# Patient Record
Sex: FEMALE | Race: WHITE | Employment: UNEMPLOYED | URBAN - METROPOLITAN AREA
[De-identification: names, ages, dates, MRNs, and addresses within clinical notes are randomized per-mention and may not be internally consistent; named-entity substitution may affect disease eponyms.]

---

## 2017-06-25 ENCOUNTER — HOSPITAL ENCOUNTER (EMERGENCY)
Age: 27
Discharge: HOME | End: 2017-06-25

## 2017-06-25 VITALS
RESPIRATION RATE: 18 BRPM | TEMPERATURE: 98.2 F | DIASTOLIC BLOOD PRESSURE: 88 MMHG | HEIGHT: 66 IN | BODY MASS INDEX: 24.11 KG/M2 | HEART RATE: 88 BPM | OXYGEN SATURATION: 98 % | SYSTOLIC BLOOD PRESSURE: 116 MMHG | WEIGHT: 150 LBS

## 2017-06-25 DIAGNOSIS — N93.8 DUB (DYSFUNCTIONAL UTERINE BLEEDING): Primary | ICD-10-CM

## 2017-06-25 LAB
AMORPHOUS: ABNORMAL
BACTERIA: ABNORMAL /HPF
BILIRUBIN URINE: NEGATIVE
BLOOD, URINE: ABNORMAL
CHP ED QC CHECK: YES
CLARITY: ABNORMAL
COLOR: YELLOW
GLUCOSE URINE: NEGATIVE MG/DL
KETONES, URINE: NEGATIVE MG/DL
LEUKOCYTE ESTERASE, URINE: ABNORMAL
NITRITE, URINE: POSITIVE
PH UA: 5.5 (ref 5–9)
PREGNANCY TEST URINE, POC: NORMAL
PROTEIN UA: 30 MG/DL
RBC UA: ABNORMAL /HPF (ref 0–2)
SPECIFIC GRAVITY UA: 1.01 (ref 1–1.03)
URINE REFLEX TO CULTURE: YES
UROBILINOGEN, URINE: 0.2 E.U./DL
WBC UA: ABNORMAL /HPF (ref 0–5)

## 2017-06-25 PROCEDURE — 81001 URINALYSIS AUTO W/SCOPE: CPT

## 2017-06-25 PROCEDURE — 87077 CULTURE AEROBIC IDENTIFY: CPT

## 2017-06-25 PROCEDURE — 99282 EMERGENCY DEPT VISIT SF MDM: CPT

## 2017-06-25 PROCEDURE — 87086 URINE CULTURE/COLONY COUNT: CPT

## 2017-06-25 PROCEDURE — 87186 SC STD MICRODIL/AGAR DIL: CPT

## 2017-06-25 ASSESSMENT — ENCOUNTER SYMPTOMS
COLOR CHANGE: 0
SHORTNESS OF BREATH: 0
ALLERGIC/IMMUNOLOGIC NEGATIVE: 1
APNEA: 0
ABDOMINAL PAIN: 0
TROUBLE SWALLOWING: 0
EYE PAIN: 0

## 2017-06-27 LAB
ORGANISM: ABNORMAL
URINE CULTURE, ROUTINE: ABNORMAL

## 2018-08-15 ENCOUNTER — OFFICE VISIT (OUTPATIENT)
Dept: OBGYN CLINIC | Age: 28
End: 2018-08-15
Payer: MEDICAID

## 2018-08-15 VITALS
HEIGHT: 66 IN | SYSTOLIC BLOOD PRESSURE: 98 MMHG | DIASTOLIC BLOOD PRESSURE: 54 MMHG | BODY MASS INDEX: 26.68 KG/M2 | WEIGHT: 166 LBS

## 2018-08-15 DIAGNOSIS — O23.43 UTI (URINARY TRACT INFECTION) DURING PREGNANCY, THIRD TRIMESTER: ICD-10-CM

## 2018-08-15 DIAGNOSIS — O09.33: ICD-10-CM

## 2018-08-15 DIAGNOSIS — O09.33: Primary | ICD-10-CM

## 2018-08-15 LAB
BACTERIA: ABNORMAL /HPF
BILIRUBIN URINE: NEGATIVE
BILIRUBIN, POC: ABNORMAL
BLOOD URINE, POC: ABNORMAL
BLOOD, URINE: NEGATIVE
CLARITY, POC: ABNORMAL
CLARITY: ABNORMAL
COLOR, POC: YELLOW
COLOR: YELLOW
EPITHELIAL CELLS, UA: ABNORMAL /HPF
GLUCOSE URINE, POC: ABNORMAL
GLUCOSE URINE: NEGATIVE MG/DL
KETONES, POC: ABNORMAL
KETONES, URINE: NEGATIVE MG/DL
LEUKOCYTE EST, POC: ABNORMAL
LEUKOCYTE ESTERASE, URINE: ABNORMAL
NITRITE, POC: ABNORMAL
NITRITE, URINE: POSITIVE
PH UA: 6.5 (ref 5–9)
PH, POC: 6
PROTEIN UA: NEGATIVE MG/DL
PROTEIN, POC: ABNORMAL
RBC UA: ABNORMAL /HPF (ref 0–2)
RENAL EPITHELIAL, UA: ABNORMAL /HPF
SPECIFIC GRAVITY UA: 1.01 (ref 1–1.03)
SPECIFIC GRAVITY, POC: 1.01
UROBILINOGEN, POC: ABNORMAL
UROBILINOGEN, URINE: 0.2 E.U./DL
WBC UA: ABNORMAL /HPF (ref 0–5)

## 2018-08-15 PROCEDURE — G8419 CALC BMI OUT NRM PARAM NOF/U: HCPCS | Performed by: OBSTETRICS & GYNECOLOGY

## 2018-08-15 PROCEDURE — 4004F PT TOBACCO SCREEN RCVD TLK: CPT | Performed by: OBSTETRICS & GYNECOLOGY

## 2018-08-15 PROCEDURE — 99214 OFFICE O/P EST MOD 30 MIN: CPT | Performed by: OBSTETRICS & GYNECOLOGY

## 2018-08-15 PROCEDURE — G8427 DOCREV CUR MEDS BY ELIG CLIN: HCPCS | Performed by: OBSTETRICS & GYNECOLOGY

## 2018-08-15 NOTE — LETTER
August 15, 2018  Re:  Aimee Day         Information on Dental Hygiene/Problems for Patients and Dentists. Dental Cleaning: It is safe for pregnant patients to have their teeth clean at any time during pregnancy. For patients with certain heart conditions warranting prophylaxis, follow the American Heart Association Guidelines. Antibiotics: Penicillin derivatives are safe to use at any time during pregnancy. Tetracycline, certain Erythromycins and other antibiotics are contraindicated. If the patient is PCN allergic Cleocin may be substituted. Anesthetics: Any of the local lidocaine family anesthetics may be used according to published dosage guidelines at any time during pregnancy. Inhalation anesthetics are to be avoided during the first 4 months of pregnancy. After this, inhalation agents may be used as well as general anesthesia. Braces: It is best to avoid placement of braces until after delivery. Surgery: If possible, delay dental surgery until after delivery. Cavities may be treated and filled. Do not delay necessary dental procedures. Pain medications: All NSAIDS agents are to be avoided during pregnancy. Tylenol and Codeine may be used. X-Rays:  Plain X-Rays are safe up to #50. One single apron over the abdomen is enough to protect the fetus. Do not use more than one apron on the patient abdomen.       Sincerely,         Dr. Paola Figueroa, Gloria Liu

## 2018-08-16 RX ORDER — SULFAMETHOXAZOLE AND TRIMETHOPRIM 800; 160 MG/1; MG/1
1 TABLET ORAL 2 TIMES DAILY
Qty: 14 TABLET | Refills: 0 | Status: SHIPPED | OUTPATIENT
Start: 2018-08-16 | End: 2018-08-23

## 2018-08-16 NOTE — PROGRESS NOTES
Initial OB visit      Favian Murdock is a 32y.o. year old female  @ 28 weeks by dates presents for prenatal care. [facility] was last visit and no care since that time. Patient had some initial labs and [address]. Pt denies any complications with previous pregnancy and no complaints currently. Patient has good fetal movement. No bleeding no contractions     POB: 1 ft     PGYN: negative     PMH: none    PSH: none    MEDS: prenatal vitamins ordered    Drug Allergies: NKDA    SOCHX: negative    FH: Mother or Father , DM No , HTN No, Other No    BP (!) 98/54 (Site: Right Arm, Position: Sitting, Cuff Size: Large Adult)   Ht 5' 6\" (1.676 m)   Wt 166 lb (75.3 kg)   LMP 2018 (Approximate)   BMI 26.79 kg/m²   History reviewed. No pertinent past medical history. Past Surgical History:   Procedure Laterality Date    APPENDECTOMY           Review of Systems  Constitutional: negative for anorexia, chills, fatigue and weight loss  Genitourinary:negative for genital lesions and vaginal discharge, dysuria and frequency, see above  Integument/breast: negative for dryness, pruritus and rash  Behavioral/Psych: negative for abusive relationship, anxiety and depression  Endocrine: negative     All other systems reviewed and are negative. Physical Exam:  Skin: Warm, dry, no lesions or rashes  Extremities: Without clubbing, cyanosis and edema. Palms and nails are normal. Ambulates without difficulty  Neurological: No gross sensory or motor deficits.   Abdomen: Soft, non-tender without masses or organomegaly  bowel sounds normoactive  External Genitalia: Normal anatomy, no lesions or masses  Pubic Hair Distribution: Normal pattern and distribution  Pelvic Floor: Normal pelvic support, no significant cystocele or rectocele  Perineum: No fissures, lesions or leukoplakia  Urethra: Normal  Vagina: Moist, pink, supple, no lesions, no abnormal discharge  Cervix: Firm, nontender, no lesions  Uterus: firm, mobile, nontender, no masses or irregularities  Adnexa: Normal; No masses or tenderness bilaterally      Assessment:   1. Suprvsn of preg w insufficient antenat care, third trimester    2. UTI (urinary tract infection) during pregnancy, third trimester        Plan:   Orders Placed This Encounter   Procedures    C.trachomatis N.gonorrhoeae DNA, Urine     Standing Status:   Future     Standing Expiration Date:   8/15/2019    Glucose tolerance, 1 hour     Standing Status:   Future     Standing Expiration Date:   8/15/2019    Urinalysis with Microscopic     Standing Status:   Future     Standing Expiration Date:   8/15/2019     Order Specific Question:   SPECIFY(EX-CATH,MIDSTREAM,CYSTO,ETC)? Answer:   mmidstream    CBC Auto Differential     Standing Status:   Future     Standing Expiration Date:   8/15/2019    Herpes simplex virus (HSV) I/II antibodies IgG & IgM w/ reflex     Standing Status:   Future     Standing Expiration Date:   8/15/2019    Urine Drug Screen 9 Panel     Standing Status:   Future     Number of Occurrences:   1     Standing Expiration Date:   8/15/2019    POCT Urinalysis No Micro (Auto)        Orders Placed This Encounter   Medications    sulfamethoxazole-trimethoprim (BACTRIM DS) 800-160 MG per tablet     Sig: Take 1 tablet by mouth 2 times daily for 7 days     Dispense:  14 tablet     Refill:  0     Plan:   DO Donnie Mann  8/15/2018  Patient's last menstrual period was 2018 (approximate). INITIAL OBSTETRICAL VISIT EVALUATION:  The patient was seen full history and physical was completed/reviewed. Cytology was collected for patients over 24years of age. Cultures were collected. The patient was counseled on office policies and she was counseled on termination of pregnancy in the state of [place].      The patient was counseled on Toxoplasmosis, HIV, Tobacco Abuse, Group Beta Strep Infections, Cystic Fibrosis,  Labor

## 2018-08-17 ENCOUNTER — HOSPITAL ENCOUNTER (EMERGENCY)
Age: 28
Discharge: HOME | End: 2018-08-17
Payer: MEDICAID

## 2018-08-17 ENCOUNTER — TELEPHONE (OUTPATIENT)
Dept: ENDOCRINOLOGY | Age: 28
End: 2018-08-17

## 2018-08-17 VITALS
BODY MASS INDEX: 26.68 KG/M2 | RESPIRATION RATE: 12 BRPM | HEART RATE: 99 BPM | SYSTOLIC BLOOD PRESSURE: 117 MMHG | OXYGEN SATURATION: 99 % | WEIGHT: 166 LBS | TEMPERATURE: 97.9 F | HEIGHT: 66 IN | DIASTOLIC BLOOD PRESSURE: 76 MMHG

## 2018-08-17 DIAGNOSIS — J01.40 ACUTE NON-RECURRENT PANSINUSITIS: Primary | ICD-10-CM

## 2018-08-17 PROCEDURE — 99282 EMERGENCY DEPT VISIT SF MDM: CPT

## 2018-08-17 RX ORDER — CETIRIZINE HYDROCHLORIDE 10 MG/1
10 TABLET ORAL DAILY
Qty: 30 TABLET | Refills: 0 | Status: ON HOLD | OUTPATIENT
Start: 2018-08-17 | End: 2020-12-24

## 2018-08-17 RX ORDER — FLUTICASONE PROPIONATE 50 MCG
2 SPRAY, SUSPENSION (ML) NASAL DAILY
Qty: 1 BOTTLE | Refills: 0 | Status: ON HOLD | OUTPATIENT
Start: 2018-08-17 | End: 2020-12-24

## 2018-08-17 RX ORDER — ASCORBIC ACID, CHOLECALCIFEROL, .ALPHA.-TOCOPHEROL ACETATE, DL-, PYRIDOXINE HYDROCHLORIDE, FOLIC ACID, CYANOCOBALAMIN, BIOTIN, CALCIUM CARBONATE, FERROUS ASPARTO GLYCINATE, IRON, POTASSIUM IODIDE, MAGNESIUM OXIDE, DOCONEXENT AND LOWBUSH BLUEBERRY 60; 1000; 10; 26; 400; 13; 280; 80; 9; 9; 150; 25; 350; 25; 600 MG/1; [IU]/1; [IU]/1; MG/1; UG/1; UG/1; UG/1; MG/1; MG/1; MG/1; UG/1; MG/1; MG/1; MG/1; UG/1
1 CAPSULE, GELATIN COATED ORAL DAILY
Qty: 30 CAPSULE | Refills: 5 | Status: ON HOLD | OUTPATIENT
Start: 2018-08-17 | End: 2020-12-23

## 2018-08-17 ASSESSMENT — ENCOUNTER SYMPTOMS
COUGH: 0
ABDOMINAL PAIN: 0
WHEEZING: 0
TROUBLE SWALLOWING: 0
DIARRHEA: 0
EYE REDNESS: 0
VOMITING: 0
CHEST TIGHTNESS: 0
NAUSEA: 0
EYE ITCHING: 0
SINUS PRESSURE: 1
SHORTNESS OF BREATH: 0
CONSTIPATION: 0
SINUS PAIN: 1
EYE PAIN: 0
EYE DISCHARGE: 0
COLOR CHANGE: 0
SORE THROAT: 0
ABDOMINAL DISTENTION: 0
RHINORRHEA: 0

## 2018-08-17 ASSESSMENT — PAIN SCALES - GENERAL: PAINLEVEL_OUTOF10: 7

## 2018-08-17 ASSESSMENT — PAIN DESCRIPTION - PAIN TYPE: TYPE: ACUTE PAIN

## 2018-08-17 NOTE — ED PROVIDER NOTES
[facility] ED  eMERGENCY dEPARTMENT eNCOUnter      Pt Name: Addi Mortensen  MRN: [id number]  [place] 1990  Date of evaluation: 8/17/2018  Provider: Tiffany Christensen       Chief Complaint   Patient presents with    Facial Pain     c/o pain in head, nose, jaw with blisters in the top of her mouth. started Sunday         HISTORY OF PRESENT ILLNESS   (Location/Symptom, Timing/Onset, Context/Setting, Quality, Duration, Modifying Factors, Severity)  Note limiting factors. Addi Mortensen is a 32 y.o. female who presents to the emergency department For complaint of sinus pressure pain across the nose and under the eyes and of the jaw on Sunday. Patient states pain is 7 out of 10 aching throbbing sensation made worse with certain positions the head or bending forward. She states that over-the-counter Tylenol has not been providing relief. Patient's confirms that she is currently pregnant and was at her OB appointment for urinary tract infection and given antibiotics which she has not yet started. She denies any cough congestion shortness of breath. Patient denies any ear pain sore throat or nasal discharge. Nursing Notes were reviewed. REVIEW OF SYSTEMS    (2-9 systems for level 4, 10 or more for level 5)     Review of Systems   Constitutional: Negative for activity change, appetite change, chills, diaphoresis, fatigue and fever. HENT: Positive for sinus pain and sinus pressure. Negative for congestion, ear pain, postnasal drip, rhinorrhea, sneezing, sore throat and trouble swallowing. Eyes: Negative for pain, discharge, redness, itching and visual disturbance. Respiratory: Negative for cough, chest tightness, shortness of breath and wheezing. Cardiovascular: Negative for chest pain and palpitations. Gastrointestinal: Negative for abdominal distention, abdominal pain, constipation, diarrhea, nausea and vomiting.    Genitourinary: Negative for difficulty urinating and flank pain. Musculoskeletal: Negative for myalgias. Skin: Negative for color change and rash. Neurological: Positive for headaches. Negative for dizziness, weakness and light-headedness. Except as noted above the remainder of the review of systems was reviewed and negative. PAST MEDICAL HISTORY   History reviewed. No pertinent past medical history. Past Surgical History:   Procedure Laterality Date    APPENDECTOMY       Social History     Social History    Marital status: Single     Spouse name: N/A    Number of children: N/A    Years of education: N/A     Social History Main Topics    Smoking status: Current Every Day Smoker     Packs/day: 1.00     Years: 15.00     Types: Cigarettes    Smokeless tobacco: Never Used    Alcohol use No      Comment: daily    Drug use: No      Comment: weed daily    Sexual activity: Yes     Partners: Male     Other Topics Concern    None     Social History Narrative    None       SCREENINGS             PHYSICAL EXAM    (up to 7 for level 4, 8 or more for level 5)     ED Triage Vitals [08/17/18 1022]   BP Temp Temp Source Pulse Resp SpO2 Height Weight   117/76 97.9 °F (36.6 °C) Oral 99 12 99 % 5' 6\" (1.676 m) 166 lb (75.3 kg)       Physical Exam   Constitutional: She is oriented to person, place, and time. She appears well-developed and well-nourished. No distress. HENT:   Head: Normocephalic and atraumatic. Right Ear: Hearing, external ear and ear canal normal. Tympanic membrane is bulging. Left Ear: Hearing, external ear and ear canal normal. Tympanic membrane is bulging. Nose: Mucosal edema and sinus tenderness present. No rhinorrhea. Right sinus exhibits maxillary sinus tenderness and frontal sinus tenderness. Left sinus exhibits maxillary sinus tenderness and frontal sinus tenderness. Mouth/Throat: Uvula is midline and mucous membranes are normal. Posterior oropharyngeal erythema present.  No oropharyngeal exudate,

## 2018-08-18 LAB
AMPHETAMINE SCREEN, URINE: NEGATIVE NG/ML
BARBITURATE SCREEN URINE: NEGATIVE NG/ML
BENZODIAZEPINE SCREEN, URINE: NEGATIVE NG/ML
CANNABINOID SCREEN URINE: POSITIVE NG/ML
COCAINE METABOLITE SCREEN URINE: POSITIVE NG/ML
CREATININE URINE: 58.2 MG/DL (ref 20–400)
Lab: NORMAL
MDMA URINE: NEGATIVE NG/ML
METHADONE SCREEN, URINE: NEGATIVE NG/ML
OPIATE SCREEN URINE: NEGATIVE NG/ML
OXYCODONE SCREEN URINE: NEGATIVE NG/ML
PHENCYCLIDINE SCREEN URINE: NEGATIVE NG/ML
PROPOXYPHENE SCREEN: NEGATIVE NG/ML

## 2018-08-20 LAB
C. TRACHOMATIS DNA ,URINE: NEGATIVE
N. GONORRHOEAE DNA, URINE: NEGATIVE

## 2018-08-27 DIAGNOSIS — O09.33: ICD-10-CM

## 2018-08-27 LAB
BACTERIA: ABNORMAL /HPF
BASOPHILS ABSOLUTE: 0 K/UL (ref 0–0.2)
BASOPHILS RELATIVE PERCENT: 0.3 %
BILIRUBIN URINE: ABNORMAL
BLOOD, URINE: NEGATIVE
CLARITY: ABNORMAL
COLOR: ABNORMAL
EOSINOPHILS ABSOLUTE: 0.1 K/UL (ref 0–0.7)
EOSINOPHILS RELATIVE PERCENT: 0.9 %
EPITHELIAL CELLS, UA: ABNORMAL /HPF
GLUCOSE URINE: NEGATIVE MG/DL
GLUCOSE, 1HR PP: 112 MG/DL (ref 60–140)
HCT VFR BLD CALC: 35.6 % (ref 37–47)
HEMOGLOBIN: 12.1 G/DL (ref 12–16)
KETONES, URINE: NEGATIVE MG/DL
LEUKOCYTE ESTERASE, URINE: ABNORMAL
LYMPHOCYTES ABSOLUTE: 1.4 K/UL (ref 1–4.8)
LYMPHOCYTES RELATIVE PERCENT: 11.9 %
MCH RBC QN AUTO: 33.1 PG (ref 27–31.3)
MCHC RBC AUTO-ENTMCNC: 34.1 % (ref 33–37)
MCV RBC AUTO: 97.1 FL (ref 82–100)
MONOCYTES ABSOLUTE: 0.4 K/UL (ref 0.2–0.8)
MONOCYTES RELATIVE PERCENT: 3.9 %
NEUTROPHILS ABSOLUTE: 9.4 K/UL (ref 1.4–6.5)
NEUTROPHILS RELATIVE PERCENT: 83 %
NITRITE, URINE: NEGATIVE
PDW BLD-RTO: 12.9 % (ref 11.5–14.5)
PH UA: 6 (ref 5–9)
PLATELET # BLD: 252 K/UL (ref 130–400)
PROTEIN UA: ABNORMAL MG/DL
RBC # BLD: 3.67 M/UL (ref 4.2–5.4)
RBC UA: ABNORMAL /HPF (ref 0–2)
RENAL EPITHELIAL, UA: ABNORMAL /HPF
SPECIFIC GRAVITY UA: 1.03 (ref 1–1.03)
UROBILINOGEN, URINE: 0.2 E.U./DL
WBC # BLD: 11.4 K/UL (ref 4.8–10.8)
WBC UA: ABNORMAL /HPF (ref 0–5)

## 2018-08-30 LAB
HERPES TYPE 1/2 IGM COMBINED: 0.65 IV
HERPES TYPE I/II IGG COMBINED: 13.3 IV
HSV 1 GLYCOPROTEIN G AB IGG: 23.9 IV
HSV 2 GLYCOPROTEIN G AB IGG: 0.05 IV

## 2018-09-05 LAB
C. TRACHOMATIS DNA ,URINE: NEGATIVE
N. GONORRHOEAE DNA, URINE: NEGATIVE

## 2018-09-07 ENCOUNTER — ROUTINE PRENATAL (OUTPATIENT)
Dept: OBGYN CLINIC | Age: 28
End: 2018-09-07
Payer: MEDICAID

## 2018-09-07 VITALS — DIASTOLIC BLOOD PRESSURE: 62 MMHG | SYSTOLIC BLOOD PRESSURE: 110 MMHG | HEART RATE: 86 BPM

## 2018-09-07 DIAGNOSIS — Z3A.32 32 WEEKS GESTATION OF PREGNANCY: ICD-10-CM

## 2018-09-07 DIAGNOSIS — Z34.83 ENCOUNTER FOR SUPERVISION OF OTHER NORMAL PREGNANCY IN THIRD TRIMESTER: Primary | ICD-10-CM

## 2018-09-07 DIAGNOSIS — F17.210 CIGARETTE NICOTINE DEPENDENCE WITHOUT COMPLICATION: ICD-10-CM

## 2018-09-07 LAB
BILIRUBIN, POC: ABNORMAL
BLOOD URINE, POC: ABNORMAL
CLARITY, POC: CLEAR
COLOR, POC: YELLOW
GLUCOSE URINE, POC: ABNORMAL
KETONES, POC: ABNORMAL
LEUKOCYTE EST, POC: ABNORMAL
NITRITE, POC: ABNORMAL
PH, POC: 8
PROTEIN, POC: ABNORMAL
SPECIFIC GRAVITY, POC: 1.01
UROBILINOGEN, POC: ABNORMAL

## 2018-09-07 PROCEDURE — G8419 CALC BMI OUT NRM PARAM NOF/U: HCPCS | Performed by: OBSTETRICS & GYNECOLOGY

## 2018-09-07 PROCEDURE — 99213 OFFICE O/P EST LOW 20 MIN: CPT | Performed by: OBSTETRICS & GYNECOLOGY

## 2018-09-07 PROCEDURE — G8427 DOCREV CUR MEDS BY ELIG CLIN: HCPCS | Performed by: OBSTETRICS & GYNECOLOGY

## 2018-09-07 PROCEDURE — 4004F PT TOBACCO SCREEN RCVD TLK: CPT | Performed by: OBSTETRICS & GYNECOLOGY

## 2018-09-17 ENCOUNTER — HOSPITAL ENCOUNTER (OUTPATIENT)
Dept: ULTRASOUND IMAGING | Age: 28
Discharge: HOME | End: 2018-09-19
Payer: MEDICAID

## 2018-09-17 DIAGNOSIS — Z34.83 ENCOUNTER FOR SUPERVISION OF OTHER NORMAL PREGNANCY IN THIRD TRIMESTER: ICD-10-CM

## 2018-09-17 DIAGNOSIS — Z3A.32 32 WEEKS GESTATION OF PREGNANCY: ICD-10-CM

## 2018-09-17 DIAGNOSIS — F17.210 CIGARETTE NICOTINE DEPENDENCE WITHOUT COMPLICATION: ICD-10-CM

## 2018-09-17 PROCEDURE — 76805 OB US >/= 14 WKS SNGL FETUS: CPT

## 2018-09-17 PROCEDURE — 76817 TRANSVAGINAL US OBSTETRIC: CPT

## 2018-09-21 ENCOUNTER — ROUTINE PRENATAL (OUTPATIENT)
Dept: OBGYN CLINIC | Age: 28
End: 2018-09-21
Payer: MEDICAID

## 2018-09-21 VITALS
BODY MASS INDEX: 26.95 KG/M2 | SYSTOLIC BLOOD PRESSURE: 104 MMHG | HEART RATE: 92 BPM | DIASTOLIC BLOOD PRESSURE: 70 MMHG | WEIGHT: 167 LBS

## 2018-09-21 DIAGNOSIS — Z3A.38 38 WEEKS GESTATION OF PREGNANCY: ICD-10-CM

## 2018-09-21 DIAGNOSIS — Z34.83 ENCOUNTER FOR SUPERVISION OF OTHER NORMAL PREGNANCY IN THIRD TRIMESTER: Primary | ICD-10-CM

## 2018-09-21 PROCEDURE — G8419 CALC BMI OUT NRM PARAM NOF/U: HCPCS | Performed by: OBSTETRICS & GYNECOLOGY

## 2018-09-21 PROCEDURE — 99213 OFFICE O/P EST LOW 20 MIN: CPT | Performed by: OBSTETRICS & GYNECOLOGY

## 2018-09-21 PROCEDURE — 4004F PT TOBACCO SCREEN RCVD TLK: CPT | Performed by: OBSTETRICS & GYNECOLOGY

## 2018-09-21 PROCEDURE — G8427 DOCREV CUR MEDS BY ELIG CLIN: HCPCS | Performed by: OBSTETRICS & GYNECOLOGY

## 2018-09-21 RX ORDER — PNV,CALCIUM 72/IRON/FOLIC ACID 27 MG-1 MG
TABLET ORAL
Refills: 5 | Status: ON HOLD | COMMUNITY
Start: 2018-09-15 | End: 2020-12-23

## 2018-10-09 ENCOUNTER — ROUTINE PRENATAL (OUTPATIENT)
Dept: OBGYN CLINIC | Age: 28
End: 2018-10-09
Payer: MEDICAID

## 2018-10-09 VITALS
BODY MASS INDEX: 27.44 KG/M2 | SYSTOLIC BLOOD PRESSURE: 100 MMHG | HEART RATE: 96 BPM | DIASTOLIC BLOOD PRESSURE: 64 MMHG | WEIGHT: 170 LBS

## 2018-10-09 DIAGNOSIS — Z34.83 ENCOUNTER FOR SUPERVISION OF OTHER NORMAL PREGNANCY IN THIRD TRIMESTER: ICD-10-CM

## 2018-10-09 DIAGNOSIS — Z3A.36 36 WEEKS GESTATION OF PREGNANCY: Primary | ICD-10-CM

## 2018-10-09 DIAGNOSIS — Z3A.36 36 WEEKS GESTATION OF PREGNANCY: ICD-10-CM

## 2018-10-09 PROCEDURE — 4004F PT TOBACCO SCREEN RCVD TLK: CPT | Performed by: OBSTETRICS & GYNECOLOGY

## 2018-10-09 PROCEDURE — G8419 CALC BMI OUT NRM PARAM NOF/U: HCPCS | Performed by: OBSTETRICS & GYNECOLOGY

## 2018-10-09 PROCEDURE — G8484 FLU IMMUNIZE NO ADMIN: HCPCS | Performed by: OBSTETRICS & GYNECOLOGY

## 2018-10-09 PROCEDURE — 99213 OFFICE O/P EST LOW 20 MIN: CPT | Performed by: OBSTETRICS & GYNECOLOGY

## 2018-10-09 PROCEDURE — G8427 DOCREV CUR MEDS BY ELIG CLIN: HCPCS | Performed by: OBSTETRICS & GYNECOLOGY

## 2018-10-09 NOTE — PROGRESS NOTES
Patient's last menstrual period was 01/26/2018 (approximate). Please reference prenatal and OB flow chart for further information  PT here today for routine prenatal care  Pt endorses fetal movement and denies loss of fluid, contractions or vaginal bleeding  Pt without complaints  ROS:  Pt denies headache, dysuria, nausea/vomiting  PE:  /64   Pulse 96   Wt 170 lb (77.1 kg)   LMP 01/26/2018 (Approximate)   BMI 27.44 kg/m²   Gen - Alert and oriented x 3  HEENT- NC/AT, CVS - RRR, Lungs - CTAB  Abd - FH Appropriate fetal growth  LE no edema  Reassuring fetal status at this time    Upon completion of the visit all questions were answered and the patients follow-up and testing schedule were reviewed.

## 2018-10-11 LAB
GROUP B STREP CULTURE: ABNORMAL
GROUP B STREP CULTURE: ABNORMAL
ORGANISM: ABNORMAL

## 2018-10-17 ENCOUNTER — ROUTINE PRENATAL (OUTPATIENT)
Dept: OBGYN CLINIC | Age: 28
End: 2018-10-17
Payer: MEDICAID

## 2018-10-17 VITALS
BODY MASS INDEX: 27.92 KG/M2 | SYSTOLIC BLOOD PRESSURE: 100 MMHG | WEIGHT: 173 LBS | DIASTOLIC BLOOD PRESSURE: 62 MMHG | HEART RATE: 88 BPM

## 2018-10-17 DIAGNOSIS — Z3A.37 37 WEEKS GESTATION OF PREGNANCY: Primary | ICD-10-CM

## 2018-10-17 DIAGNOSIS — O99.333 HIGH RISK PREGNANCY DUE TO SMOKING IN THIRD TRIMESTER: ICD-10-CM

## 2018-10-17 PROCEDURE — 4004F PT TOBACCO SCREEN RCVD TLK: CPT | Performed by: OBSTETRICS & GYNECOLOGY

## 2018-10-17 PROCEDURE — 99213 OFFICE O/P EST LOW 20 MIN: CPT | Performed by: OBSTETRICS & GYNECOLOGY

## 2018-10-17 PROCEDURE — G8484 FLU IMMUNIZE NO ADMIN: HCPCS | Performed by: OBSTETRICS & GYNECOLOGY

## 2018-10-17 PROCEDURE — G8419 CALC BMI OUT NRM PARAM NOF/U: HCPCS | Performed by: OBSTETRICS & GYNECOLOGY

## 2018-10-17 PROCEDURE — G8427 DOCREV CUR MEDS BY ELIG CLIN: HCPCS | Performed by: OBSTETRICS & GYNECOLOGY

## 2018-10-17 NOTE — PROGRESS NOTES
Patient's last menstrual period was 01/26/2018 (approximate). Please reference prenatal and OB flow chart for further information  PT here today for routine prenatal care  Pt endorses fetal movement and denies loss of fluid, contractions or vaginal bleeding  Pt without complaints  ROS:  Pt denies headache, dysuria, nausea/vomiting  PE:  /62   Pulse 88   Wt 173 lb (78.5 kg)   LMP 01/26/2018 (Approximate)   BMI 27.92 kg/m²   Gen - Alert and oriented x 3  HEENT- NC/AT, CVS - RRR, Lungs - CTAB  Abd - FH Appropriate fetal growth  LE no edema  Reassuring fetal status at this time    Upon completion of the visit all questions were answered and the patients follow-up and testing schedule were reviewed.

## 2018-10-24 ENCOUNTER — ROUTINE PRENATAL (OUTPATIENT)
Dept: OBGYN CLINIC | Age: 28
End: 2018-10-24
Payer: MEDICAID

## 2018-10-24 VITALS
DIASTOLIC BLOOD PRESSURE: 64 MMHG | SYSTOLIC BLOOD PRESSURE: 104 MMHG | WEIGHT: 171 LBS | HEART RATE: 90 BPM | BODY MASS INDEX: 27.6 KG/M2

## 2018-10-24 DIAGNOSIS — Z3A.38 38 WEEKS GESTATION OF PREGNANCY: ICD-10-CM

## 2018-10-24 DIAGNOSIS — Z34.83 ENCOUNTER FOR SUPERVISION OF OTHER NORMAL PREGNANCY IN THIRD TRIMESTER: Primary | ICD-10-CM

## 2018-10-24 DIAGNOSIS — F17.210 CIGARETTE NICOTINE DEPENDENCE WITHOUT COMPLICATION: ICD-10-CM

## 2018-10-24 PROCEDURE — 4004F PT TOBACCO SCREEN RCVD TLK: CPT | Performed by: OBSTETRICS & GYNECOLOGY

## 2018-10-24 PROCEDURE — G8427 DOCREV CUR MEDS BY ELIG CLIN: HCPCS | Performed by: OBSTETRICS & GYNECOLOGY

## 2018-10-24 PROCEDURE — G8419 CALC BMI OUT NRM PARAM NOF/U: HCPCS | Performed by: OBSTETRICS & GYNECOLOGY

## 2018-10-24 PROCEDURE — G8484 FLU IMMUNIZE NO ADMIN: HCPCS | Performed by: OBSTETRICS & GYNECOLOGY

## 2018-10-24 PROCEDURE — 99213 OFFICE O/P EST LOW 20 MIN: CPT | Performed by: OBSTETRICS & GYNECOLOGY

## 2018-10-31 ENCOUNTER — ROUTINE PRENATAL (OUTPATIENT)
Dept: OBGYN CLINIC | Age: 28
End: 2018-10-31
Payer: MEDICAID

## 2018-10-31 VITALS
WEIGHT: 171 LBS | DIASTOLIC BLOOD PRESSURE: 62 MMHG | HEART RATE: 98 BPM | BODY MASS INDEX: 27.6 KG/M2 | SYSTOLIC BLOOD PRESSURE: 108 MMHG

## 2018-10-31 DIAGNOSIS — Z3A.39 39 WEEKS GESTATION OF PREGNANCY: ICD-10-CM

## 2018-10-31 DIAGNOSIS — Z34.83 ENCOUNTER FOR SUPERVISION OF OTHER NORMAL PREGNANCY IN THIRD TRIMESTER: Primary | ICD-10-CM

## 2018-10-31 PROCEDURE — 99213 OFFICE O/P EST LOW 20 MIN: CPT | Performed by: OBSTETRICS & GYNECOLOGY

## 2018-10-31 PROCEDURE — G8427 DOCREV CUR MEDS BY ELIG CLIN: HCPCS | Performed by: OBSTETRICS & GYNECOLOGY

## 2018-10-31 PROCEDURE — G8419 CALC BMI OUT NRM PARAM NOF/U: HCPCS | Performed by: OBSTETRICS & GYNECOLOGY

## 2018-10-31 PROCEDURE — G8484 FLU IMMUNIZE NO ADMIN: HCPCS | Performed by: OBSTETRICS & GYNECOLOGY

## 2018-10-31 PROCEDURE — 4004F PT TOBACCO SCREEN RCVD TLK: CPT | Performed by: OBSTETRICS & GYNECOLOGY

## 2018-11-01 ENCOUNTER — HOSPITAL ENCOUNTER (INPATIENT)
Age: 28
LOS: 1 days | Discharge: HOME | DRG: 560 | End: 2018-11-02
Attending: OBSTETRICS & GYNECOLOGY | Admitting: OBSTETRICS & GYNECOLOGY
Payer: MEDICAID

## 2018-11-01 ENCOUNTER — ANESTHESIA EVENT (OUTPATIENT)
Dept: LABOR AND DELIVERY | Age: 28
DRG: 560 | End: 2018-11-01
Payer: MEDICAID

## 2018-11-01 ENCOUNTER — ANESTHESIA (OUTPATIENT)
Dept: LABOR AND DELIVERY | Age: 28
DRG: 560 | End: 2018-11-01
Payer: MEDICAID

## 2018-11-01 PROBLEM — Z78.9 ADMITTED TO LABOR AND DELIVERY: Status: ACTIVE | Noted: 2018-11-01

## 2018-11-01 LAB
ABO/RH: NORMAL
ALBUMIN SERPL-MCNC: 3.7 G/DL (ref 3.9–4.9)
ALP BLD-CCNC: 215 U/L (ref 40–130)
ALT SERPL-CCNC: 23 U/L (ref 0–33)
AMORPHOUS: ABNORMAL
AMPHETAMINE SCREEN, URINE: ABNORMAL
ANION GAP SERPL CALCULATED.3IONS-SCNC: 18 MEQ/L (ref 7–13)
ANTIBODY SCREEN: NORMAL
AST SERPL-CCNC: 23 U/L (ref 0–35)
BACTERIA: ABNORMAL /HPF
BARBITURATE SCREEN URINE: ABNORMAL
BASOPHILS ABSOLUTE: 0.1 K/UL (ref 0–0.2)
BASOPHILS RELATIVE PERCENT: 0.3 %
BENZODIAZEPINE SCREEN, URINE: ABNORMAL
BILIRUB SERPL-MCNC: 0.4 MG/DL (ref 0–1.2)
BILIRUBIN URINE: NEGATIVE
BLOOD, URINE: ABNORMAL
BUN BLDV-MCNC: 7 MG/DL (ref 6–20)
CALCIUM SERPL-MCNC: 8.8 MG/DL (ref 8.6–10.2)
CANNABINOID SCREEN URINE: ABNORMAL
CHLORIDE BLD-SCNC: 91 MEQ/L (ref 98–107)
CLARITY: CLEAR
CO2: 18 MEQ/L (ref 22–29)
COCAINE METABOLITE SCREEN URINE: POSITIVE
COLOR: YELLOW
CREAT SERPL-MCNC: 0.51 MG/DL (ref 0.5–0.9)
EOSINOPHILS ABSOLUTE: 0 K/UL (ref 0–0.7)
EOSINOPHILS RELATIVE PERCENT: 0.1 %
GFR AFRICAN AMERICAN: >60
GFR NON-AFRICAN AMERICAN: >60
GLOBULIN: 3.1 G/DL (ref 2.3–3.5)
GLUCOSE BLD-MCNC: 89 MG/DL (ref 74–109)
GLUCOSE URINE: NEGATIVE MG/DL
HCT VFR BLD CALC: 39.5 % (ref 37–47)
HEMOGLOBIN: 13.4 G/DL (ref 12–16)
HEPATITIS B SURFACE ANTIGEN INTERPRETATION: NORMAL
KETONES, URINE: 15 MG/DL
LEUKOCYTE ESTERASE, URINE: ABNORMAL
LYMPHOCYTES ABSOLUTE: 1.1 K/UL (ref 1–4.8)
LYMPHOCYTES RELATIVE PERCENT: 5.2 %
Lab: ABNORMAL
MCH RBC QN AUTO: 32.4 PG (ref 27–31.3)
MCHC RBC AUTO-ENTMCNC: 34.1 % (ref 33–37)
MCV RBC AUTO: 95 FL (ref 82–100)
MONOCYTES ABSOLUTE: 0.8 K/UL (ref 0.2–0.8)
MONOCYTES RELATIVE PERCENT: 3.8 %
NEUTROPHILS ABSOLUTE: 18.9 K/UL (ref 1.4–6.5)
NEUTROPHILS RELATIVE PERCENT: 90.6 %
NITRITE, URINE: NEGATIVE
OPIATE SCREEN URINE: ABNORMAL
PDW BLD-RTO: 13.3 % (ref 11.5–14.5)
PH UA: 7.5 (ref 5–9)
PHENCYCLIDINE SCREEN URINE: ABNORMAL
PLATELET # BLD: 236 K/UL (ref 130–400)
POTASSIUM SERPL-SCNC: 3.5 MEQ/L (ref 3.5–5.1)
PROTEIN UA: NEGATIVE MG/DL
RBC # BLD: 4.15 M/UL (ref 4.2–5.4)
RBC UA: ABNORMAL /HPF (ref 0–2)
SLIDE REVIEW: ABNORMAL
SODIUM BLD-SCNC: 127 MEQ/L (ref 132–144)
SPECIFIC GRAVITY UA: 1.01 (ref 1–1.03)
TOTAL PROTEIN: 6.8 G/DL (ref 6.4–8.1)
UROBILINOGEN, URINE: 0.2 E.U./DL
WBC # BLD: 20.9 K/UL (ref 4.8–10.8)
WBC UA: ABNORMAL /HPF (ref 0–5)

## 2018-11-01 PROCEDURE — 2580000003 HC RX 258: Performed by: OBSTETRICS & GYNECOLOGY

## 2018-11-01 PROCEDURE — 85025 COMPLETE CBC W/AUTO DIFF WBC: CPT

## 2018-11-01 PROCEDURE — 2500000003 HC RX 250 WO HCPCS: Performed by: NURSE ANESTHETIST, CERTIFIED REGISTERED

## 2018-11-01 PROCEDURE — 81001 URINALYSIS AUTO W/SCOPE: CPT

## 2018-11-01 PROCEDURE — 6360000002 HC RX W HCPCS: Performed by: OBSTETRICS & GYNECOLOGY

## 2018-11-01 PROCEDURE — 86592 SYPHILIS TEST NON-TREP QUAL: CPT

## 2018-11-01 PROCEDURE — 36415 COLL VENOUS BLD VENIPUNCTURE: CPT

## 2018-11-01 PROCEDURE — 87340 HEPATITIS B SURFACE AG IA: CPT

## 2018-11-01 PROCEDURE — 59409 OBSTETRICAL CARE: CPT | Performed by: OBSTETRICS & GYNECOLOGY

## 2018-11-01 PROCEDURE — 80053 COMPREHEN METABOLIC PANEL: CPT

## 2018-11-01 PROCEDURE — 6370000000 HC RX 637 (ALT 250 FOR IP): Performed by: OBSTETRICS & GYNECOLOGY

## 2018-11-01 PROCEDURE — 88307 TISSUE EXAM BY PATHOLOGIST: CPT

## 2018-11-01 PROCEDURE — 1220000000 HC SEMI PRIVATE OB R&B

## 2018-11-01 PROCEDURE — 80307 DRUG TEST PRSMV CHEM ANLYZR: CPT

## 2018-11-01 PROCEDURE — 86850 RBC ANTIBODY SCREEN: CPT

## 2018-11-01 PROCEDURE — 86900 BLOOD TYPING SEROLOGIC ABO: CPT

## 2018-11-01 PROCEDURE — 3700000025 ANESTHESIA EPIDURAL BLOCK: Performed by: NURSE ANESTHETIST, CERTIFIED REGISTERED

## 2018-11-01 PROCEDURE — 86901 BLOOD TYPING SEROLOGIC RH(D): CPT

## 2018-11-01 RX ORDER — SODIUM CHLORIDE 0.9 % (FLUSH) 0.9 %
10 SYRINGE (ML) INJECTION EVERY 12 HOURS SCHEDULED
Status: DISCONTINUED | OUTPATIENT
Start: 2018-11-01 | End: 2018-11-02 | Stop reason: HOSPADM

## 2018-11-01 RX ORDER — ACETAMINOPHEN 325 MG/1
650 TABLET ORAL EVERY 4 HOURS PRN
Status: DISCONTINUED | OUTPATIENT
Start: 2018-11-01 | End: 2018-11-01

## 2018-11-01 RX ORDER — CETIRIZINE HYDROCHLORIDE 10 MG/1
10 TABLET ORAL DAILY
Status: DISCONTINUED | OUTPATIENT
Start: 2018-11-01 | End: 2018-11-02 | Stop reason: HOSPADM

## 2018-11-01 RX ORDER — NALBUPHINE HCL 10 MG/ML
10 AMPUL (ML) INJECTION
Status: DISCONTINUED | OUTPATIENT
Start: 2018-11-01 | End: 2018-11-01

## 2018-11-01 RX ORDER — FAMOTIDINE 20 MG/1
20 TABLET, FILM COATED ORAL 2 TIMES DAILY
Status: DISCONTINUED | OUTPATIENT
Start: 2018-11-01 | End: 2018-11-02 | Stop reason: HOSPADM

## 2018-11-01 RX ORDER — ONDANSETRON 2 MG/ML
4 INJECTION INTRAMUSCULAR; INTRAVENOUS EVERY 6 HOURS PRN
Status: DISCONTINUED | OUTPATIENT
Start: 2018-11-01 | End: 2018-11-01

## 2018-11-01 RX ORDER — SODIUM CHLORIDE 0.9 % (FLUSH) 0.9 %
10 SYRINGE (ML) INJECTION PRN
Status: DISCONTINUED | OUTPATIENT
Start: 2018-11-01 | End: 2018-11-02 | Stop reason: HOSPADM

## 2018-11-01 RX ORDER — ONDANSETRON 2 MG/ML
4 INJECTION INTRAMUSCULAR; INTRAVENOUS EVERY 6 HOURS PRN
Status: DISCONTINUED | OUTPATIENT
Start: 2018-11-01 | End: 2018-11-02 | Stop reason: HOSPADM

## 2018-11-01 RX ORDER — FLUTICASONE PROPIONATE 50 MCG
2 SPRAY, SUSPENSION (ML) NASAL DAILY
Status: DISCONTINUED | OUTPATIENT
Start: 2018-11-01 | End: 2018-11-02 | Stop reason: HOSPADM

## 2018-11-01 RX ORDER — IBUPROFEN 800 MG/1
800 TABLET ORAL EVERY 8 HOURS PRN
Status: DISCONTINUED | OUTPATIENT
Start: 2018-11-01 | End: 2018-11-02 | Stop reason: HOSPADM

## 2018-11-01 RX ORDER — DIPHENHYDRAMINE HYDROCHLORIDE 50 MG/ML
25 INJECTION INTRAMUSCULAR; INTRAVENOUS EVERY 6 HOURS PRN
Status: DISCONTINUED | OUTPATIENT
Start: 2018-11-01 | End: 2018-11-01

## 2018-11-01 RX ORDER — PENICILLIN G 3000000 [IU]/50ML
3 INJECTION, SOLUTION INTRAVENOUS EVERY 4 HOURS
Status: DISCONTINUED | OUTPATIENT
Start: 2018-11-01 | End: 2018-11-01

## 2018-11-01 RX ORDER — SODIUM CHLORIDE, SODIUM LACTATE, POTASSIUM CHLORIDE, CALCIUM CHLORIDE 600; 310; 30; 20 MG/100ML; MG/100ML; MG/100ML; MG/100ML
INJECTION, SOLUTION INTRAVENOUS CONTINUOUS
Status: DISCONTINUED | OUTPATIENT
Start: 2018-11-01 | End: 2018-11-01

## 2018-11-01 RX ORDER — ACETAMINOPHEN 325 MG/1
325 TABLET ORAL EVERY 4 HOURS PRN
Status: DISCONTINUED | OUTPATIENT
Start: 2018-11-01 | End: 2018-11-01

## 2018-11-01 RX ORDER — DOCUSATE SODIUM 100 MG/1
100 CAPSULE, LIQUID FILLED ORAL DAILY
Status: DISCONTINUED | OUTPATIENT
Start: 2018-11-01 | End: 2018-11-02 | Stop reason: HOSPADM

## 2018-11-01 RX ORDER — LANOLIN 100 %
OINTMENT (GRAM) TOPICAL PRN
Status: DISCONTINUED | OUTPATIENT
Start: 2018-11-01 | End: 2018-11-02 | Stop reason: HOSPADM

## 2018-11-01 RX ORDER — DIPHENHYDRAMINE HCL 25 MG
25 TABLET ORAL EVERY 6 HOURS PRN
Status: DISCONTINUED | OUTPATIENT
Start: 2018-11-01 | End: 2018-11-01

## 2018-11-01 RX ORDER — ACETAMINOPHEN 325 MG/1
650 TABLET ORAL EVERY 4 HOURS PRN
Status: DISCONTINUED | OUTPATIENT
Start: 2018-11-01 | End: 2018-11-02 | Stop reason: HOSPADM

## 2018-11-01 RX ORDER — DOCUSATE SODIUM 100 MG/1
100 CAPSULE, LIQUID FILLED ORAL 2 TIMES DAILY
Status: DISCONTINUED | OUTPATIENT
Start: 2018-11-01 | End: 2018-11-01

## 2018-11-01 RX ORDER — SODIUM CHLORIDE 0.9 % (FLUSH) 0.9 %
10 SYRINGE (ML) INJECTION PRN
Status: DISCONTINUED | OUTPATIENT
Start: 2018-11-01 | End: 2018-11-01

## 2018-11-01 RX ORDER — SODIUM CHLORIDE 0.9 % (FLUSH) 0.9 %
10 SYRINGE (ML) INJECTION EVERY 12 HOURS SCHEDULED
Status: DISCONTINUED | OUTPATIENT
Start: 2018-11-01 | End: 2018-11-01

## 2018-11-01 RX ADMIN — SODIUM CHLORIDE, POTASSIUM CHLORIDE, SODIUM LACTATE AND CALCIUM CHLORIDE: 600; 310; 30; 20 INJECTION, SOLUTION INTRAVENOUS at 11:53

## 2018-11-01 RX ADMIN — DEXTROSE MONOHYDRATE: 5 INJECTION INTRAVENOUS at 11:54

## 2018-11-01 RX ADMIN — SODIUM CHLORIDE, POTASSIUM CHLORIDE, SODIUM LACTATE AND CALCIUM CHLORIDE: 600; 310; 30; 20 INJECTION, SOLUTION INTRAVENOUS at 12:39

## 2018-11-01 RX ADMIN — Medication 5 ML: at 12:36

## 2018-11-01 RX ADMIN — FAMOTIDINE 20 MG: 20 TABLET ORAL at 18:52

## 2018-11-01 RX ADMIN — ONDANSETRON 4 MG: 2 INJECTION INTRAMUSCULAR; INTRAVENOUS at 14:00

## 2018-11-01 RX ADMIN — Medication 12 ML/HR: at 12:40

## 2018-11-01 RX ADMIN — Medication 5 ML: at 12:32

## 2018-11-01 RX ADMIN — SODIUM CHLORIDE, PRESERVATIVE FREE 10 ML: 5 INJECTION INTRAVENOUS at 18:43

## 2018-11-01 NOTE — H&P
Department of Obstetrics and Gynecology  Attending Obstetrics History and Physical        CHIEF COMPLAINT:  abdominal pain, contractions, Active Labor    HISTORY OF PRESENT ILLNESS:      The patient is   who presents with regular uterine contractions. Patient has hd a pregnancy complicated but intermittent narcotic usage and Hepatitis C status and not treated. No vaginal bleeding no abnormal discharge. patient compliant with prenatal care. Past Medical History:    No past medical history on file. Past Surgical History:        Procedure Laterality Date    APPENDECTOMY       Social History:    TOBACCO:   reports that she has been smoking Cigarettes. She has a 15.00 pack-year smoking history. She has never used smokeless tobacco.  ETOH:   reports that she does not drink alcohol. DRUGS:   reports that she does not use drugs. SEXUAL HISTORY:    DOMESTIC VIOLENCE:  no  ACTIVITIES OF DAILY LIVING:    INSTRUMENTAL ACTIVITIES OF DAILY LIVING:  Patient is able to perform all instrumental activities of daily living. MARITAL STATUS:    OCCUPATION:    LEVEL OF EDUCATION:    Patient currently lives with family   Environmental/chemical exposure:  None  Travel History:  None   Pets:  None    Family History:   No family history on file. Medications Prior to Admission:  Prescriptions Prior to Admission: Othebh-VgZkl-EoCci-Meth-FA-DHA (PRENATE MINI) 18-0.6-0.4-350 MG CAPS, Take 1 tablet by mouth daily  Prenatal Vit-Fe Fumarate-FA (PREPLUS) 27-1 MG TABS,   cetirizine (ZYRTEC) 10 MG tablet, Take 1 tablet by mouth daily  fluticasone (FLONASE) 50 MCG/ACT nasal spray, 2 sprays by Nasal route daily  Allergies:  No known allergies    REVIEW OF SYSTEMS:    Patient has a history of depression  However treated and stable.   Patient has no symptoms of depression  CONSTITUTIONAL:  negative for  fevers and chills  RESPIRATORY:  negative for  dry cough and dyspnea  CARDIOVASCULAR:  negative for  chest pain, palpitations, syncope  GASTROINTESTINAL:  negative for nausea, vomiting, diarrhea and constipation  GENITOURINARY:  negative for frequency, dysuria and genital lesions and vaginal discharge  BEHAVIOR/PSYCH:  negative for agitated and anxiety    PHYSICAL EXAM:    General appearance:  awake, alert, cooperative, no apparent distress, and appears stated age  Neurologic:  Awake, alert, oriented to name, place and time. Cranial nerves II-XII are grossly intact. Motor is 5 out of 5 bilaterally. Cerebellar finger to nose, heel to shin intact. Sensory is intact.   Babinski down going, Romberg negative, and gait is normal.  Lungs:  No increased work of breathing, good air exchange, clear to auscultation bilaterally, no crackles or wheezing  Heart:  Normal apical impulse, regular rate and rhythm, normal S1 and S2, no S3 or S4, and no murmur noted  Abdomen:  No scars, normal bowel sounds, soft, non-distended, non-tender, no masses palpated, no hepatosplenomegally  Fetal heart rate:  Baseline Heart Rate 130, accelerations:  present  Pelvis:  External Genitalia: General appearance; normal, Hair distribution; normal, Lesions absent  Cervix:    DILATION:  Complete  EFFACEMENT:   100%  STATION:  +1 cm  CONSISTENCY: n/a  POSITION:  n/a      Contraction frequency:  3 minutes  Membranes:  Ruptured meconium stained        General Labs:   Admission on 11/01/2018   Component Date Value Ref Range Status    ABO/Rh 11/01/2018 O POS   Final    Antibody Screen 11/01/2018 NEG   Final    Hep B S Ag Interp 11/01/2018 Non-reactive   Final    Sodium 11/01/2018 127* 132 - 144 mEq/L Final    Potassium 11/01/2018 3.5  3.5 - 5.1 mEq/L Final    Chloride 11/01/2018 91* 98 - 107 mEq/L Final    CO2 11/01/2018 18* 22 - 29 mEq/L Final    Anion Gap 11/01/2018 18* 7 - 13 mEq/L Final    Glucose 11/01/2018 89  74 - 109 mg/dL Final    BUN 11/01/2018 7  6 - 20 mg/dL Final    CREATININE 11/01/2018 0.51  0.50 - 0.90 mg/dL Final    GFR Non-African American

## 2018-11-01 NOTE — ANESTHESIA PRE PROCEDURE
Department of Anesthesiology  Preprocedure Note       Name:  Shelia Cesar   Age:  25 y.o.  :  1990                                          MRN:  [id number]         Date:  2018      Surgeon: * No surgeons listed *    Procedure: ANESTHESIA LABOR ANALGESIA    Medications prior to admission:   Prior to Admission medications    Medication Sig Start Date End Date Taking?  Authorizing Provider   Gbwhvy-VjJfy-MxBcd-Meth-FA-DHA [facility]) 18-0.6-0.4-350 MG CAPS Take 1 tablet by mouth daily 18  Yes Tavo Lux, DO   Prenatal Vit-Fe Fumarate-FA (PREPLUS) 27-1 MG TABS  9/15/18   Historical Provider, MD   cetirizine (ZYRTEC) 10 MG tablet Take 1 tablet by mouth daily 18   OTTO Nicolas CNP   fluticasone [facility]) 50 MCG/ACT nasal spray 2 sprays by Nasal route daily 18   OTTO Nicolas CNP       Current medications:    Current Facility-Administered Medications   Medication Dose Route Frequency Provider Last Rate Last Dose    lactated ringers infusion   Intravenous Continuous Tavo Lux  mL/hr at 18 1239      sodium chloride flush 0.9 % injection 10 mL  10 mL Intravenous 2 times per day Tavo Lux,         sodium chloride flush 0.9 % injection 10 mL  10 mL Intravenous PRN Tavo Lux,         acetaminophen (TYLENOL) tablet 650 mg  650 mg Oral Q4H PRN Tavo Lux,         docusate sodium (COLACE) capsule 100 mg  100 mg Oral BID Tavo Lux, DO        ondansetron (ZOFRAN) injection 4 mg  4 mg Intravenous Q6H PRN Mamie Aguirre, DO        benzocaine-menthol (DERMOPLAST) 20-0.5 % spray   Topical PRN Tavo Lux, DO        nalbuphine (NUBAIN) injection 10 mg  10 mg Intravenous Q2H PRN Tavo Lux, DO        oxytocin (PITOCIN) 30 units in 500 mL infusion  1 gamaliel-units/min Intravenous Continuous Mamie Aguirre, DO        oxytocin (PITOCIN) 30 units in 500 mL infusion  1 gamaliel-units/min Intravenous

## 2018-11-01 NOTE — FLOWSHEET NOTE
Dr Heber Castillo updated on latest vag exam of 8cm/0/thin., bulging membranes, has epidural, + hep c, + urine tox cocaine

## 2018-11-01 NOTE — ANESTHESIA PROCEDURE NOTES
Epidural Block    Patient location during procedure: OB  Start time: 11/1/2018 12:15 PM  End time: 11/1/2018 12:40 PM  Reason for block: labor epidural  Staffing  Resident/CRNA: Samara Forrest  Performed: resident/CRNA   Preanesthetic Checklist  Completed: patient identified, site marked, surgical consent, pre-op evaluation, timeout performed, IV checked, risks and benefits discussed, monitors and equipment checked, anesthesia consent given, oxygen available and patient being monitored  Epidural  Patient position: sitting  Prep: ChloraPrep  Patient monitoring: cardiac monitor, continuous pulse ox and frequent blood pressure checks  Approach: midline  Location: lumbar (1-5)  Injection technique: AMMON saline  Provider prep: mask and sterile gloves  Needle  Needle type: Tuohy   Needle gauge: 17 G  Needle length: 3.5 in  Needle insertion depth: 5 cm  Catheter type: side hole  Catheter size: 20g.   Catheter at skin depth: 12 cm  Test dose: negative  Kit: perifix  Lot number: [id number]  Expiration date: 4/1/2020  Assessment  Sensory level: T6  Hemodynamics: stable  Attempts: 1  Additional Notes  No heme, no paresthesia

## 2018-11-01 NOTE — OP NOTE
PROCEDURE NOTE: VAGINAL DELIVERY  25 y.o.  at [address] [place] [address] who presented to [facility] triage in active  labor. Pt underwent expectant management and SROM noting meconium fluid. Pt with epidural for pain management. Pt then with rectal pressure and the desire to push. PT was instructed on pushing efforts and the infant's head was delivered atraumatically followed quickly by the rest of the body. Nuchal cord x 1 was noted which was reduced upon delivery. The infant with spontaneous cry was then laid on the mother's abdomen and the cord remained intact for 1 minute for delayed cord clamping. The cord was then clamped and cut and the infant was placed for skin to skin care. Three vessel cord noted. The cord blood was then drawn for labs and the placenta delivered spontaneously. The vaginal and cervix were inspected and no lacerations were noted. The infant, a viable female infant was born at [phone] 90 with Apgars 8 and 9 and wt pending  Mother was noted to have excellent uterine tone. Sponge and instrument counts were correct x 2 and mother and baby were recovered in room without difficulty.      EBL: 100cc    Mamie Danielson DO

## 2018-11-02 VITALS
WEIGHT: 172 LBS | HEART RATE: 102 BPM | TEMPERATURE: 97.6 F | DIASTOLIC BLOOD PRESSURE: 55 MMHG | SYSTOLIC BLOOD PRESSURE: 100 MMHG | HEIGHT: 66 IN | RESPIRATION RATE: 16 BRPM | BODY MASS INDEX: 27.64 KG/M2 | OXYGEN SATURATION: 98 %

## 2018-11-02 LAB
HCT VFR BLD CALC: 36.1 % (ref 37–47)
HEMOGLOBIN: 12.2 G/DL (ref 12–16)
MCH RBC QN AUTO: 32.5 PG (ref 27–31.3)
MCHC RBC AUTO-ENTMCNC: 33.9 % (ref 33–37)
MCV RBC AUTO: 96 FL (ref 82–100)
PDW BLD-RTO: 13.6 % (ref 11.5–14.5)
PLATELET # BLD: 234 K/UL (ref 130–400)
RBC # BLD: 3.76 M/UL (ref 4.2–5.4)
RPR: NORMAL
WBC # BLD: 16.5 K/UL (ref 4.8–10.8)

## 2018-11-02 PROCEDURE — 7200000001 HC VAGINAL DELIVERY

## 2018-11-02 PROCEDURE — 85027 COMPLETE CBC AUTOMATED: CPT

## 2018-11-02 PROCEDURE — 36415 COLL VENOUS BLD VENIPUNCTURE: CPT

## 2018-11-02 PROCEDURE — 6370000000 HC RX 637 (ALT 250 FOR IP): Performed by: OBSTETRICS & GYNECOLOGY

## 2018-11-02 RX ORDER — IBUPROFEN 800 MG/1
800 TABLET ORAL EVERY 8 HOURS PRN
Qty: 60 TABLET | Refills: 1 | Status: ON HOLD | OUTPATIENT
Start: 2018-11-02 | End: 2020-12-24

## 2018-11-02 RX ADMIN — IBUPROFEN 800 MG: 800 TABLET, FILM COATED ORAL at 01:48

## 2018-11-02 ASSESSMENT — PAIN SCALES - GENERAL: PAINLEVEL_OUTOF10: 4

## 2018-11-02 NOTE — DISCHARGE SUMMARY
Obstetrical Discharge Form    Gestational Age:39w6d  Antepartum complications: maternal positive screen for narcotic                                                  Nicotine addiction   Date of Delivery: 2018  Type of Delivery: vaginal, spontaneous  Delivered By:   Estelita Bansal          Baby:   Information for the patient's :  Luis Boone [[id number]]        Anesthesia: Epidural    Intrapartum complications: None    Postpartum complications: none    LABS:   Labs:    CBC:   Lab Results   Component Value Date    WBC 16.5 2018    RBC 3.76 2018    HGB 12.2 2018    HCT 36.1 2018    MCV 96.0 2018    RDW 13.6 2018     2018     P/E  Alert and oriented.   lochia normal  Uterus nontender below umbilicus  Lungs cta  Heart rrr      Discharge Medication:    Deion Gonzalez   Home Medication Instructions TF    Printed on:18 5114   Medication Information                      cetirizine (ZYRTEC) 10 MG tablet  Take 1 tablet by mouth daily             fluticasone (FLONASE) 50 MCG/ACT nasal spray  2 sprays by Nasal route daily             ibuprofen (ADVIL;MOTRIN) 800 MG tablet  Take 1 tablet by mouth every 8 hours as needed for Pain             Ahynjo-XjGjs-QlXsj-Meth-FA-DHA (PRENATE MINI) 18-0.6-0.4-350 MG CAPS  Take 1 tablet by mouth daily             Prenatal Vit-Fe Fumarate-FA (PREPLUS) 27-1 MG TABS                    Discharge Date: 2018     Plan:     Follow up in 2 week(s)    Mike Lara DO

## 2018-11-02 NOTE — FLOWSHEET NOTE
Pt requesting to leave floor to smoke. Given options for nicotine patch or discharge, pt requesting discharge. Dr Lindy Corona on unit, into see pt.  POC discussed and orders received

## 2020-12-23 ENCOUNTER — HOSPITAL ENCOUNTER (INPATIENT)
Age: 30
LOS: 3 days | Discharge: PSYCHIATRIC OR SUBSTANCE USE FACILITY | DRG: 817 | End: 2020-12-26
Attending: EMERGENCY MEDICINE | Admitting: INTERNAL MEDICINE
Payer: MEDICAID

## 2020-12-23 PROBLEM — T39.1X2A OVERDOSE ON TYLENOL, INTENTIONAL SELF-HARM, INITIAL ENCOUNTER (HCC): Status: ACTIVE | Noted: 2020-12-23

## 2020-12-23 LAB
ACETAMINOPHEN LEVEL: 57 UG/ML (ref 10–30)
ALBUMIN SERPL-MCNC: 4 G/DL (ref 3.5–4.6)
ALP BLD-CCNC: 108 U/L (ref 40–130)
ALT SERPL-CCNC: 42 U/L (ref 0–33)
AMPHETAMINE SCREEN, URINE: ABNORMAL
ANION GAP SERPL CALCULATED.3IONS-SCNC: 8 MEQ/L (ref 9–15)
AST SERPL-CCNC: 44 U/L (ref 0–35)
BARBITURATE SCREEN URINE: ABNORMAL
BASOPHILS ABSOLUTE: 0 K/UL (ref 0–0.2)
BASOPHILS RELATIVE PERCENT: 0.1 %
BENZODIAZEPINE SCREEN, URINE: POSITIVE
BILIRUB SERPL-MCNC: 0.3 MG/DL (ref 0.2–0.7)
BUN BLDV-MCNC: 14 MG/DL (ref 6–20)
CALCIUM SERPL-MCNC: 9.4 MG/DL (ref 8.5–9.9)
CANNABINOID SCREEN URINE: ABNORMAL
CHLORIDE BLD-SCNC: 105 MEQ/L (ref 95–107)
CK MB: 1.7 NG/ML (ref 0–3.8)
CO2: 23 MEQ/L (ref 20–31)
COCAINE METABOLITE SCREEN URINE: POSITIVE
CREAT SERPL-MCNC: 0.59 MG/DL (ref 0.5–0.9)
CREATINE KINASE-MB INDEX: 0.5 % (ref 0–3.5)
EKG ATRIAL RATE: 62 BPM
EKG P AXIS: 70 DEGREES
EKG P-R INTERVAL: 210 MS
EKG Q-T INTERVAL: 466 MS
EKG QRS DURATION: 84 MS
EKG QTC CALCULATION (BAZETT): 472 MS
EKG R AXIS: 77 DEGREES
EKG T AXIS: 45 DEGREES
EKG VENTRICULAR RATE: 62 BPM
EOSINOPHILS ABSOLUTE: 0.2 K/UL (ref 0–0.7)
EOSINOPHILS RELATIVE PERCENT: 3.3 %
ETHANOL PERCENT: NORMAL G/DL
ETHANOL: <10 MG/DL (ref 0–0.08)
GFR AFRICAN AMERICAN: >60
GFR NON-AFRICAN AMERICAN: >60
GLOBULIN: 2.2 G/DL (ref 2.3–3.5)
GLUCOSE BLD-MCNC: 111 MG/DL (ref 70–99)
HCG, URINE, POC: NEGATIVE
HCT VFR BLD CALC: 38.4 % (ref 37–47)
HEMOGLOBIN: 13.5 G/DL (ref 12–16)
INR BLD: 1
LYMPHOCYTES ABSOLUTE: 2.1 K/UL (ref 1–4.8)
LYMPHOCYTES RELATIVE PERCENT: 30.9 %
Lab: ABNORMAL
Lab: NORMAL
MCH RBC QN AUTO: 35.2 PG (ref 27–31.3)
MCHC RBC AUTO-ENTMCNC: 35.1 % (ref 33–37)
MCV RBC AUTO: 100.3 FL (ref 82–100)
METHADONE SCREEN, URINE: ABNORMAL
MONOCYTES ABSOLUTE: 0.6 K/UL (ref 0.2–0.8)
MONOCYTES RELATIVE PERCENT: 9.5 %
NEGATIVE QC PASS/FAIL: NORMAL
NEUTROPHILS ABSOLUTE: 3.7 K/UL (ref 1.4–6.5)
NEUTROPHILS RELATIVE PERCENT: 56.2 %
OPIATE SCREEN URINE: ABNORMAL
OXYCODONE URINE: ABNORMAL
PDW BLD-RTO: 12.6 % (ref 11.5–14.5)
PHENCYCLIDINE SCREEN URINE: ABNORMAL
PLATELET # BLD: 179 K/UL (ref 130–400)
POSITIVE QC PASS/FAIL: NORMAL
POTASSIUM SERPL-SCNC: 4.4 MEQ/L (ref 3.4–4.9)
PROPOXYPHENE SCREEN: ABNORMAL
PROTHROMBIN TIME: 12.8 SEC (ref 12.3–14.9)
RBC # BLD: 3.83 M/UL (ref 4.2–5.4)
SALICYLATE, SERUM: <0.3 MG/DL (ref 15–30)
SARS-COV-2, NAAT: NOT DETECTED
SODIUM BLD-SCNC: 136 MEQ/L (ref 135–144)
TOTAL CK: 351 U/L (ref 0–170)
TOTAL PROTEIN: 6.2 G/DL (ref 6.3–8)
TSH SERPL DL<=0.05 MIU/L-ACNC: 2.55 UIU/ML (ref 0.44–3.86)
WBC # BLD: 6.6 K/UL (ref 4.8–10.8)

## 2020-12-23 PROCEDURE — 80307 DRUG TEST PRSMV CHEM ANLYZR: CPT

## 2020-12-23 PROCEDURE — 6360000002 HC RX W HCPCS: Performed by: EMERGENCY MEDICINE

## 2020-12-23 PROCEDURE — 2580000003 HC RX 258: Performed by: INTERNAL MEDICINE

## 2020-12-23 PROCEDURE — 2500000003 HC RX 250 WO HCPCS: Performed by: EMERGENCY MEDICINE

## 2020-12-23 PROCEDURE — 99223 1ST HOSP IP/OBS HIGH 75: CPT | Performed by: INTERNAL MEDICINE

## 2020-12-23 PROCEDURE — 82553 CREATINE MB FRACTION: CPT

## 2020-12-23 PROCEDURE — 80053 COMPREHEN METABOLIC PANEL: CPT

## 2020-12-23 PROCEDURE — G0480 DRUG TEST DEF 1-7 CLASSES: HCPCS

## 2020-12-23 PROCEDURE — 85610 PROTHROMBIN TIME: CPT

## 2020-12-23 PROCEDURE — 6360000002 HC RX W HCPCS

## 2020-12-23 PROCEDURE — 96365 THER/PROPH/DIAG IV INF INIT: CPT

## 2020-12-23 PROCEDURE — 6360000002 HC RX W HCPCS: Performed by: INTERNAL MEDICINE

## 2020-12-23 PROCEDURE — 36415 COLL VENOUS BLD VENIPUNCTURE: CPT

## 2020-12-23 PROCEDURE — 2580000003 HC RX 258: Performed by: EMERGENCY MEDICINE

## 2020-12-23 PROCEDURE — 2000000000 HC ICU R&B

## 2020-12-23 PROCEDURE — 96375 TX/PRO/DX INJ NEW DRUG ADDON: CPT

## 2020-12-23 PROCEDURE — 84443 ASSAY THYROID STIM HORMONE: CPT

## 2020-12-23 PROCEDURE — U0002 COVID-19 LAB TEST NON-CDC: HCPCS

## 2020-12-23 PROCEDURE — 87040 BLOOD CULTURE FOR BACTERIA: CPT

## 2020-12-23 PROCEDURE — 93005 ELECTROCARDIOGRAM TRACING: CPT | Performed by: EMERGENCY MEDICINE

## 2020-12-23 PROCEDURE — 82550 ASSAY OF CK (CPK): CPT

## 2020-12-23 PROCEDURE — 85025 COMPLETE CBC W/AUTO DIFF WBC: CPT

## 2020-12-23 PROCEDURE — 99285 EMERGENCY DEPT VISIT HI MDM: CPT

## 2020-12-23 RX ORDER — 0.9 % SODIUM CHLORIDE 0.9 %
1000 INTRAVENOUS SOLUTION INTRAVENOUS ONCE
Status: COMPLETED | OUTPATIENT
Start: 2020-12-23 | End: 2020-12-23

## 2020-12-23 RX ORDER — ONDANSETRON 2 MG/ML
4 INJECTION INTRAMUSCULAR; INTRAVENOUS ONCE
Status: COMPLETED | OUTPATIENT
Start: 2020-12-23 | End: 2020-12-23

## 2020-12-23 RX ORDER — DOPAMINE HYDROCHLORIDE 160 MG/100ML
5 INJECTION, SOLUTION INTRAVENOUS CONTINUOUS
Status: DISCONTINUED | OUTPATIENT
Start: 2020-12-23 | End: 2020-12-26

## 2020-12-23 RX ORDER — PANTOPRAZOLE SODIUM 40 MG/10ML
40 INJECTION, POWDER, LYOPHILIZED, FOR SOLUTION INTRAVENOUS DAILY
Status: DISCONTINUED | OUTPATIENT
Start: 2020-12-23 | End: 2020-12-26

## 2020-12-23 RX ORDER — FLUMAZENIL 0.1 MG/ML
0.2 INJECTION, SOLUTION INTRAVENOUS ONCE
Status: COMPLETED | OUTPATIENT
Start: 2020-12-23 | End: 2020-12-23

## 2020-12-23 RX ORDER — ONDANSETRON 2 MG/ML
INJECTION INTRAMUSCULAR; INTRAVENOUS
Status: COMPLETED
Start: 2020-12-23 | End: 2020-12-23

## 2020-12-23 RX ORDER — SODIUM CHLORIDE 9 MG/ML
INJECTION, SOLUTION INTRAVENOUS CONTINUOUS
Status: DISCONTINUED | OUTPATIENT
Start: 2020-12-23 | End: 2020-12-26

## 2020-12-23 RX ORDER — ONDANSETRON 2 MG/ML
4 INJECTION INTRAMUSCULAR; INTRAVENOUS EVERY 6 HOURS PRN
Status: DISCONTINUED | OUTPATIENT
Start: 2020-12-23 | End: 2020-12-26 | Stop reason: HOSPADM

## 2020-12-23 RX ADMIN — ONDANSETRON 4 MG: 2 INJECTION INTRAMUSCULAR; INTRAVENOUS at 16:27

## 2020-12-23 RX ADMIN — SODIUM CHLORIDE 1000 ML: 9 INJECTION, SOLUTION INTRAVENOUS at 12:05

## 2020-12-23 RX ADMIN — SODIUM CHLORIDE 1000 ML: 9 INJECTION, SOLUTION INTRAVENOUS at 13:00

## 2020-12-23 RX ADMIN — ACETYLCYSTEINE 10540 MG: 200 INJECTION, SOLUTION INTRAVENOUS at 12:28

## 2020-12-23 RX ADMIN — ACETYLCYSTEINE 14060 MG: 200 INJECTION, SOLUTION INTRAVENOUS at 13:30

## 2020-12-23 RX ADMIN — FLUMAZENIL 0.2 MG: 0.1 INJECTION INTRAVENOUS at 12:23

## 2020-12-23 RX ADMIN — SODIUM CHLORIDE 1000 ML: 9 INJECTION, SOLUTION INTRAVENOUS at 12:24

## 2020-12-23 RX ADMIN — ACETYLCYSTEINE 7040 MG: 200 INJECTION, SOLUTION INTRAVENOUS at 18:21

## 2020-12-23 RX ADMIN — ENOXAPARIN SODIUM 40 MG: 40 INJECTION SUBCUTANEOUS at 18:22

## 2020-12-23 RX ADMIN — DOPAMINE HYDROCHLORIDE 5 MCG/KG/MIN: 160 INJECTION, SOLUTION INTRAVENOUS at 12:52

## 2020-12-23 RX ADMIN — SODIUM CHLORIDE: 9 INJECTION, SOLUTION INTRAVENOUS at 18:21

## 2020-12-23 ASSESSMENT — PAIN SCALES - GENERAL
PAINLEVEL_OUTOF10: 0

## 2020-12-23 NOTE — ED TRIAGE NOTES
Pt arrived via EMS from home. Mom called 911. Pt states she took 8 klonipin 2mg, 40 tylenol and 40 tizanidine at 2am. Pt states she wasn't trying to kill herself. Denies suicidal ideation.

## 2020-12-23 NOTE — ED PROVIDER NOTES
No family history on file.        SOCIAL HISTORY       Social History     Socioeconomic History    Marital status: Single     Spouse name: Not on file    Number of children: Not on file    Years of education: Not on file    Highest education level: Not on file   Occupational History    Not on file   Social Needs    Financial resource strain: Not on file    Food insecurity     Worry: Not on file     Inability: Not on file    Transportation needs     Medical: Not on file     Non-medical: Not on file   Tobacco Use    Smoking status: Current Every Day Smoker     Packs/day: 1.00     Years: 15.00     Pack years: 15.00     Types: Cigarettes    Smokeless tobacco: Never Used   Substance and Sexual Activity    Alcohol use: No     Alcohol/week: 12.0 standard drinks     Types: 12 Cans of beer per week     Comment: daily    Drug use: No     Types: Marijuana, IV, Cocaine, Opiates      Comment: weed daily    Sexual activity: Yes     Partners: Male   Lifestyle    Physical activity     Days per week: Not on file     Minutes per session: Not on file    Stress: Not on file   Relationships    Social connections     Talks on phone: Not on file     Gets together: Not on file     Attends Hinduism service: Not on file     Active member of club or organization: Not on file     Attends meetings of clubs or organizations: Not on file     Relationship status: Not on file    Intimate partner violence     Fear of current or ex partner: Not on file     Emotionally abused: Not on file     Physically abused: Not on file     Forced sexual activity: Not on file   Other Topics Concern    Not on file   Social History Narrative    Not on file       SCREENINGS                        PHYSICAL EXAM    (up to 7 for level 4, 8 or more for level 5)     ED Triage Vitals [12/23/20 1202]   BP Temp Temp Source Pulse Resp SpO2 Height Weight   (!) 73/45 97.9 °F (36.6 °C) Oral 65 17 99 % 5' 6\" (1.676 m) 155 lb (70.3 kg)       Physical Exam  Constitutional:       General: She is not in acute distress. Appearance: She is well-developed. She is not diaphoretic. Comments: Lethargic   HENT:      Head: Normocephalic and atraumatic. Right Ear: External ear normal.      Left Ear: External ear normal.      Mouth/Throat:      Pharynx: No oropharyngeal exudate. Eyes:      Conjunctiva/sclera: Conjunctivae normal.      Pupils: Pupils are equal, round, and reactive to light. Neck:      Musculoskeletal: Normal range of motion and neck supple. Thyroid: No thyromegaly. Vascular: No JVD. Trachea: No tracheal deviation. Cardiovascular:      Rate and Rhythm: Normal rate. Heart sounds: Normal heart sounds. No murmur. Pulmonary:      Effort: Pulmonary effort is normal. No respiratory distress. Breath sounds: Normal breath sounds. No wheezing. Abdominal:      General: Bowel sounds are normal. There is no distension. Palpations: Abdomen is soft. Tenderness: There is no abdominal tenderness. There is no guarding or rebound. Musculoskeletal: Normal range of motion. Skin:     General: Skin is warm and dry. Findings: No rash. Neurological:      Mental Status: She is alert and oriented to person, place, and time. Cranial Nerves: No cranial nerve deficit. Psychiatric:         Behavior: Behavior normal.         DIAGNOSTIC RESULTS     EKG: All EKG's are interpreted by the Emergency Department Physician who either signs or Co-signs this chart in the absence of a cardiologist.    Sinus rhythm with first-degree AV block.   62 bpm.  No acute ischemia    RADIOLOGY:   Non-plain film images such as CT, Ultrasound and MRI are read by the radiologist. Plain radiographic images are visualized and preliminarily interpreted by the emergency physician with the below findings:        Interpretation per the Radiologist below, if available at the time of this note:    No orders to display         ED BEDSIDE ULTRASOUND: Performed by ED Physician - none    LABS:  Labs Reviewed   CBC WITH AUTO DIFFERENTIAL   COMPREHENSIVE METABOLIC PANEL   URINE DRUG SCREEN   ETHANOL   URINE RT REFLEX TO CULTURE   ACETAMINOPHEN LEVEL   SALICYLATE LEVEL   PROTIME-INR   TSH WITHOUT REFLEX   CK   POC PREGNANCY UR-QUAL       All other labs were within normal range or not returned as of this dictation. EMERGENCY DEPARTMENT COURSE and DIFFERENTIAL DIAGNOSIS/MDM:   Vitals:    Vitals:    12/23/20 1202   BP: (!) 73/45   Pulse: 65   Resp: 17   Temp: 97.9 °F (36.6 °C)   TempSrc: Oral   SpO2: 99%   Weight: 155 lb (70.3 kg)   Height: 5' 6\" (1.676 m)       Patient is polysubstance overdose. I believe that the Zanaflex is causing severe hypotension. Certainly the Klonopin and Zanaflex will make her lethargic and altered. Patient is definitely Tylenol toxic at about 12 hours her level is over the toxic limit. Acetylcysteine IV was started immediately after she arrived. After speaking to poison control they recommended that I put the loading dose and the second dose together and run it at 4 hours to prevent anaphylaxis. After that she will get the third bag.  2 hours before the last bag is done she should have liver functions, INR and Tylenol level obtained. Patient's blood pressure is better at this time but she has received IV fluids and is on low-dose dopamine. She is going to ICU    [address]   Total Critical Care time was 45 minutes, excluding separately reportable procedures. There was a high probability of clinically significant/life threatening deterioration in the patient's condition which required my urgent intervention. CONSULTS:  None    PROCEDURES:  Unless otherwise noted below, none     Procedures        FINAL IMPRESSION      1. Accidental acetaminophen overdose, initial encounter    2. Polysubstance overdose, intentional self-harm, initial encounter ([facility] [address]    3.  Toxic effect of acetaminophen, intentional self-harm, initial encounter ([facility] [address]    4. Hypotension due to drugs          DISPOSITION/PLAN   DISPOSITION  admit      PATIENT REFERRED TO:  No follow-up provider specified. DISCHARGE MEDICATIONS:  New Prescriptions    No medications on file     Controlled Substances Monitoring:     No flowsheet data found.     (Please note that portions of this note were completed with a voice recognition program.  Efforts were made to edit the dictations but occasionally words are mis-transcribed.)    Maikol Aranda DO (electronically signed)  Attending Emergency Physician            Maikol Aranda DO  12/23/20 1640

## 2020-12-23 NOTE — ED NOTES
Pt resting quietly in ED cot w/no s/s of distress noted. Resp even and unlabored. Will continue to monitor pt.      Quynh Ackerman RN  12/23/20 5085

## 2020-12-23 NOTE — ED NOTES
This RN spoke to poison control at this time. Per poison controls recommendation, pt is to receive full course of acetylcysteine and 2 hours before last bag is complete, pt is to have tylenol level redrawn, as well as ALT, AST, and INR. Dr. Kamini Antonio advised.      Damir Jimenez RN  12/23/20 3021

## 2020-12-23 NOTE — ED NOTES
Pt's BP noted to be 73/45 at this time. Pt placed in trendelenburg. 1L NS infusing w/pressure bag. Pt noted to be lethargic, however responsive to all forms of stimuli and able to follow commands. Dr. Benson Bonilla advised.      Ann Marie Willis, RN  12/23/20 7563

## 2020-12-23 NOTE — ED NOTES
Pt resting quietly in ED cot w/no s/s of distress noted. Resp even and unlabored. Will continue to monitor pt.       Gregory Robbins RN  12/23/20 8115

## 2020-12-23 NOTE — ED NOTES
Bed: 03  Expected date:   Expected time:   Means of arrival:   Comments:     Anise Babinski, RN  12/23/20 7189

## 2020-12-23 NOTE — ED NOTES
Dr. Jamaal Benjamin advised of pt's BP 92/49. Per Dr. Jamaal Benjamin, dopamine infusion to be left at 5 mcg/kg/min. Will continue to monitor pt.       Juan Nair RN  12/23/20 4836

## 2020-12-23 NOTE — ED NOTES
Pt stating at this time that she drank too much last night and felt overwhelmed and took 8 2mg clonipine, 40 500 mg tylenol, and 40 xanaflex. Pt denies SI/HI. Pt is calm and cooperative. Pt denies cp, sob, n/v/d, fever, or chills. Pt A/Ox4, skin p/w/d, resp even and unlabored, msp's intact.      Quynh Ackerman RN  12/23/20 2102

## 2020-12-23 NOTE — CONSULTS
Pulmonary and Critical Care Medicine  Consult Note  Encounter Date: 2020 5:16 PM    Ms. Mary Carmen Linda is a 26 y.o. female  : 1990  Requesting Provider: Lisa Barker MD    Reason for request: Tylenol overdose            HISTORY OF PRESENT ILLNESS:    Patient is 26 y.o. presents with Tylenol overdose, she is awake and alert, she denies any nausea or vomiting, no abdominal pain, no shortness of breath, she has right hip pain for which she took Tylenol per report 40 tablets, however patient denies she also took Zanaflex 40 tablets and 8 tablets of 2 mg Klonopin. She is in no distress, does not drink alcohol, and denies any suicidal ideation. Past Medical History:    No past medical history on file. Past Surgical History:        Procedure Laterality Date    APPENDECTOMY         Social History:     reports that she has been smoking cigarettes. She has a 15.00 pack-year smoking history. She has never used smokeless tobacco. She reports that she does not drink alcohol or use drugs. Family History:   No family history on file. No family history of liver disease  Allergies:  No known allergies        MEDICATIONS during current hospitalization:    Continuous Infusions:   DOPamine 5 mcg/kg/min (20 1252)    sodium chloride         Scheduled Meds:   acetylcysteine (ACETADOTE) infusion *loading dose*  200 mg/kg Intravenous Once    acetylcysteine (ACETADOTE) infusion *third dose*  100 mg/kg Intravenous Once    enoxaparin  40 mg Subcutaneous Daily       PRN Meds:ondansetron        REVIEW OF SYSTEMS:  ROS: 10 organs review of system is done including general, psychological, ENT, hematological, endocrine, respiratory, cardiovascular, gastrointestinal, musculoskeletal, neurological,  allergy and Immunology is done and is otherwise negative.     PHYSICAL EXAM:    Vitals:  BP 99/68   Pulse 73   Temp 97.9 °F (36.6 °C) (Oral)   Resp 18   Ht 5' 6\" (1.676 m)   Wt 155 lb (70.3 kg)   SpO2 100% BMI 25.02 kg/m²     General: alert, cooperative, no distress  Head: normocephalic, atraumatic  Eyes:No gross abnormalities. ENT:  MMM no lesions  Neck:  supple and no masses  Chest : clear to auscultation bilaterally- no wheezes, rales or rhonchi, normal air movement, no respiratory distress  Heart[de-identified] Heart sounds are normal.  Regular rate and rhythm without murmur, gallop or rub. ABD:  symmetric, soft, non-tender, no guarding or rebound  Musculoskeletal : no cyanosis, no clubbing and no edema  Neuro:  Grossly normal  Skin: No rashes or nodules noted. Lymph node:  no cervical nodes  Urology: No Gonzalez   Psychiatric: appropriate        Data Review  Recent Labs     12/23/20  1215   WBC 6.6   HGB 13.5   HCT 38.4         Recent Labs     12/23/20  1215      K 4.4      CO2 23   BUN 14   CREATININE 0.59   GLUCOSE 111*       MV Settings: ABGs: No results for input(s): PHART, ZYZ1SWF, PO2ART, LDV6ODN, BEART, W6CEZWZW, KMF3CDA in the last 72 hours.   O2 Device: None (Room air)  No results found for: [address]    Radiology  No new imaging studies      Assessment, plan:   Patient is at risk due to  · Tylenol overdose  · Klonopin and Zanaflex overdose  · Hypotension, likely medication induced and dehydration  · Patient denies suicidal ideation    Recommendation    · N-acetyelcsteine per protocol  · Currently on dopamine will wean as tolerated  · Continue NS will increase rate 250 cc/h  · 500 cc LR bolus  · Monitor LFTs  · Monitor INR  · Monitor respiratory status    Thank you for consultation    Electronically signed by Rose Ball MD, [facility],  on 12/23/2020 at 5:16 PM

## 2020-12-23 NOTE — H&P
Patient is poor historian due to current medical status  is at his bedside smells of alcohol. Patient comes overdose and Tylenol toxicity already received N-acetylcysteine. ER doctor spoke with poison control and recommended continue with current management. Monitor LFTs and electrolytes. Unknown if this was a suicide attempt or accidental overdose. The patient has been taking 40 tablets of Zanaflex 40 tablets of 500 mg Tylenol and 8 tablets of 2 mg Klonopin. Patient also received flumenizil  In ER for over dose on benzodiazepine, patient hypotensive in ER start on dopamine. Her  patient drinks also 2 tall boys every day. Patient is lethargic but is able to protect airway. 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 No past medical history on file. Past Surgical History:   Procedure Laterality Date    APPENDECTOMY       Relationships   Social connections    Talks on phone: Not on file    Gets together: Not on file    Attends Restorationist service: Not on file    Active member of club or organization: Not on file    Attends meetings of clubs or organizations: Not on file    Relationship status: Not on file     No family history on file. No current facility-administered medications on file prior to encounter.       Current Outpatient Medications on File Prior to Encounter   Medication Sig Dispense Refill    ibuprofen (ADVIL;MOTRIN) 800 MG tablet Take 1 tablet by mouth every 8 hours as needed for Pain 60 tablet 1    Prenatal Vit-Fe Fumarate-FA (PREPLUS) 27-1 MG TABS   5    cetirizine (ZYRTEC) 10 MG tablet Take 1 tablet by mouth daily 30 tablet 0    fluticasone (FLONASE) 50 MCG/ACT nasal spray 2 sprays by Nasal route daily 1 Bottle 0    Lijymz-SxSmz-YjKwk-Meth-FA-DHA (PRENATE MINI) 18-0.6-0.4-350 MG CAPS Take 1 tablet by mouth daily 30 capsule 5       Review of Systems   Unable to perform ROS: Acuity of condition   Physical Exam  Constitutional:       Appearance: She is ill-appearing. HENT:      Head: Normocephalic and atraumatic. Nose: Nose normal.      Mouth/Throat:      Mouth: Mucous membranes are moist.   Eyes:      General:         Right eye: No discharge. Left eye: No discharge. Pupils: Pupils are equal, round, and reactive to light. Cardiovascular:      Rate and Rhythm: Normal rate. Heart sounds: No murmur. Pulmonary:      Effort: No respiratory distress. Breath sounds: No wheezing. Abdominal:      General: There is no distension. Tenderness: There is no abdominal tenderness. Skin:     Coloration: Skin is not jaundiced. Neurological:      General: No focal deficit present. 1) overdose on tylenol  cw acetyl cytstenine  FU LFTs  GI eval  2) overdose on Zanaflex and klonipin  3) ?  Accidental or intentional overdose  cw one to one  Psych eval  4) hypotension   IVF  Dopamine  FU cultures  5) etoh abuse  Monitor for withdrwal  6) GI and DVT ppx

## 2020-12-24 ENCOUNTER — APPOINTMENT (OUTPATIENT)
Dept: INTERVENTIONAL RADIOLOGY/VASCULAR | Age: 30
DRG: 817 | End: 2020-12-24
Payer: MEDICAID

## 2020-12-24 LAB
ACETAMINOPHEN LEVEL: <5 UG/ML (ref 10–30)
ALBUMIN SERPL-MCNC: 3.3 G/DL (ref 3.5–4.6)
ALBUMIN SERPL-MCNC: 3.5 G/DL (ref 3.5–4.6)
ALBUMIN SERPL-MCNC: 3.8 G/DL (ref 3.5–4.6)
ALBUMIN SERPL-MCNC: 4.1 G/DL (ref 3.5–4.6)
ALP BLD-CCNC: 100 U/L (ref 40–130)
ALP BLD-CCNC: 116 U/L (ref 40–130)
ALP BLD-CCNC: 91 U/L (ref 40–130)
ALP BLD-CCNC: 96 U/L (ref 40–130)
ALT SERPL-CCNC: 66 U/L (ref 0–33)
ALT SERPL-CCNC: 73 U/L (ref 0–33)
ALT SERPL-CCNC: 73 U/L (ref 0–33)
ALT SERPL-CCNC: 75 U/L (ref 0–33)
ALT SERPL-CCNC: 78 U/L (ref 0–33)
ANION GAP SERPL CALCULATED.3IONS-SCNC: 10 MEQ/L (ref 9–15)
ANION GAP SERPL CALCULATED.3IONS-SCNC: 10 MEQ/L (ref 9–15)
ANION GAP SERPL CALCULATED.3IONS-SCNC: 9 MEQ/L (ref 9–15)
ANION GAP SERPL CALCULATED.3IONS-SCNC: 9 MEQ/L (ref 9–15)
AST SERPL-CCNC: 42 U/L (ref 0–35)
AST SERPL-CCNC: 52 U/L (ref 0–35)
AST SERPL-CCNC: 52 U/L (ref 0–35)
AST SERPL-CCNC: 60 U/L (ref 0–35)
AST SERPL-CCNC: 77 U/L (ref 0–35)
BASOPHILS ABSOLUTE: 0 K/UL (ref 0–0.2)
BASOPHILS RELATIVE PERCENT: 0.6 %
BILIRUB SERPL-MCNC: 0.3 MG/DL (ref 0.2–0.7)
BILIRUB SERPL-MCNC: 0.4 MG/DL (ref 0.2–0.7)
BILIRUB SERPL-MCNC: 0.4 MG/DL (ref 0.2–0.7)
BILIRUB SERPL-MCNC: <0.2 MG/DL (ref 0.2–0.7)
BUN BLDV-MCNC: 6 MG/DL (ref 6–20)
BUN BLDV-MCNC: 7 MG/DL (ref 6–20)
BUN BLDV-MCNC: 7 MG/DL (ref 6–20)
BUN BLDV-MCNC: 8 MG/DL (ref 6–20)
CALCIUM SERPL-MCNC: 8.2 MG/DL (ref 8.5–9.9)
CALCIUM SERPL-MCNC: 8.5 MG/DL (ref 8.5–9.9)
CALCIUM SERPL-MCNC: 8.6 MG/DL (ref 8.5–9.9)
CALCIUM SERPL-MCNC: 9 MG/DL (ref 8.5–9.9)
CHLORIDE BLD-SCNC: 106 MEQ/L (ref 95–107)
CHLORIDE BLD-SCNC: 108 MEQ/L (ref 95–107)
CHLORIDE BLD-SCNC: 108 MEQ/L (ref 95–107)
CHLORIDE BLD-SCNC: 109 MEQ/L (ref 95–107)
CO2: 21 MEQ/L (ref 20–31)
CO2: 23 MEQ/L (ref 20–31)
CO2: 24 MEQ/L (ref 20–31)
CO2: 24 MEQ/L (ref 20–31)
CREAT SERPL-MCNC: 0.52 MG/DL (ref 0.5–0.9)
CREAT SERPL-MCNC: 0.55 MG/DL (ref 0.5–0.9)
CREAT SERPL-MCNC: 0.56 MG/DL (ref 0.5–0.9)
CREAT SERPL-MCNC: 0.58 MG/DL (ref 0.5–0.9)
EOSINOPHILS ABSOLUTE: 0.3 K/UL (ref 0–0.7)
EOSINOPHILS RELATIVE PERCENT: 4.8 %
GFR AFRICAN AMERICAN: >60
GFR NON-AFRICAN AMERICAN: >60
GLOBULIN: 2 G/DL (ref 2.3–3.5)
GLOBULIN: 2.1 G/DL (ref 2.3–3.5)
GLOBULIN: 2.4 G/DL (ref 2.3–3.5)
GLOBULIN: 2.7 G/DL (ref 2.3–3.5)
GLUCOSE BLD-MCNC: 113 MG/DL (ref 70–99)
GLUCOSE BLD-MCNC: 129 MG/DL (ref 70–99)
GLUCOSE BLD-MCNC: 155 MG/DL (ref 70–99)
GLUCOSE BLD-MCNC: 83 MG/DL (ref 70–99)
HCT VFR BLD CALC: 41 % (ref 37–47)
HEMOGLOBIN: 13.7 G/DL (ref 12–16)
INR BLD: 1
INR BLD: 1.1
LYMPHOCYTES ABSOLUTE: 1.7 K/UL (ref 1–4.8)
LYMPHOCYTES RELATIVE PERCENT: 26.6 %
MAGNESIUM: 1.7 MG/DL (ref 1.7–2.4)
MCH RBC QN AUTO: 34.8 PG (ref 27–31.3)
MCHC RBC AUTO-ENTMCNC: 33.5 % (ref 33–37)
MCV RBC AUTO: 103.7 FL (ref 82–100)
MONOCYTES ABSOLUTE: 0.5 K/UL (ref 0.2–0.8)
MONOCYTES RELATIVE PERCENT: 7.3 %
NEUTROPHILS ABSOLUTE: 4 K/UL (ref 1.4–6.5)
NEUTROPHILS RELATIVE PERCENT: 60.7 %
PDW BLD-RTO: 12.6 % (ref 11.5–14.5)
PLATELET # BLD: 199 K/UL (ref 130–400)
POTASSIUM SERPL-SCNC: 3.4 MEQ/L (ref 3.4–4.9)
POTASSIUM SERPL-SCNC: 3.7 MEQ/L (ref 3.4–4.9)
POTASSIUM SERPL-SCNC: 3.8 MEQ/L (ref 3.4–4.9)
POTASSIUM SERPL-SCNC: 3.8 MEQ/L (ref 3.4–4.9)
PROTHROMBIN TIME: 13.7 SEC (ref 12.3–14.9)
PROTHROMBIN TIME: 14.4 SEC (ref 12.3–14.9)
RBC # BLD: 3.95 M/UL (ref 4.2–5.4)
SODIUM BLD-SCNC: 137 MEQ/L (ref 135–144)
SODIUM BLD-SCNC: 141 MEQ/L (ref 135–144)
SODIUM BLD-SCNC: 141 MEQ/L (ref 135–144)
SODIUM BLD-SCNC: 142 MEQ/L (ref 135–144)
TOTAL PROTEIN: 5.7 G/DL (ref 6.3–8)
TOTAL PROTEIN: 5.8 G/DL (ref 6.3–8)
TOTAL PROTEIN: 6.2 G/DL (ref 6.3–8)
TOTAL PROTEIN: 6.2 G/DL (ref 6.3–8)
WBC # BLD: 6.5 K/UL (ref 4.8–10.8)

## 2020-12-24 PROCEDURE — 2580000003 HC RX 258: Performed by: INTERNAL MEDICINE

## 2020-12-24 PROCEDURE — 84460 ALANINE AMINO (ALT) (SGPT): CPT

## 2020-12-24 PROCEDURE — 02HV33Z INSERTION OF INFUSION DEVICE INTO SUPERIOR VENA CAVA, PERCUTANEOUS APPROACH: ICD-10-PCS | Performed by: INTERNAL MEDICINE

## 2020-12-24 PROCEDURE — C9113 INJ PANTOPRAZOLE SODIUM, VIA: HCPCS | Performed by: INTERNAL MEDICINE

## 2020-12-24 PROCEDURE — 90792 PSYCH DIAG EVAL W/MED SRVCS: CPT | Performed by: PSYCHIATRY & NEUROLOGY

## 2020-12-24 PROCEDURE — 2000000000 HC ICU R&B

## 2020-12-24 PROCEDURE — 2500000003 HC RX 250 WO HCPCS: Performed by: INTERNAL MEDICINE

## 2020-12-24 PROCEDURE — 36415 COLL VENOUS BLD VENIPUNCTURE: CPT

## 2020-12-24 PROCEDURE — 99291 CRITICAL CARE FIRST HOUR: CPT | Performed by: INTERNAL MEDICINE

## 2020-12-24 PROCEDURE — 2709999900 IR PICC WO SQ PORT/PUMP > 5 YEARS

## 2020-12-24 PROCEDURE — 6360000002 HC RX W HCPCS: Performed by: INTERNAL MEDICINE

## 2020-12-24 PROCEDURE — 6370000000 HC RX 637 (ALT 250 FOR IP): Performed by: INTERNAL MEDICINE

## 2020-12-24 PROCEDURE — 85025 COMPLETE CBC W/AUTO DIFF WBC: CPT

## 2020-12-24 PROCEDURE — 99253 IP/OBS CNSLTJ NEW/EST LOW 45: CPT | Performed by: SPECIALIST

## 2020-12-24 PROCEDURE — 83735 ASSAY OF MAGNESIUM: CPT

## 2020-12-24 PROCEDURE — 85610 PROTHROMBIN TIME: CPT

## 2020-12-24 PROCEDURE — G0480 DRUG TEST DEF 1-7 CLASSES: HCPCS

## 2020-12-24 PROCEDURE — 84450 TRANSFERASE (AST) (SGOT): CPT

## 2020-12-24 PROCEDURE — 36573 INSJ PICC RS&I 5 YR+: CPT | Performed by: RADIOLOGY

## 2020-12-24 PROCEDURE — 80053 COMPREHEN METABOLIC PANEL: CPT

## 2020-12-24 RX ORDER — SODIUM CHLORIDE 0.9 % (FLUSH) 0.9 %
10 SYRINGE (ML) INJECTION EVERY 12 HOURS SCHEDULED
Status: DISCONTINUED | OUTPATIENT
Start: 2020-12-24 | End: 2020-12-26

## 2020-12-24 RX ORDER — POTASSIUM CHLORIDE 20 MEQ/1
40 TABLET, EXTENDED RELEASE ORAL ONCE
Status: COMPLETED | OUTPATIENT
Start: 2020-12-24 | End: 2020-12-24

## 2020-12-24 RX ORDER — LIDOCAINE HYDROCHLORIDE 20 MG/ML
5 INJECTION, SOLUTION INFILTRATION; PERINEURAL ONCE
Status: COMPLETED | OUTPATIENT
Start: 2020-12-24 | End: 2020-12-24

## 2020-12-24 RX ORDER — SODIUM CHLORIDE 0.9 % (FLUSH) 0.9 %
10 SYRINGE (ML) INJECTION PRN
Status: DISCONTINUED | OUTPATIENT
Start: 2020-12-24 | End: 2020-12-26

## 2020-12-24 RX ORDER — NICOTINE 21 MG/24HR
1 PATCH, TRANSDERMAL 24 HOURS TRANSDERMAL DAILY
Status: DISCONTINUED | OUTPATIENT
Start: 2020-12-24 | End: 2020-12-26 | Stop reason: HOSPADM

## 2020-12-24 RX ORDER — MAGNESIUM SULFATE IN WATER 40 MG/ML
2 INJECTION, SOLUTION INTRAVENOUS ONCE
Status: COMPLETED | OUTPATIENT
Start: 2020-12-24 | End: 2020-12-24

## 2020-12-24 RX ORDER — SODIUM CHLORIDE, SODIUM LACTATE, POTASSIUM CHLORIDE, AND CALCIUM CHLORIDE .6; .31; .03; .02 G/100ML; G/100ML; G/100ML; G/100ML
500 INJECTION, SOLUTION INTRAVENOUS ONCE
Status: COMPLETED | OUTPATIENT
Start: 2020-12-24 | End: 2020-12-24

## 2020-12-24 RX ORDER — SODIUM CHLORIDE 9 MG/ML
250 INJECTION, SOLUTION INTRAVENOUS ONCE
Status: COMPLETED | OUTPATIENT
Start: 2020-12-24 | End: 2020-12-24

## 2020-12-24 RX ADMIN — MAGNESIUM SULFATE HEPTAHYDRATE 2 G: 40 INJECTION, SOLUTION INTRAVENOUS at 21:50

## 2020-12-24 RX ADMIN — SODIUM CHLORIDE, POTASSIUM CHLORIDE, SODIUM LACTATE AND CALCIUM CHLORIDE 500 ML: 600; 310; 30; 20 INJECTION, SOLUTION INTRAVENOUS at 09:30

## 2020-12-24 RX ADMIN — LIDOCAINE HYDROCHLORIDE 1 ML: 20 INJECTION, SOLUTION INFILTRATION; PERINEURAL at 16:21

## 2020-12-24 RX ADMIN — Medication 10 ML: at 21:54

## 2020-12-24 RX ADMIN — ENOXAPARIN SODIUM 40 MG: 40 INJECTION SUBCUTANEOUS at 21:46

## 2020-12-24 RX ADMIN — POTASSIUM CHLORIDE 40 MEQ: 20 TABLET, EXTENDED RELEASE ORAL at 21:46

## 2020-12-24 RX ADMIN — SODIUM CHLORIDE 250 ML: 9 INJECTION, SOLUTION INTRAVENOUS at 16:21

## 2020-12-24 RX ADMIN — PANTOPRAZOLE SODIUM 40 MG: 40 INJECTION, POWDER, FOR SOLUTION INTRAVENOUS at 08:59

## 2020-12-24 RX ADMIN — ACETYLCYSTEINE 7040 MG: 200 INJECTION, SOLUTION INTRAVENOUS at 10:55

## 2020-12-24 RX ADMIN — DOPAMINE HYDROCHLORIDE 5 MCG/KG/MIN: 160 INJECTION, SOLUTION INTRAVENOUS at 05:58

## 2020-12-24 ASSESSMENT — PAIN SCALES - GENERAL
PAINLEVEL_OUTOF10: 0

## 2020-12-24 NOTE — PROGRESS NOTES
Visitor was told when first came as he brought in jogging pants with string and deodorant, and brush. Visitor was informed the items have to be logged in and pt cant have pants with string.

## 2020-12-24 NOTE — PROGRESS NOTES
Complete bed change done. Medical doctor in to see pt, pt requested nict patch, reported birth control strip in left post inner arm to medical doctor .

## 2020-12-24 NOTE — CARE COORDINATION
[facility] Case Management Initial Discharge Assessment    Met with Patient to discuss discharge plan. PCP: No primary care provider on file. Date of Last Visit: years    If no PCP, list provided? She needs a referral to [facility] when she is able to discuss this. Discharge Planning    Living Arrangements: independently at home    Who do you live with?     Who helps you with your care:  self    If lives at home:     Do you have any barriers navigating in your home? no    Patient can perform ADL? Yes    Current Services (outpatient and in home) :  None    Dialysis: No    Is transportation available to get to your appointments? Yes    DME Equipment:  no    Respiratory equipment: None    Respiratory provider:  no     Pharmacy:  no    Consult with Medication Assistance Program?   states that affording meds would be a problem. Does Patient Have a High-Risk for Readmission Diagnosis (CHF, PN, MI, COPD)? No          Initial Discharge Plan? (Note: please see concurrent daily documentation for any updates after initial note). If pt is d/c with scripts, please check with pt and  if they can afford them. CM to assess for further d/c needs and referrals.       Electronically signed by Mathew Shelby on 12/23/2020 at 9:09 PM

## 2020-12-24 NOTE — PROGRESS NOTES
Pt talked with doctor and expressed being upset with the turn out of having to go to the 3w, visitor expressed dont worry will get you out of here.    Escape precautions

## 2020-12-24 NOTE — PROGRESS NOTES
Interdisciplinary ICU Rounds:   Verbal Orders / Changes made by Pharmacy      acetylcysteine infusion Intravenous, Once *Repeat of 16 hour dose*    Thank you,    Aleida Paez, PharmD.   11:11 AM 12/24/2020

## 2020-12-24 NOTE — PROGRESS NOTES
2300 received report from Day shift RN   0000 assessment complete redressed iv dressing in left ac iv in right Ac was removed by patient Upon assessment   56 messaged dr Jigna Santana about getting a diet order in   0100 pt asked if her family was aware of her being admitted told her that her mother called 46 to get her to hospital so they are aware pt verbalized understanding. 0400 assessment preformed pt sleeping on side no distress noted   0500 IV in Right ac started leaking from site new iv placed in left forearm and iv in right ac removed   0600 NS infusion stopped was told by day shift Rn to stop infusion when bag was empty.   Electronically signed by Unique Keenan RN on 12/24/2020 at 7:37 AM

## 2020-12-24 NOTE — PROGRESS NOTES
Patient repeatedly states that she does not want to stay in hospital. Explained that she has to stay for her safety. She insists she never would want to kill herself and that she just takes tylenol by the handful all the time. When mentioning the other medications she took she changes her story each time she's asked about it. Pt states she is an alcoholic and hasn't had a drink in two days and that's why she wants to get out of the hospital. Spoke to Dr. Delores Rowley about this, explained pt is not currently showing any signs/symptoms of withdraw at this time.

## 2020-12-24 NOTE — PROGRESS NOTES
Patient in bed with eyes closed arouses to name being called. Wants to sleep will com back and do assessment. When asked about what happened right before ingestion patient became a little guarded and stated \" her day was great. .. nothing happened\". Nurse reassured her she just wants to try and fix the situation or the feeling she had that triggered the ingestion of pills. Patient stated she didnt want to talk about it. Sitting up eating breakfast independently. Staff 1:1. Dopamine had to be increased due to BP in 70s and 60s. 5682 lab here drawing labs per poison control recommendation. Reconciled home meds and patient denies any home meds. Denies any knowledge of the alleged muscle relaxers and benzos ingested. 0930  Poison control called and given updated Dr. Haider Durant notified of their requests. Orders received.  at bedside and updated. Patient denies intentional OD says she was drunk and had pain so she mustve just  kept taking pills not realizing how much she took. 1400 Specials called with ETA  1615 patient taken to specials accompanied by  and RN. Returned at [phone].  volunteered to bathe wife.

## 2020-12-24 NOTE — ACP (ADVANCE CARE PLANNING)
Advance Care Planning     Advance Care Planning Activator (Inpatient)  Conversation Note      Date of ACP Conversation: 12/23/2020    Conversation Conducted with: Patient with Decision Making Capacity    ACP Activator: [address] makes decisions on behalf of the incapacitated patient: Decision Maker is asked to consider and make decisions based on patient values, known preferences, or best interests. Health Care Decision Maker:     Current Designated Health Care Decision Maker:   Primary Decision Maker: [facility] - [phone]  (If there is a [address] named in the [address] Makers\" box in the ACP activity, but it is not visible above, be sure to open that field and then select the health care decision maker relationship (ie \"primary\") in the blank space to the right of the name.) Validate  this information as still accurate & up-to-date; edit [place] field as needed.)    Note: Assess and validate information in current ACP documents, as indicated. If no Decision Maker listed above or available through scanned documents, then:    If no Authorized Decision Maker has previously been identified, then patient chooses [place]:  \"Who would you like to name as your primary health care decision-maker? \"               Name: Gabriel Infante        Relationship: grandma          Phone number: [phone]  Lenka Ovalle this person be reached easily? \" Yes  Note: If the relationship of these Decision-Makers to the patient does NOT follow your state's Next of Kin hierarchy, recommend that patient complete ACP document that meets state-specific requirements to allow them to act on the patient's behalf when appropriate. Care Preferences    Ventilation:   \"If you were in your present state of health and suddenly became very ill and were unable to breathe on your own, what would your preference be about the use of a ventilator (breathing machine) if it were available to you? \"      Would the patient desire the use of ventilator (breathing machine)?: yes    \"If your health worsens and it becomes clear that your chance of recovery is unlikely, what would your preference be about the use of a ventilator (breathing machine) if it were available to you? \"     Would the patient desire the use of ventilator (breathing machine)?: \"maybe\"      Resuscitation  \"CPR works best to restart the heart when there is a sudden event, like a heart attack, in someone who is otherwise healthy. Unfortunately, CPR does not typically restart the heart for people who have serious health conditions or who are very sick. \"    \"In the event your heart stopped as a result of an underlying serious health condition, would you want attempts to be made to restart your heart (answer \"yes\" for attempt to resuscitate) or would you prefer a natural death (answer \"no\" for do not attempt to resuscitate)? \" yes      NOTE: If the patient has a valid advance directive AND now provides care preference(s) that are inconsistent with that prior directive, advise the patient to consider either: creating a new advance directive that complies with state-specific requirements; or, if that is not possible, orally revoking that prior directive in accordance with state-specific requirements, which must be documented in the EHR. [x] Yes   [] No   Educated Patient / Cierra Bee regarding differences between Advance Directives and portable DNR orders.     Length of ACP Conversation in minutes:  10  Conversation Outcomes:  [x] ACP discussion completed  [] Existing advance directive reviewed with patient; no changes to patient's previously recorded wishes  [] New Advance Directive completed  [] Portable Do Not Rescitate prepared for Provider review and signature  [] POLST/POST/MOLST/MOST prepared for Provider review and signature      Follow-up plan:    [] Schedule follow-up conversation to continue planning  [] Referred individual to Provider for additional questions/concerns   [] Advised patient/agent/surrogate to review completed ACP document and update if needed with changes in condition, patient preferences or care setting    [x] This note routed to one or more involved healthcare providers

## 2020-12-24 NOTE — CONSULTS
[address] [place] [address] Department of Psychiatry  Behavioral Health Consult    REASON FOR CONSULT: Overdose    CONSULTING PHYSICIAN:     History obtained from: patient    HISTORY OF PRESENT ILLNESS:      The patient is a 26 y.o. female  live with her , main carer of her  grandmother, 2 children not living with her, with no significant past psychiatric history of mental illness who presents with overdose  Patient overdosed on 40 tablets of Zanaflex 40 tablets of 500 mg Tylenol and 8 tablets of 2 mg Klonopin. Pt had 2 beers and was having severe headache. Was not sleeping  Pt took 2 klonopin and did not remember anything   Pt denies that this was an intentional overdose  Pt got the klonopin from mother in law  Blacked out and then took the medication  Pt is currently minimizing the circumstances  Denies any major stressor  Pt drink once or twice a week  Denies any drug use    The patient is not currently receiving care for the above psychiatric illness. Psychiatric Review of Systems       Depression: denies     Cammie or Hypomania:  no     Panic Attacks:  no     Phobias:  no     Obsessions and Compulsions:  no     PTSD : no     Hallucinations:  no     Delusions:  no      Substance Abuse History:  ETOH: as above  Marijuana: no  Opiates: no  Other Drugs: no      Past Psychiatric History:  Prior Diagnosis: denies any past illness  Psychiatrist: no  Therapist:no  Hospitalization:no  Hx of Suicidal Attempts: no  Hx of violence:  no  ECT: no    Past Medical History:    History reviewed. No pertinent past medical history.     Past Surgical History:        Procedure Laterality Date    APPENDECTOMY         Medications Prior to Admission:   Medications Prior to Admission: [DISCONTINUED] ibuprofen (ADVIL;MOTRIN) 800 MG tablet, Take 1 tablet by mouth every 8 hours as needed for Pain  [DISCONTINUED] Prenatal Vit-Fe Fumarate-FA (PREPLUS) 27-1 MG TABS,   [DISCONTINUED] cetirizine (ZYRTEC) 10 MG tablet, Take 1 tablet by mouth daily  [DISCONTINUED] fluticasone (FLONASE) 50 MCG/ACT nasal spray, 2 sprays by Nasal route daily  [DISCONTINUED] Bnbnyz-UkOuj-BeBnk-Meth-FA-DHA (PRENATE MINI) 18-0.6-0.4-350 MG CAPS, Take 1 tablet by mouth daily    Allergies:  No known allergies    FAMILY/SOCIAL HISTORY:  History reviewed. No pertinent family history.   Social History     Socioeconomic History    Marital status: Single     Spouse name: Not on file    Number of children: Not on file    Years of education: Not on file    Highest education level: Not on file   Occupational History    Not on file   Social Needs    Financial resource strain: Not on file    Food insecurity     Worry: Not on file     Inability: Not on file    Transportation needs     Medical: Not on file     Non-medical: Not on file   Tobacco Use    Smoking status: Current Every Day Smoker     Packs/day: 1.00     Years: 15.00     Pack years: 15.00     Types: Cigarettes    Smokeless tobacco: Never Used   Substance and Sexual Activity    Alcohol use: No     Alcohol/week: 12.0 standard drinks     Types: 12 Cans of beer per week     Comment: daily    Drug use: No     Types: Marijuana, IV, Cocaine, Opiates      Comment: weed daily    Sexual activity: Yes     Partners: Male   Lifestyle    Physical activity     Days per week: Not on file     Minutes per session: Not on file    Stress: Not on file   Relationships    Social connections     Talks on phone: Not on file     Gets together: Not on file     Attends Confucianism service: Not on file     Active member of club or organization: Not on file     Attends meetings of clubs or organizations: Not on file     Relationship status: Not on file    Intimate partner violence     Fear of current or ex partner: Not on file     Emotionally abused: Not on file     Physically abused: Not on file     Forced sexual activity: Not on file   Other Topics Concern    Not on file   Social History Narrative    Not on file       REVIEW OF SYSTEMS    Constitutional: [] fever  [] chills  [] weight loss  []weakness [] Other:  Eyes:  [] photophobia  [] discharge [] acuity change   [] Diplopia   [] Other:  HENT:  [] sore throat  [] ear pain [] Tinnitus   [] Other  Respiratory:  [] Cough  [] Shortness of breath   [] Sputum   [] Other:   Cardiac: []Chest pain   []Palpitations []Edema  []PND  [] Other:  GI:  []Abdominal pain   []Nausea  []Vomiting  []Diarrhea  [] Other:  :  [] Dysuria   []Frequency  []Hematuria  []Discharge  [] Other:  Possible Pregnancy: []Yes   []No   LMP:   Musculoskeletal:  []Back pain  []Neck pain  []Recent Injury   Skin:  []Rash  [] Itching  [] Other:  Neurologic:  [] Headache  [] Focal weakness  [] Sensory changes []Other:  Endocrine:  [] Polyuria  [] Polydipsia  [] Hair Loss  [] Other:  Lymphatic:   [] Swollen glands   Psychiatric:  As per HPI      All other systems negative except as marked or mentioned/indicated in the HPI. Wiliam Yates      PHYSICAL EXAM:  Vitals:  BP (!) 103/56   Pulse 66   Temp 98.4 °F (36.9 °C) (Oral)   Resp 19   Ht 5' 6\" (1.676 m)   Wt 155 lb (70.3 kg)   SpO2 97%   BMI 25.02 kg/m²      Neuro Exam:   Muscle Strength & Tone: full ROM  Gait: normal gait   Involuntary Movements: No    Mental Status Examination:    Level of consciousness:  within normal limits   Appearance:  ill-appearing  Behavior/Motor:  psychomotor retardation  Attitude toward examiner:  cooperative  Speech:  normal rate   Mood: anxious and minimizing depressed  Affect:  mood congruent  Thought processes:  slow   Thought content:  Suicidal Ideation:  Minimizing suicidal attempt, saying that she blacked out  Delusions:  no evidence of delusions  Perceptual Disturbance:  denies any perceptual disturbance  Cognition:  oriented to person, place, and time   Concentration distractible  Memory intact  Mini Mental Status not completed because   Insight poor   Judgement poor   Fund of Knowledge limited      DIAGNOSIS:     S/P Serious overdose   R/O MDD severe        RISK ASSESSMENT:        LABS: REVIEWED TODAY:  Recent Labs     12/23/20  1215 12/24/20  0500   WBC 6.6 6.5   HGB 13.5 13.7    199     Recent Labs     12/24/20  0046 12/24/20  0500 12/24/20  0816    142 141   K 3.4 3.8 3.8    109* 108*   CO2 21 23 24   BUN 8 7 6   CREATININE 0.52 0.58 0.55   GLUCOSE 129* 83 113*     Recent Labs     12/24/20  0046 12/24/20  0500 12/24/20  0816   BILITOT 0.3 0.4 0.4   ALKPHOS 100 91 96   AST 77* 60* 52*  52*   ALT 78* 73* 75*  73*     Lab Results   Component Value Date    LABAMPH Neg 12/23/2020    BARBSCNU Neg 12/23/2020    LABBENZ POSITIVE 12/23/2020    LABMETH Neg 12/23/2020    OPIATESCREENURINE Neg 12/23/2020    PHENCYCLIDINESCREENURINE Neg 12/23/2020    ETOH <10 12/23/2020     Lab Results   Component Value Date    TSH 2.550 12/23/2020     No results found for: LITHIUM  No results found for: VALPROATE, CBMZ  No results found for: LITHIUM, VALPROATE    FURTHER LABS ORDERED :      Radiology   No results found. EKG: TRACING REVIEWED    RECOMMENDATIONS    Risk Management:  suicide risk and 1:1 sitter    Based on my assessment, I am not able to confirm or rule out the severity of depression and if this relates to alcohol and klonopin use black out. Pt will need IP admit to 3 W when medically stable  Pt is currently pink slipped  Continue 1 to 1 sitter  Discussed with the treating physician/ team about the patient and treatment plan  Reviewed the chart    Discussed with the patient risk, benefit, alternative and common side effects for the  proposed medication treatment. Patient is consenting to the treatment. Thanks for the consult. Please call me if needed.     Electronically signed by Nato Reyes MD on 12/24/2020 at 10:26 AM

## 2020-12-24 NOTE — PROGRESS NOTES
Pt remains at bedside continuing to make negative comments about  Staff sitting at side, pt up to sink to brush teeth, visitor is expressing agitation along with pt wanting to be discharged, asking for 1:1 staff to be out of room, call light at side, reinforced to visitor and staff  the risk of pt falling staff at side .

## 2020-12-24 NOTE — PROGRESS NOTES
Physician Progress Note    2020   12:57 PM    Name:  Micheline Win  MRN:    [id number]     IP Day: 1     Admit Date: 2020 11:57 AM  PCP: No primary care provider on file. Code Status:  Prior      Assessment and Plan: Active Problems/ diagnosis:     Tylenol overdose-intent is not clear, she denies suicidality, Tylenol level was 57  Transaminitis  Hypotension  Taking medication that are not prescribed-Klonopin, she takes her  Klonopin as needed for anxiety. Tobacco use    Plan    -Patient is on dopamine infusion, keep MAP above 60  -Monitor LFTs daily  -She is on N-acetylcysteine infusion  -Psychiatry on board, she is pink slipped. -Monitor in ICU until off pressors. She is given a PICC line today. DVT PPx     Subjective:     Feels back to baseline.     Physical Examination:      Vitals:  BP (!) 103/56   Pulse 66   Temp 98.4 °F (36.9 °C) (Oral)   Resp 19   Ht 5' 6\" (1.676 m)   Wt 155 lb (70.3 kg)   SpO2 97%   BMI 25.02 kg/m²   Temp (24hrs), Av °F (36.7 °C), Min:97.5 °F (36.4 °C), Max:98.4 °F (36.9 °C)      General appearance: alert, cooperative and no distress  Mental Status: oriented to person, place and time and normal affect  Lungs: clear to auscultation bilaterally, normal effort  Heart: regular rate and rhythm, no murmur  Abdomen: soft, nontender, nondistended, bowel sounds present, no masses  Extremities: no edema, redness, tenderness in the calves  Skin: no gross lesions, rashes    Data:     Labs:  Recent Labs     20  1215 20  0500   WBC 6.6 6.5   HGB 13.5 13.7    199     Recent Labs     20  0500 20  0816    141   K 3.8 3.8   * 108*   CO2 23 24   BUN 7 6   CREATININE 0.58 0.55   GLUCOSE 83 113*     Recent Labs     20  0500 20  0816   AST 60* 52*  52*   ALT 73* 75*  73*   BILITOT 0.4 0.4   ALKPHOS 91 96       Current Facility-Administered Medications   Medication Dose Route Frequency Provider Last Rate Last Admin    lidocaine 2 % injection 5 mL  5 mL Intradermal Once Ilya Heart MD        sodium chloride flush 0.9 % injection 10 mL  10 mL Intravenous 2 times per day Ilya Heart MD        sodium chloride flush 0.9 % injection 10 mL  10 mL Intravenous PRN Ilya Heart MD        0.9 % sodium chloride infusion  250 mL Intravenous Once Ilya Heart MD        acetylcysteine (ACETADOTE) 7,040 mg in dextrose 5 % 1,000 mL infusion  100 mg/kg Intravenous Once Ilya Heart MD 64.7 mL/hr at 12/24/20 1055 7,040 mg at 12/24/20 1055    nicotine (NICODERM CQ) 21 MG/24HR 1 patch  1 patch Transdermal Daily Sherman Grey DO        DOPamine (INTROPIN) 400 mg in dextrose 5 % 250 mL infusion  5 mcg/kg/min Intravenous Continuous Leonora Hummel MD 19.8 mL/hr at 12/24/20 0900 7.5 mcg/kg/min at 12/24/20 0900    0.9 % sodium chloride infusion   Intravenous Continuous Leonora Hummel MD   Stopped at 12/24/20 0630    ondansetron (ZOFRAN) injection 4 mg  4 mg Intravenous Q6H PRN Leonora Hummel MD        enoxaparin (LOVENOX) injection 40 mg  40 mg Subcutaneous Daily Leonora Hummel MD   40 mg at 12/23/20 1822    pantoprazole (PROTONIX) injection 40 mg  40 mg Intravenous Daily Leonora Hummel MD   40 mg at 12/24/20 0894       Additional work up or/and treatment plan may be added today or then after based on clinical progression. I am managing a portion of pt care. Some medical issues are handled by other specialists. Additional work up and treatment should be done in out pt setting by pt PCP and other out pt providers. In addition to examining and evaluating pt, I spent additional time explaining care, normaland abnormal findings, and treatment plan. All of pt questions were answered. Counseling, diet and education were provided. Case will be discussed with nursing staff when appropriate. Family will be updated if and when appropriate.        Electronically signed by Sherman Grey DO on 12/24/2020 at 12:57 PM

## 2020-12-24 NOTE — CONSULTS
Consults    Patient Name: Urvashi Nunez  Admit Date: 2020 11:57 AM  MR #: [id number]  : 1990    Attending Physician: Micah Coello DO  Reason for consult: Abnormal liver enzymes    History of Presenting Illness:      Urvashi Nunez is a 26 y.o. female on hospital day 1 with a history of accidental ingestion of high-dose of acetaminophen. Patient was found to have abnormal liver enzymes and she received N-acetylcysteine as per poison control protocol. Patient has a history of alcohol abuse for many years, no symptoms suggestive of any decompensated liver disease, ports no abdominal pain nausea or vomiting. Serum acetaminophen level was 57 and repeat 1 is less than 5 history Obtained From:  patient  Patient    History:      History reviewed. No pertinent past medical history. Past Surgical History:   Procedure Laterality Date    APPENDECTOMY         Family History  History reviewed. No pertinent family history.   [] Unable to obtain due to ventilated and/ or neurologic status    Social History     Socioeconomic History    Marital status: Single     Spouse name: Not on file    Number of children: Not on file    Years of education: Not on file    Highest education level: Not on file   Occupational History    Not on file   Social Needs    Financial resource strain: Not on file    Food insecurity     Worry: Not on file     Inability: Not on file    Transportation needs     Medical: Not on file     Non-medical: Not on file   Tobacco Use    Smoking status: Current Every Day Smoker     Packs/day: 1.00     Years: 15.00     Pack years: 15.00     Types: Cigarettes    Smokeless tobacco: Never Used   Substance and Sexual Activity    Alcohol use: No     Alcohol/week: 12.0 standard drinks     Types: 12 Cans of beer per week     Comment: daily    Drug use: No     Types: Marijuana, IV, Cocaine, Opiates      Comment: weed daily    Sexual activity: Yes     Partners: Male   Lifestyle    Physical activity Reactions    No Known Allergies Other (See Comments)        Review of Systems:       [x] CV, Resp, Neuro, , and all other systems reviewed and negative other than listed in HPI.      [] Unable to obtain due to ventilated and/ or neurologic status      Objective Findings:     Vitals:   Vitals:    12/24/20 0530 12/24/20 0600 12/24/20 0630 12/24/20 0900   BP:  (!) 88/43 (!) 103/56    Pulse: 55 76 64 66   Resp: 17 20 19    Temp:       TempSrc:       SpO2: 97% 96% 97%    Weight:       Height:            Physical Examination:  General: In no acute distress  HEENT: Normocephalic,  scleral icterus. None, no stigmata of any chronic liver disease  Neck: No jugular venous distention. Heart: Regular, no murmur, no rub/gallop. No right ventricular heave. Lungs: Clear to ascultation, no rales/wheezing/rhonchi. Good chest wall excursion. Abdomen: Soft nontender no palpable mass  Extremities: No clubbing/cyanosis, no edema. Skin: Warm, dry, normal turgor, no rash, no bruise, no petichiae. Neuro: No myoclonus or tremor.    Psych: Normal affect    Results/ Medications reviewed 12/24/2020, 1:18 PM     Laboratory, Microbiology, Pathology, Radiology, Cardiology, Medications and Transcriptions reviewed  Scheduled Meds:   lidocaine  5 mL Intradermal Once    sodium chloride flush  10 mL Intravenous 2 times per day    acetylcysteine (ACETADOTE) infusion *third dose*  100 mg/kg Intravenous Once    nicotine  1 patch Transdermal Daily    enoxaparin  40 mg Subcutaneous Daily    pantoprazole  40 mg Intravenous Daily     Continuous Infusions:   sodium chloride      DOPamine 7.5 mcg/kg/min (12/24/20 0900)    sodium chloride Stopped (12/24/20 0630)       Recent Labs     12/23/20  1215 12/24/20  0500   WBC 6.6 6.5   HGB 13.5 13.7   HCT 38.4 41.0   .3* 103.7*    199     Recent Labs     12/24/20  0046 12/24/20  0500 12/24/20  0816    142 141   K 3.4 3.8 3.8    109* 108*   CO2 21 23 24   BUN 8 7 6 CREATININE 0.52 0.58 0.55     Recent Labs     12/24/20  0046 12/24/20  0500 12/24/20  0816   AST 77* 60* 52*  52*   ALT 78* 73* 75*  73*   BILITOT 0.3 0.4 0.4   ALKPHOS 100 91 96     No results for input(s): LIPASE, AMYLASE in the last 72 hours. Recent Labs     12/23/20  1215 12/24/20  0046 12/24/20  0500 12/24/20  0501 12/24/20  0816   PROT 6.2* 5.8* 5.7*  --  6.2*   INR 1.0  --   --  1.1 1.0     No results found. Impression:   Drug-induced liver injury, secondary to acetaminophen, patient received an acetylcysteine and liver enzymes are slowly improving, normal renal function. Patient also has history of alcohol use and this makes him more susceptible to drug-induced liver injury. Plan:   Follow LFT. Comments: Thank you for allowing us to participate in the care of this patient. Will continue to follow. Please call if questions or concerns arise.     Electronically signed by Krystin Ashraf MD on 12/24/2020 at 1:18 PM

## 2020-12-24 NOTE — PROGRESS NOTES
Lab here to draw blood, pt is eating breakfast now, assisted up to bathroom, pt voided large amount urin, pt denied dizziness, pt is requesting to go home, admits to drinking almost every day. Denies any seizure with withdraw, no tremors or sweats noted Pt is vague about plans when discharged. Pt states she wasn't trying to kill her self. Reports having a 3year old daughter, call placed to boyfriend telling him the visiting hours. Pt reports her mom is a nurse at [facility] [employer] and that she hasn't had a relationship with her in 5 years, not due to her wanting it.

## 2020-12-24 NOTE — PROGRESS NOTES
Pulmonary & Critical Care Medicine ICU Progress Note  Chief complaint : Shock/Tylenol overdose    Subjunctive/24 hour events :   Patient seen and examined during multidisciplinary rounds with RN, charge nurse, RT, pharmacy, dietitian, and social service. Sitting in bed, eating her lunch, no complaint, denies abdominal pain, no nausea no vomiting, appetite is good, continues to be on dopamine currently at a dose of 7 mcg/kg/min, no fever overnight, saturation 100% on room air, no lower extremity edema, no skin rash      Social History     Tobacco Use    Smoking status: Current Every Day Smoker     Packs/day: 1.00     Years: 15.00     Pack years: 15.00     Types: Cigarettes    Smokeless tobacco: Never Used   Substance Use Topics    Alcohol use: No     Alcohol/week: 12.0 standard drinks     Types: 12 Cans of beer per week     Comment: daily     History reviewed. No pertinent family history. No results for input(s): PHART, TQU8XSD, PO2ART in the last 72 hours. MV Settings:     / / /            IV:   sodium chloride      DOPamine 7.5 mcg/kg/min (12/24/20 0900)    sodium chloride Stopped (12/24/20 0630)       Vitals:  BP (!) 103/56   Pulse 66   Temp 98.4 °F (36.9 °C) (Oral)   Resp 19   Ht 5' 6\" (1.676 m)   Wt 155 lb (70.3 kg)   SpO2 97%   BMI 25.02 kg/m²    Tmax:        Intake/Output Summary (Last 24 hours) at 12/24/2020 1039  Last data filed at 12/24/2020 0847  Gross per 24 hour   Intake 5622 ml   Output --   Net 5622 ml       EXAM:  General: alert, cooperative, no distress  Head: normocephalic, atraumatic  Eyes:No gross abnormalities. ENT:  MMM no lesions  Neck:  supple and no masses  Chest : clear to auscultation bilaterally- no wheezes, rales or rhonchi, normal air movement, no respiratory distress  Heart[de-identified] Heart sounds are normal.  Regular rate and rhythm without murmur, gallop or rub.   ABD:  symmetric, soft, non-tender, soft, no guarding no rebound, no hepatomegaly  Musculoskeletal : no cyanosis, no clubbing and no edema  Neuro:  Grossly normal  Skin: No rashes or nodules noted. Lymph node:  no cervical nodes  Urology: No Gonzalez   Psychiatric: appropriate    Medications:  Scheduled Meds:   lactated ringers bolus  500 mL Intravenous Once    lidocaine  5 mL Intradermal Once    sodium chloride flush  10 mL Intravenous 2 times per day    acetylcysteine (ACETADOTE) infusion *third dose*  100 mg/kg Intravenous Once    enoxaparin  40 mg Subcutaneous Daily    pantoprazole  40 mg Intravenous Daily       PRN Meds:  sodium chloride flush, ondansetron    Results: reviewed by me   CBC:   Recent Labs     12/23/20  1215 12/24/20  0500   WBC 6.6 6.5   HGB 13.5 13.7   HCT 38.4 41.0   .3* 103.7*    199     BMP:   Recent Labs     12/24/20  0046 12/24/20  0500 12/24/20  0816    142 141   K 3.4 3.8 3.8    109* 108*   CO2 21 23 24   BUN 8 7 6   CREATININE 0.52 0.58 0.55     LIVER PROFILE:   Recent Labs     12/24/20  0046 12/24/20  0500 12/24/20  0816   AST 77* 60* 52*  52*   ALT 78* 73* 75*  73*   BILITOT 0.3 0.4 0.4   ALKPHOS 100 91 96     PT/INR:   Recent Labs     12/23/20  1215 12/24/20  0501 12/24/20  0816   PROTIME 12.8 14.4 13.7   INR 1.0 1.1 1.0     APTT: No results for input(s): APTT in the last 72 hours. UA:No results for input(s): NITRITE, COLORU, PHUR, LABCAST, WBCUA, RBCUA, MUCUS, TRICHOMONAS, YEAST, BACTERIA, CLARITYU, SPECGRAV, LEUKOCYTESUR, UROBILINOGEN, BILIRUBINUR, BLOODU, GLUCOSEU, AMORPHOUS in the last 72 hours. Invalid input(s): KETONESU    Cultures:  None  Films:  Not done      Assessment:   This is a critically ill patient at risk of deterioration / death , needing close ICU monitoring and intervention due to below noted problems     · Tylenol overdose, liver function test slightly worse today  · Klonopin and Zanaflex overdose  · Shock requiring dopamine drip likely medication effect/dehydration    Recommendation  · Completed Acetadote dose, poison control requested repeating 16-hour dose due to worsening LFT  · Will repeat LFT at the end of the second dose  · Dopamine drip target mean arterial pressure 65-70  · LR 1 L bolus  · Wean pressors as tolerated  · PICC line today  · Continue to monitor LFT and INR  ·     Due to the immediate potential for life-threatening deterioration due to shock I spent 39 minutes providing critical care.  This time is excluding time spent performing procedures.           Electronically signed by Liz Frost MD,  [facility] ,on 12/24/2020 at 10:39 AM

## 2020-12-24 NOTE — PROGRESS NOTES
Assumed, 1:1 . Patient sitting up in bed, alert, oriented x 4,  watching television, consuming coffee. Will continue to monitor.

## 2020-12-24 NOTE — PROGRESS NOTES
Dr Rossy Danielle in to examine pt , pt was informed if she drinks in the future, her liver is at higher risk of damage. Pt is requesting to nap now. Rails up x2, no tremors or sweats noted, 1:1 remains all shift. Food order was taken by dietary.

## 2020-12-25 LAB
ACETAMINOPHEN LEVEL: <5 UG/ML (ref 10–30)
ALBUMIN SERPL-MCNC: 3.6 G/DL (ref 3.5–4.6)
ALBUMIN SERPL-MCNC: 3.7 G/DL (ref 3.5–4.6)
ALBUMIN SERPL-MCNC: 4 G/DL (ref 3.5–4.6)
ALP BLD-CCNC: 104 U/L (ref 40–130)
ALP BLD-CCNC: 110 U/L (ref 40–130)
ALP BLD-CCNC: 118 U/L (ref 40–130)
ALT SERPL-CCNC: 48 U/L (ref 0–33)
ALT SERPL-CCNC: 64 U/L (ref 0–33)
ALT SERPL-CCNC: 64 U/L (ref 0–33)
ANION GAP SERPL CALCULATED.3IONS-SCNC: 11 MEQ/L (ref 9–15)
ANION GAP SERPL CALCULATED.3IONS-SCNC: 7 MEQ/L (ref 9–15)
ANION GAP SERPL CALCULATED.3IONS-SCNC: 8 MEQ/L (ref 9–15)
AST SERPL-CCNC: 25 U/L (ref 0–35)
AST SERPL-CCNC: 37 U/L (ref 0–35)
AST SERPL-CCNC: 42 U/L (ref 0–35)
BASOPHILS ABSOLUTE: 0.1 K/UL (ref 0–0.2)
BASOPHILS RELATIVE PERCENT: 0.8 %
BILIRUB SERPL-MCNC: <0.2 MG/DL (ref 0.2–0.7)
BUN BLDV-MCNC: 7 MG/DL (ref 6–20)
BUN BLDV-MCNC: 7 MG/DL (ref 6–20)
BUN BLDV-MCNC: 8 MG/DL (ref 6–20)
CALCIUM SERPL-MCNC: 8.6 MG/DL (ref 8.5–9.9)
CALCIUM SERPL-MCNC: 8.6 MG/DL (ref 8.5–9.9)
CALCIUM SERPL-MCNC: 8.8 MG/DL (ref 8.5–9.9)
CHLORIDE BLD-SCNC: 103 MEQ/L (ref 95–107)
CHLORIDE BLD-SCNC: 104 MEQ/L (ref 95–107)
CHLORIDE BLD-SCNC: 106 MEQ/L (ref 95–107)
CO2: 23 MEQ/L (ref 20–31)
CO2: 24 MEQ/L (ref 20–31)
CO2: 26 MEQ/L (ref 20–31)
CREAT SERPL-MCNC: 0.54 MG/DL (ref 0.5–0.9)
CREAT SERPL-MCNC: 0.55 MG/DL (ref 0.5–0.9)
CREAT SERPL-MCNC: 0.65 MG/DL (ref 0.5–0.9)
EOSINOPHILS ABSOLUTE: 0.4 K/UL (ref 0–0.7)
EOSINOPHILS RELATIVE PERCENT: 4.4 %
GFR AFRICAN AMERICAN: >60
GFR NON-AFRICAN AMERICAN: >60
GLOBULIN: 2 G/DL (ref 2.3–3.5)
GLOBULIN: 2.2 G/DL (ref 2.3–3.5)
GLOBULIN: 2.7 G/DL (ref 2.3–3.5)
GLUCOSE BLD-MCNC: 102 MG/DL (ref 70–99)
GLUCOSE BLD-MCNC: 253 MG/DL (ref 70–99)
GLUCOSE BLD-MCNC: 94 MG/DL (ref 70–99)
HCT VFR BLD CALC: 39.5 % (ref 37–47)
HEMOGLOBIN: 13.6 G/DL (ref 12–16)
INR BLD: 1
INR BLD: 1.1
LYMPHOCYTES ABSOLUTE: 1.9 K/UL (ref 1–4.8)
LYMPHOCYTES RELATIVE PERCENT: 22.8 %
MCH RBC QN AUTO: 35.2 PG (ref 27–31.3)
MCHC RBC AUTO-ENTMCNC: 34.5 % (ref 33–37)
MCV RBC AUTO: 102.2 FL (ref 82–100)
MONOCYTES ABSOLUTE: 0.6 K/UL (ref 0.2–0.8)
MONOCYTES RELATIVE PERCENT: 7.7 %
NEUTROPHILS ABSOLUTE: 5.2 K/UL (ref 1.4–6.5)
NEUTROPHILS RELATIVE PERCENT: 64.3 %
PDW BLD-RTO: 12.4 % (ref 11.5–14.5)
PLATELET # BLD: 192 K/UL (ref 130–400)
POTASSIUM SERPL-SCNC: 3.6 MEQ/L (ref 3.4–4.9)
POTASSIUM SERPL-SCNC: 3.6 MEQ/L (ref 3.4–4.9)
POTASSIUM SERPL-SCNC: 3.7 MEQ/L (ref 3.4–4.9)
PROTHROMBIN TIME: 13.2 SEC (ref 12.3–14.9)
PROTHROMBIN TIME: 14.5 SEC (ref 12.3–14.9)
RBC # BLD: 3.87 M/UL (ref 4.2–5.4)
SODIUM BLD-SCNC: 136 MEQ/L (ref 135–144)
SODIUM BLD-SCNC: 137 MEQ/L (ref 135–144)
SODIUM BLD-SCNC: 139 MEQ/L (ref 135–144)
TOTAL PROTEIN: 5.7 G/DL (ref 6.3–8)
TOTAL PROTEIN: 6.2 G/DL (ref 6.3–8)
TOTAL PROTEIN: 6.3 G/DL (ref 6.3–8)
WBC # BLD: 8.1 K/UL (ref 4.8–10.8)

## 2020-12-25 PROCEDURE — 85610 PROTHROMBIN TIME: CPT

## 2020-12-25 PROCEDURE — 93010 ELECTROCARDIOGRAM REPORT: CPT | Performed by: INTERNAL MEDICINE

## 2020-12-25 PROCEDURE — 80053 COMPREHEN METABOLIC PANEL: CPT

## 2020-12-25 PROCEDURE — 2580000003 HC RX 258: Performed by: INTERNAL MEDICINE

## 2020-12-25 PROCEDURE — 36415 COLL VENOUS BLD VENIPUNCTURE: CPT

## 2020-12-25 PROCEDURE — 2060000000 HC ICU INTERMEDIATE R&B

## 2020-12-25 PROCEDURE — G0480 DRUG TEST DEF 1-7 CLASSES: HCPCS

## 2020-12-25 PROCEDURE — 99231 SBSQ HOSP IP/OBS SF/LOW 25: CPT | Performed by: SPECIALIST

## 2020-12-25 PROCEDURE — 85025 COMPLETE CBC W/AUTO DIFF WBC: CPT

## 2020-12-25 PROCEDURE — 6370000000 HC RX 637 (ALT 250 FOR IP): Performed by: INTERNAL MEDICINE

## 2020-12-25 PROCEDURE — 6360000002 HC RX W HCPCS: Performed by: INTERNAL MEDICINE

## 2020-12-25 PROCEDURE — C9113 INJ PANTOPRAZOLE SODIUM, VIA: HCPCS | Performed by: INTERNAL MEDICINE

## 2020-12-25 RX ORDER — SODIUM CHLORIDE, SODIUM LACTATE, POTASSIUM CHLORIDE, CALCIUM CHLORIDE 600; 310; 30; 20 MG/100ML; MG/100ML; MG/100ML; MG/100ML
INJECTION, SOLUTION INTRAVENOUS ONCE
Status: COMPLETED | OUTPATIENT
Start: 2020-12-25 | End: 2020-12-25

## 2020-12-25 RX ORDER — 0.9 % SODIUM CHLORIDE 0.9 %
1000 INTRAVENOUS SOLUTION INTRAVENOUS ONCE
Status: COMPLETED | OUTPATIENT
Start: 2020-12-25 | End: 2020-12-25

## 2020-12-25 RX ORDER — MIDODRINE HYDROCHLORIDE 5 MG/1
5 TABLET ORAL
Status: DISCONTINUED | OUTPATIENT
Start: 2020-12-25 | End: 2020-12-26 | Stop reason: HOSPADM

## 2020-12-25 RX ADMIN — MIDODRINE HYDROCHLORIDE 5 MG: 5 TABLET ORAL at 17:36

## 2020-12-25 RX ADMIN — SODIUM CHLORIDE 1000 ML: 9 INJECTION, SOLUTION INTRAVENOUS at 11:13

## 2020-12-25 RX ADMIN — SODIUM CHLORIDE, POTASSIUM CHLORIDE, SODIUM LACTATE AND CALCIUM CHLORIDE: 600; 310; 30; 20 INJECTION, SOLUTION INTRAVENOUS at 13:24

## 2020-12-25 RX ADMIN — ENOXAPARIN SODIUM 40 MG: 40 INJECTION SUBCUTANEOUS at 08:17

## 2020-12-25 RX ADMIN — DOPAMINE HYDROCHLORIDE 10 MCG/KG/MIN: 160 INJECTION, SOLUTION INTRAVENOUS at 04:14

## 2020-12-25 RX ADMIN — Medication 10 ML: at 08:17

## 2020-12-25 RX ADMIN — PANTOPRAZOLE SODIUM 40 MG: 40 INJECTION, POWDER, FOR SOLUTION INTRAVENOUS at 08:17

## 2020-12-25 RX ADMIN — MIDODRINE HYDROCHLORIDE 5 MG: 5 TABLET ORAL at 12:41

## 2020-12-25 ASSESSMENT — PAIN SCALES - GENERAL
PAINLEVEL_OUTOF10: 0

## 2020-12-25 NOTE — PROGRESS NOTES
1915 handoff preformed with day shift RN  2000 assessments preformed as charted patient had no complaints.   2015 Pt had a brief run of [facility] dr Courtney Jarrell notified asked for CMP with Mg   2115l labs called to Dr Courtney Jarrell ordered replacement K and Mg   2145 replacements given   2300 handoff given to Fernando Molina   Patient care provided as charted   Handoff given to RN @ at bedside  Electronically signed by Cole Infante RN on 12/24/2020 at 11:21 PM

## 2020-12-25 NOTE — PROGRESS NOTES
Assumed care of patient at 2330  Assessment complete. See flow sheets. Patient sitting up in bed. Sitter in room with her. Patient is pink slipped. Suicidal precautions in place. Magnesium, Acetylcysteine, and Dopamine infusing. Snack provided. Denies any pain or needs. 0010 Labs drawn per orders, gave to tech.

## 2020-12-25 NOTE — PROGRESS NOTES
0745 Assessment complete. See flow sheets. Patient sitting up in bed eating breakfast.   MAP remains around 60.    0815 AM medication given, tolerated well. 1:1 sitter remains with patient.

## 2020-12-25 NOTE — PROGRESS NOTES
Decreased Dopamine to 7.5mcg/hr. Had four BP's in one hour with map greater than 60. Removed IV in right hand per patients request, no complications. Changed dressing on PICC line, saturated with old blood.

## 2020-12-25 NOTE — PROGRESS NOTES
Increased Dopamine back up to 7.5mcg/kg BP 94/40 map 58 and 87/44 map of 58. Patient sleeping. No distress noted. 1:1 at bedside.

## 2020-12-25 NOTE — PROGRESS NOTES
Physician Progress Note    12/25/2020   10:58 AM    Name:  Fannie Amaral  MRN:    [id number]     IP Day: 2     Admit Date: 12/23/2020 11:57 AM  PCP: No primary care provider on file. Code Status:  Prior      Assessment and Plan: Active Problems/ diagnosis:     Tylenol overdose-intent is not clear, she denies suicidality, Tylenol level was 57  Transaminitis  Hypotension  Taking medication that are not prescribed-Klonopin, she takes her  Klonopin as needed for anxiety. Tobacco use    Plan  12/24  -Patient is on dopamine infusion, keep MAP above 60  -Monitor LFTs daily  -She is on N-acetylcysteine infusion  -Psychiatry on board, she is pink slipped. -Monitor in ICU until off pressors. She is given a PICC line today. 12/25  - bolus 1 liter NS  - HR ok  - wean off dopamine, keep MAP >55  - psych on board   - ok for floor once off dopamine   - got picc   - off NAC   - sitter, suicide precautions     DVT PPx     Subjective:     Feels back to baseline. No dizziness or lightheadedness. Eating well. Appetite ok. Physical Examination:      Vitals:  BP (!) 87/44   Pulse 110   Temp 98.4 °F (36.9 °C) (Oral)   Resp 16   Ht 5' 6\" (1.676 m)   Wt 155 lb (70.3 kg)   SpO2 99%   BMI 25.02 kg/m²   No data recorded.       General appearance: alert, cooperative and no distress  Mental Status: oriented to person, place and time and normal affect  Lungs: clear to auscultation bilaterally, normal effort  Heart: regular rate and rhythm, no murmur  Abdomen: soft, nontender, nondistended, bowel sounds present, no masses  Extremities: no edema, redness, tenderness in the calves  Skin: no gross lesions, rashes    Data:     Labs:  Recent Labs     12/24/20  0500 12/25/20  0600   WBC 6.5 8.1   HGB 13.7 13.6    192     Recent Labs     12/25/20  0115 12/25/20  0600    139   K 3.7 3.6    106   CO2 23 26   BUN 7 7   CREATININE 0.55 0.54   GLUCOSE 253* 102*     Recent Labs     12/25/20  0115 12/25/20  0600 AST 42* 37*   ALT 64* 64*   BILITOT <0.2 <0.2   ALKPHOS 118 110       Current Facility-Administered Medications   Medication Dose Route Frequency Provider Last Rate Last Admin    0.9 % sodium chloride bolus  1,000 mL Intravenous Once Concha Rolon DO        sodium chloride flush 0.9 % injection 10 mL  10 mL Intravenous 2 times per day Nirmala Mercado MD   10 mL at 12/25/20 0817    sodium chloride flush 0.9 % injection 10 mL  10 mL Intravenous PRN Nirmala Mercado MD        nicotine (NICODERM CQ) 21 MG/24HR 1 patch  1 patch Transdermal Daily Concha Rolon DO   1 patch at 12/24/20 1508    DOPamine (INTROPIN) 400 mg in dextrose 5 % 250 mL infusion  5 mcg/kg/min Intravenous Continuous Juan Phillip DO 13.2 mL/hr at 12/25/20 0929 5 mcg/kg/min at 12/25/20 0929    0.9 % sodium chloride infusion   Intravenous Continuous Clifton Ricardo MD   Stopped at 12/24/20 0630    ondansetron (ZOFRAN) injection 4 mg  4 mg Intravenous Q6H PRN Clifton Ricardo MD        enoxaparin (LOVENOX) injection 40 mg  40 mg Subcutaneous Daily Clifton Ricardo MD   40 mg at 12/25/20 0817    pantoprazole (PROTONIX) injection 40 mg  40 mg Intravenous Daily Clifton Ricardo MD   40 mg at 12/25/20 7049       Additional work up or/and treatment plan may be added today or then after based on clinical progression. I am managing a portion of pt care. Some medical issues are handled by other specialists. Additional work up and treatment should be done in out pt setting by pt PCP and other out pt providers. In addition to examining and evaluating pt, I spent additional time explaining care, normaland abnormal findings, and treatment plan. All of pt questions were answered. Counseling, diet and education were provided. Case will be discussed with nursing staff when appropriate. Family will be updated if and when appropriate.        Electronically signed by Concha Rolon DO on 12/25/2020 at 10:58 AM

## 2020-12-25 NOTE — PROGRESS NOTES
Labs drawn and sent to lab. Patient tolerated well. 1:1 sitter with patient. MAP staying around 60 with Dopamine at 7.5mcg.

## 2020-12-25 NOTE — PROGRESS NOTES
Constant observation continued. Resting in bed with eyes closed. Resp clear and even. No distress noted.

## 2020-12-25 NOTE — PROGRESS NOTES
Called [place] at poison control and gave latest labs: ALT 64, AST 42, INR 1.1 and Acetaminophen less than 5. He said OK to discontinue the Acetadote. OK to dc per Dr Josephine Davidson also.

## 2020-12-25 NOTE — PROGRESS NOTES
Decreased Dopamine drip to 7.5 mcg/kg/min , last 3 blood pressures had map greater than 60. Patient sleeping. No distress noted. Sitter in room.

## 2020-12-26 ENCOUNTER — HOSPITAL ENCOUNTER (INPATIENT)
Age: 30
LOS: 3 days | Discharge: HOME | DRG: 817 | End: 2020-12-29
Attending: PSYCHIATRY & NEUROLOGY | Admitting: PSYCHIATRY & NEUROLOGY
Payer: MEDICAID

## 2020-12-26 VITALS
BODY MASS INDEX: 24.91 KG/M2 | SYSTOLIC BLOOD PRESSURE: 101 MMHG | TEMPERATURE: 98.1 F | DIASTOLIC BLOOD PRESSURE: 41 MMHG | OXYGEN SATURATION: 99 % | HEART RATE: 84 BPM | WEIGHT: 155 LBS | HEIGHT: 66 IN | RESPIRATION RATE: 18 BRPM

## 2020-12-26 LAB — SARS-COV-2, NAAT: NOT DETECTED

## 2020-12-26 PROCEDURE — 6370000000 HC RX 637 (ALT 250 FOR IP): Performed by: INTERNAL MEDICINE

## 2020-12-26 PROCEDURE — G0378 HOSPITAL OBSERVATION PER HR: HCPCS

## 2020-12-26 PROCEDURE — G0379 DIRECT REFER HOSPITAL OBSERV: HCPCS

## 2020-12-26 PROCEDURE — 1240000000 HC EMOTIONAL WELLNESS R&B

## 2020-12-26 PROCEDURE — U0002 COVID-19 LAB TEST NON-CDC: HCPCS

## 2020-12-26 PROCEDURE — 6360000002 HC RX W HCPCS: Performed by: INTERNAL MEDICINE

## 2020-12-26 PROCEDURE — 2580000003 HC RX 258: Performed by: INTERNAL MEDICINE

## 2020-12-26 PROCEDURE — C9113 INJ PANTOPRAZOLE SODIUM, VIA: HCPCS | Performed by: INTERNAL MEDICINE

## 2020-12-26 RX ORDER — ONDANSETRON 2 MG/ML
4 INJECTION INTRAMUSCULAR; INTRAVENOUS EVERY 6 HOURS PRN
Status: CANCELLED | OUTPATIENT
Start: 2020-12-26

## 2020-12-26 RX ORDER — HALOPERIDOL 5 MG
5 TABLET ORAL EVERY 4 HOURS PRN
Status: DISCONTINUED | OUTPATIENT
Start: 2020-12-26 | End: 2020-12-29 | Stop reason: HOSPADM

## 2020-12-26 RX ORDER — MIDODRINE HYDROCHLORIDE 2.5 MG/1
5 TABLET ORAL
Status: CANCELLED | OUTPATIENT
Start: 2020-12-26

## 2020-12-26 RX ORDER — NICOTINE 21 MG/24HR
1 PATCH, TRANSDERMAL 24 HOURS TRANSDERMAL DAILY
Status: CANCELLED | OUTPATIENT
Start: 2020-12-26

## 2020-12-26 RX ORDER — HALOPERIDOL 5 MG/ML
5 INJECTION INTRAMUSCULAR EVERY 4 HOURS PRN
Status: DISCONTINUED | OUTPATIENT
Start: 2020-12-26 | End: 2020-12-29 | Stop reason: HOSPADM

## 2020-12-26 RX ORDER — MIDODRINE HYDROCHLORIDE 5 MG/1
5 TABLET ORAL
Status: DISCONTINUED | OUTPATIENT
Start: 2020-12-26 | End: 2020-12-29 | Stop reason: HOSPADM

## 2020-12-26 RX ORDER — NICOTINE 21 MG/24HR
1 PATCH, TRANSDERMAL 24 HOURS TRANSDERMAL DAILY
Status: DISCONTINUED | OUTPATIENT
Start: 2020-12-27 | End: 2020-12-29 | Stop reason: HOSPADM

## 2020-12-26 RX ORDER — TRAZODONE HYDROCHLORIDE 50 MG/1
50 TABLET ORAL NIGHTLY PRN
Status: DISCONTINUED | OUTPATIENT
Start: 2020-12-26 | End: 2020-12-29 | Stop reason: HOSPADM

## 2020-12-26 RX ORDER — MAGNESIUM HYDROXIDE/ALUMINUM HYDROXICE/SIMETHICONE 120; 1200; 1200 MG/30ML; MG/30ML; MG/30ML
30 SUSPENSION ORAL PRN
Status: DISCONTINUED | OUTPATIENT
Start: 2020-12-26 | End: 2020-12-29 | Stop reason: HOSPADM

## 2020-12-26 RX ORDER — ONDANSETRON 2 MG/ML
4 INJECTION INTRAMUSCULAR; INTRAVENOUS EVERY 6 HOURS PRN
Status: DISCONTINUED | OUTPATIENT
Start: 2020-12-26 | End: 2020-12-29 | Stop reason: HOSPADM

## 2020-12-26 RX ORDER — HYDROXYZINE PAMOATE 50 MG/1
50 CAPSULE ORAL EVERY 6 HOURS PRN
Status: DISCONTINUED | OUTPATIENT
Start: 2020-12-26 | End: 2020-12-29 | Stop reason: HOSPADM

## 2020-12-26 RX ORDER — ACETAMINOPHEN 325 MG/1
650 TABLET ORAL EVERY 4 HOURS PRN
Status: DISCONTINUED | OUTPATIENT
Start: 2020-12-26 | End: 2020-12-29 | Stop reason: HOSPADM

## 2020-12-26 RX ORDER — BENZTROPINE MESYLATE 1 MG/ML
2 INJECTION INTRAMUSCULAR; INTRAVENOUS 2 TIMES DAILY PRN
Status: DISCONTINUED | OUTPATIENT
Start: 2020-12-26 | End: 2020-12-29 | Stop reason: HOSPADM

## 2020-12-26 RX ORDER — HYDROXYZINE HYDROCHLORIDE 50 MG/ML
50 INJECTION, SOLUTION INTRAMUSCULAR EVERY 6 HOURS PRN
Status: DISCONTINUED | OUTPATIENT
Start: 2020-12-26 | End: 2020-12-29 | Stop reason: HOSPADM

## 2020-12-26 RX ADMIN — PANTOPRAZOLE SODIUM 40 MG: 40 INJECTION, POWDER, FOR SOLUTION INTRAVENOUS at 08:30

## 2020-12-26 RX ADMIN — MIDODRINE HYDROCHLORIDE 5 MG: 5 TABLET ORAL at 13:08

## 2020-12-26 RX ADMIN — ENOXAPARIN SODIUM 40 MG: 40 INJECTION SUBCUTANEOUS at 08:30

## 2020-12-26 RX ADMIN — Medication 10 ML: at 08:30

## 2020-12-26 RX ADMIN — MIDODRINE HYDROCHLORIDE 5 MG: 5 TABLET ORAL at 17:18

## 2020-12-26 RX ADMIN — MIDODRINE HYDROCHLORIDE 5 MG: 5 TABLET ORAL at 08:30

## 2020-12-26 ASSESSMENT — SLEEP AND FATIGUE QUESTIONNAIRES
AVERAGE NUMBER OF SLEEP HOURS: 5
DO YOU USE A SLEEP AID: NO
DO YOU HAVE DIFFICULTY SLEEPING: NO

## 2020-12-26 ASSESSMENT — PAIN SCALES - GENERAL: PAINLEVEL_OUTOF10: 0

## 2020-12-26 NOTE — PROGRESS NOTES
Pt presented to the unit via wheelchair from [address] [place] [address]. Pt oriented to unit and room. Skin check completed and skin intact. Pt denies SI, HI, and A/V hallucinations. Pt reports improved mood and states that her overdose was \"accidential and occurred in a blackout from taking too much of her 's mother's Klonopin\". Pt denies HX of psychiatric admissions or history of depression.

## 2020-12-26 NOTE — GROUP NOTE
Group Therapy Note    Date: 12/26/2020    Group Start Time: 1800  Group End Time: 2071  Group Topic: Recreational    [facility]    Sj Martinez        Group Therapy Note    Attendees: 7/10         Patient's Goal:  To play Wii [employer] with the group. Notes:  Patient played the game with peers.     Status After Intervention:  Improved    Participation Level: Interactive    Participation Quality: Appropriate and Attentive      Speech:  normal      Thought Process/Content: Logical      Affective Functioning: Congruent      Mood: euthymic      Level of consciousness:  Alert and Attentive      Response to Learning: Progressing to goal      Endings: None Reported    Modes of Intervention: Activity      Discipline Responsible: [employer]      Signature:  Sj Martinez

## 2020-12-26 NOTE — FLOWSHEET NOTE
Report called to Rod[facility]rguez Conroy on [facility]. Electronically signed by Sriram Bello RN on 12/26/2020 at 3:55 PM

## 2020-12-26 NOTE — DISCHARGE SUMMARY
Hospital Medicine Discharge Summary    Pato Rodrigues  :  1990  MRN:  [id number]    Admit date:  2020  Discharge date:  2020    Admitting Physician:  Devan Siegel DO  Primary Care Physician:  No primary care provider on file. Discharge Diagnoses:      Tylenol overdose-intent is not clear, she denies suicidality, Tylenol level was 57  Transaminitis  Hypotension  Taking medication that are not prescribed-Klonopin, she takes her  Klonopin as needed for anxiety. Tobacco use       Chief Complaint   Patient presents with   Vick Self Ingestion     Hospital Course:         Plan    -Patient is on dopamine infusion, keep MAP above 60  -Monitor LFTs daily  -She is on N-acetylcysteine infusion  -Psychiatry on board, she is pink slipped. -Monitor in ICU until off pressors. She is given a PICC line today.       - bolus 1 liter NS  - HR ok  - wean off dopamine, keep MAP >55  - psych on board   - ok for floor once off dopamine   - got picc   - off NAC   - sitter, suicide precautions       - pt is off dopamine since yesterday, doing well. Feels well this morning   - off saline  - resume midodrine  - dc to psych   - medically cleared        Exam on discharge:   BP (!) 104/45   Pulse 103   Temp 97.9 °F (36.6 °C) (Oral)   Resp 18   Ht 5' 6\" (1.676 m)   Wt 155 lb (70.3 kg)   SpO2 100%   BMI 25.02 kg/m²   General appearance: No apparent distress, appears stated age and cooperative. HEENT: Pupils equal, round, and reactive to light. Conjunctivae/corneas clear. Neck: Supple, with full range of motion. No jugular venous distention. Trachea midline. Respiratory:  Normal respiratory effort. Clear to auscultation, bilaterally without Rales/Wheezes/Rhonchi. Cardiovascular: Regular rate and rhythm with normal S1/S2 without murmurs, rubs or gallops. Abdomen: Soft, non-tender, non-distended with normal bowel sounds. Musculoskeletal: No clubbing, cyanosis or edema bilaterally.   Full range of Pt notified motion without deformity. Skin: Skin color, texture, turgor normal.  No rashes or lesions. Neuro: Non Focal. Symetrical motor and tone. Nl Comprehension, Alert,awake and oriented. NL CN. Symetrical tone and reflexes. Psychiatric: Alert and oriented, thought content appropriate, normal insight  Capillary Refill: Brisk,< 3 seconds   Peripheral Pulses: +2 palpable, equal bilaterally     Patient was seen by the following consultants while admitted to [facility]:   Consults:  [address]  IP CONSULT TO GI  IP CONSULT TO GI    Significant Diagnostic Studies:    Refer to chart     Please refer to chart if no studies are shown here    Ir Picc Wo Sq Port/pump > 5 Years    Result Date: 12/25/2020  IR PICC WO SQ PORT/PUMP > 5 YEARS: 12/24/2020 CLINICAL HISTORY:  IV medications or septic shock; including pressors . PROCEDURE: After discussing the procedure and possible complications with the patient, informed consent was obtained. The patient was placed on the Special Procedures table. Central venous catheter was inserted using a maximal sterile barrier technique which includes cap, mask, sterile gown, sterile gloves, sterile full-body drape, hand hygiene and 2% chlorhexidine for cutaneous antisepsis. A sterile ultrasound technique with sterile gel and sterile probe covers was also utilized. The right upper extremity was sterilely prepared using 2% chlorhexidine and then draped with sterile towels and a body-sized sterile drape. All personnel in the Special Procedures Room donned caps and surgical masks. In addition, the  and first assistant donned sterile gowns and gloves after proper hand cleansing. A pre-procedure time out was performed in order to assure the correct patient and procedure. Local anesthetic was administered. A peripheral vein was accessed with sonographic guidance. A sonographic spot image was obtained for documentation. A guidewire was advanced into the vein with fluoroscopic guidance and a sheath was placed over the guidewire. A 5-Australian 40 cm dual lumen PICC was advanced through the sheath, up the arm and into the central vasculature. It was positioned appropriately. The sheath was removed. The catheter was shown to aspirate and infuse properly. The flange of the catheter was affixed to the arm using a PICC securement device. A spot image of the chest showed the tip of the PICC line to lie in the cavoatrial junction. The patient tolerated the procedure well and without complications. SUCCESSFUL PICC PLACEMENT WITHOUT IMMEDIATE COMPLICATIONS. 2.4 mGy of fluoroscopy was used, with 1 fluoroscopic still saved. No diagnostic images were obtained. Discharge Medications: There are no discharge medications for this patient. Disposition:   IP psych   Condition at discharge: good     Activity: activity as tolerated    Total time taken for discharging this patient: 40 minutes. Greater than 70% of time was spent focused exclusively on this patient. Time was taken to review chart, discuss plans with consultants, reconciling medications, discussing plan answering questions with patient.      SignedJaneney Chimera  12/26/2020, 10:25 AM  ----------------------------------------------------------------------------------------------------------------------    Nichol Owusu,

## 2020-12-26 NOTE — PROGRESS NOTES
1500 Took over care of patient. Patient A/O X4,following all commands weaning dopamine per BP allows,(see MAR) 1:1 maintained. 0  visiting at bedside, no changes in assessment noted. 1745 Dopamine DCed, will monitor BP.

## 2020-12-26 NOTE — GROUP NOTE
Group Therapy Note    Date: 12/26/2020    Group Start Time: 6442  Group End Time: 1640  Group Topic: Healthy Living/Wellness    [facility]    Carlos Bell        Group Therapy Note    Attendees: 6/9         Patient's Goal:  To learn about healthy sleep habits. Notes:  Patient participated in group discussion.     Status After Intervention:  Improved    Participation Level: Interactive    Participation Quality: Appropriate and Attentive      Speech:  normal      Thought Process/Content: Logical      Affective Functioning: Congruent      Mood: euthymic      Level of consciousness:  Alert and Attentive      Response to Learning: Able to verbalize current knowledge/experience      Endings: None Reported    Modes of Intervention: Education      Discipline Responsible: [address], [employer]      Signature:  Carlos Bell

## 2020-12-26 NOTE — FLOWSHEET NOTE
AM assessment completed. Patient sitting up in bed at this time eating breakfast. Denies any chest pain. Remains NSR on the monitor. No edema noted. No shortness of breath noted at this time. Lungs are clear but diminished bilaterally. SATS 100% on RA. She is A/Ox3 and is up independently in the room. Denies any pain with elimination. Skin remains warm, dry and intact. Right upper arm double lumen PICC, flushed and patent. #20g SL to right lower forearm, flushed and patent. Vital signs stable and AM medications provided. LPN remains at bedside for constant observation/suicide precautions.  Electronically signed by Breanna Hooper RN on 12/26/2020 at 11:42 AM

## 2020-12-26 NOTE — FLOWSHEET NOTE
Patient taken up to [facility] via wheelchair. Accompanied by 3 [facility] LPN and security.  Electronically signed by Caremn Aguila RN on 12/26/2020 at 4:08 PM

## 2020-12-26 NOTE — PROGRESS NOTES
Report taken from 3 [facility]. Pt is a 26year old female that presented to the hospital on 12/23/20 for treatment of intentional overdose of 40 tabs of Tylenol, Zanaflex, and Klonopin. Pt medically cleared and admitted involuntarily to [facility] under the care of Dr. Duke Mao with diagnosis of Major Depression.

## 2020-12-26 NOTE — FLOWSHEET NOTE
1951: Handoff of care given to Valente jimenez RN on 1W.  Electronically signed by Ryan Colin RN on 12/25/2020 at 7:51 PM

## 2020-12-27 LAB
ALBUMIN SERPL-MCNC: 3.6 G/DL (ref 3.5–4.6)
ALP BLD-CCNC: 90 U/L (ref 40–130)
ALT SERPL-CCNC: 36 U/L (ref 0–33)
ANION GAP SERPL CALCULATED.3IONS-SCNC: 8 MEQ/L (ref 9–15)
AST SERPL-CCNC: 21 U/L (ref 0–35)
BILIRUB SERPL-MCNC: 0.3 MG/DL (ref 0.2–0.7)
BUN BLDV-MCNC: 9 MG/DL (ref 6–20)
CALCIUM SERPL-MCNC: 8.9 MG/DL (ref 8.5–9.9)
CHLORIDE BLD-SCNC: 109 MEQ/L (ref 95–107)
CHOLESTEROL, TOTAL: 145 MG/DL (ref 0–199)
CO2: 27 MEQ/L (ref 20–31)
CREAT SERPL-MCNC: 0.66 MG/DL (ref 0.5–0.9)
GFR AFRICAN AMERICAN: >60
GFR NON-AFRICAN AMERICAN: >60
GLOBULIN: 2.8 G/DL (ref 2.3–3.5)
GLUCOSE BLD-MCNC: 85 MG/DL (ref 70–99)
HDLC SERPL-MCNC: 55 MG/DL (ref 40–59)
LDL CHOLESTEROL CALCULATED: 72 MG/DL (ref 0–129)
POTASSIUM REFLEX MAGNESIUM: 4.1 MEQ/L (ref 3.4–4.9)
SODIUM BLD-SCNC: 144 MEQ/L (ref 135–144)
TOTAL PROTEIN: 6.4 G/DL (ref 6.3–8)
TRIGL SERPL-MCNC: 91 MG/DL (ref 0–150)

## 2020-12-27 PROCEDURE — 6370000000 HC RX 637 (ALT 250 FOR IP): Performed by: INTERNAL MEDICINE

## 2020-12-27 PROCEDURE — 36415 COLL VENOUS BLD VENIPUNCTURE: CPT

## 2020-12-27 PROCEDURE — 1240000000 HC EMOTIONAL WELLNESS R&B

## 2020-12-27 PROCEDURE — 80061 LIPID PANEL: CPT

## 2020-12-27 PROCEDURE — 80053 COMPREHEN METABOLIC PANEL: CPT

## 2020-12-27 PROCEDURE — G0378 HOSPITAL OBSERVATION PER HR: HCPCS

## 2020-12-27 RX ADMIN — MIDODRINE HYDROCHLORIDE 5 MG: 5 TABLET ORAL at 08:15

## 2020-12-27 RX ADMIN — MIDODRINE HYDROCHLORIDE 5 MG: 5 TABLET ORAL at 18:08

## 2020-12-27 RX ADMIN — MIDODRINE HYDROCHLORIDE 5 MG: 5 TABLET ORAL at 12:29

## 2020-12-27 ASSESSMENT — LIFESTYLE VARIABLES: HISTORY_ALCOHOL_USE: NO

## 2020-12-27 NOTE — SUICIDE SAFETY PLAN
SAFETY PLAN    A suicide Safety Plan is a document that supports someone when they are having thoughts of suicide. Warning Signs that indicate a suicidal crisis may be developing: What (situations, thoughts, feelings, body sensations, behaviors, etc.) do you experience that lets you know you are beginning to think about suicide? 1. Memory was impaired   2. Mixing pills with alcohol  3. Complete \"blackout\"    Internal Coping Strategies:  What things can I do (relaxation techniques, hobbies, physical activities, etc.) to take my mind off my problems without contacting another person? 1. Listen to Music  2. Walking  3. Driving around    People and social settings that provide distraction: Who can I call or where can I go to distract me? 1. Name: [employer] Phone: [phone]  2. Name: [employer]  Phone: No Phone   3. Place: Spend time with mayela            4. Place: Spend time with     People whom I can ask for help: Who can I call when I need help - for example, friends, family, clergy, someone else? 1. Name: [employer]        Phone: [phone]  2. Name: [employer]  Phone: No Phone  3. Name: [employer] Phone: unsure    Professionals or [employer] agencies I can contact during a crisis: Who can I call for help - for example, my doctor, my psychiatrist, my psychologist, a mental health provider, a suicide hotline? 1. Clinician Name: None. Phone: None. Clinician Pager or Emergency Contact #: None. 2. Suicide Prevention Lifeline: 5-774-727-TALK (9727)    3. [address] [place] [address] Emergency Services -  for example, [facility] suicide hotline, [address] [place] [address] Avenue: [address]      Emergency Services Address: [place] [address]      Emergency Services Phone: [phone]    Making the environment safe: How can I make my environment (house/apartment/living space) safer? For example, can I remove guns, medications, and other items? 1. Patient will abstain from drinking. 2. Medication has been locked by family members.

## 2020-12-27 NOTE — GROUP NOTE
Group Therapy Note    Date: 12/27/2020    Group Start Time: 1100  Group End Time: 1200  Group Topic: Psychoeducation    [facility]    Ashley President, [employer] PRESTON        Group Therapy Note    Attendees: 7         Patient's Goal:  To participate in group therapy and identify one new goal.     Notes:  Patient identified her goal was to learn to control her anger and temper, especially in front of her children. Patient plans on going to [facility] for outside services. Patient believes her children could benefit from therapy as well.      Status After Intervention:  Improved    Participation Level: Interactive    Participation Quality: Sharing      Speech:  normal      Thought Process/Content: Logical      Affective Functioning: Congruent      Mood: tearful      Level of consciousness:  Alert      Response to Learning: Able to verbalize current knowledge/experience      Endings: None Reported    Modes of Intervention: Education      Discipline Responsible: /Counselor      Signature:  Ashley Lux, XANDER PRESTON

## 2020-12-27 NOTE — PROGRESS NOTES
Patient had a flat affect but she was pleasant, talkative and cooperative. Patient denies feeling depressed, denies any stressors or issues at home. She denies being suicidal and minimizes the situation that led to her admission. Patient stated she does not remember taking any pills after the klonopin. She was drinking alcohol and she blacked out. She stated she has restless legs so she took klonopin. She stated she only drinks alcohol every other weekend. She smokes marijuana occasionally. She denies any auditory or visual hallucinations. She denies the need to be here. She has a good appetite and she sleeps well. She enjoys listening to music, playing with her kids and playing with her cat.  Electronically signed by Roger Rendon, [address] on 12/27/2020 at 8:32 AM

## 2020-12-27 NOTE — CARE COORDINATION
FAMILY COLLATERAL NOTE    Family/Support Name: iJmena Melchor  Contact #:  [phone]  Relationship to Pt:     Placed call to above. Response:   called number provided by patient. When answered, there was a message stating the recipient was not accepting calls at this time. As a result, no collateral was completed.          ANUPAMA Otto

## 2020-12-27 NOTE — PROGRESS NOTES
Pt. attended the 0900 community meeting. Electronically signed by Bre Granados, [address] on 12/27/2020 at 9:50 AM

## 2020-12-27 NOTE — CARE COORDINATION
Brief Intervention and Referral to Treatment Summary    Patient was provided PHQ-9, AUDIT and DAST Screening:      PHQ-9 Score: 0  AUDIT Score: 0   DAST Score: 0     Patients substance use is considered     Low Risk/Healthy x  Moderate Risk  Harmful  Dependent    Patients depression is considered:     Minimal x  Mild   Moderate  Moderately Severe  Severe    Brief Education Was Provided N/A    Patient was receptive  Patient was not receptive      Brief Intervention Is Provided (Only for AUDIT or DAST) N/A    Patient reports readiness to decrease and/or stop use and a plan was discussed   Patient denies readiness to decrease and/or stop use and a plan was not discussed      Recommendations/Referrals for Brief and/or Specialized Treatment Provided to Patient   Patient denied any problems with drugs and/or alcohol. She was forthcoming about mixing prescription drugs and alcohol prior to this admission. Patient stated she was not attempting self harm. She described what happened to her as an \"accidental overdose\". As a result, no brief education or intervention was completed.     Electronically signed by Bennett Hansen on 12/27/2020 at 9:49 AM

## 2020-12-27 NOTE — GROUP NOTE
Group Therapy Note    Date: 12/27/2020    Group Start Time: 8120  Group End Time: 4718  Group Topic: Healthy Living/Wellness    [facility] 3W FLYNN Jerez        Group Therapy Note    Attendees: 7/11         Patient's Goal:  To learn about nutrition and healthy eating. Notes:  Patient participated in group discussion.     Status After Intervention:  Improved    Participation Level: Interactive    Participation Quality: Appropriate and Attentive      Speech:  normal      Thought Process/Content: Logical      Affective Functioning: Congruent      Mood: euthymic      Level of consciousness:  Alert and Attentive      Response to Learning: Able to verbalize current knowledge/experience      Endings: None Reported    Modes of Intervention: Education      Discipline Responsible: [address], [facility]      Signature:  Nico Jerez

## 2020-12-27 NOTE — GROUP NOTE
Group Therapy Note    Date: 12/26/2020    Group Start Time: 2105  Group End Time: 2125  Group Topic: Wrap-Up    [facility]    Kristen Plasencia        Group Therapy Note    Attendees: 6/10         Patient's Goal:  \"get here and get comfortable with being here\"    Notes:  Patient reported meeting their goal for the day. Patient shared how scared she was to get up here, but realized \"it's not so bad. \" Patient chose to participate in a deep breathing exercise following wrap up group.     Status After Intervention:  Unchanged    Participation Level: Interactive    Participation Quality: Appropriate, Attentive and Sharing      Speech:  normal      Thought Process/Content: Logical      Affective Functioning: Congruent      Mood: euthymic      Level of consciousness:  Alert and Attentive      Response to Learning: Progressing to goal      Endings: None Reported    Modes of Intervention: Support      Discipline Responsible: [employer]      Signature:  Kristen Plasencia

## 2020-12-27 NOTE — GROUP NOTE
Group Therapy Note    Date: 12/27/2020    Group Start Time: 1000  Group End Time: 1050  Group Topic: Psychoeducation    [facility]    Heidi Sheppard        Group Therapy Note    Attendees: 7         Patient's Goal:  \"Stay positive\"    Notes:  Patient was attentive, sociable and work fairly well on her project in group. Status After Intervention:  Unchanged    Participation Level:  Active Listener    Participation Quality: Appropriate      Speech:  normal      Thought Process/Content: Linear      Affective Functioning: Congruent      Mood: calm      Level of consciousness:  Alert and Attentive      Response to Learning: Able to verbalize current knowledge/experience      Endings: None Reported    Modes of Intervention: Education, Socialization and Activity      Discipline Responsible: Psychoeducational Specialist      Signature:  Heidi Sheppard

## 2020-12-27 NOTE — PROGRESS NOTES
Pt cooperative with assessment, bright in appearance. She minimizes the events that led to her admission. She denies depression and any suicide attempts. She states that she doesn't remember taking any pills after the klonopin. She explains that she was drinking and has restless legs so she took klonopin. She blacked out and when she awoke, she was in the hospital. She has a good support system and she denies any suicidal ideations, homicidal ideation, and hallucinations. Her sleep and appetite are good. She is looking forward to discharge.

## 2020-12-27 NOTE — CARE COORDINATION
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 She is  and lives with her . Patient receives medicaid for health insurance needs. Psychosocial High Risk Factors (check all that apply)    Unable to obtain meds   Chronic illness/pain    Substance abuse   Lack of Family Support   Financial stress   Isolation   Inadequate Community Resources  Suicide attempt(s)  Not taking medications   Victim of crime   Developmental Delay  Unable to manage personal needs    Age 72 or older   Homeless  No transportation   Readmission within 30 days  Unemployment  Traumatic Event    Comments: Patient did not indicate any high risk factors associated with this admission. Psychiatric Advanced Directives: None Reported. Family to Involve in Treatment: Patient provided her 's contact information to complete collateral.     Sexual Orientation:  Patient is currently in a heterosexual relationship. Patient Strengths: Patient was cooperative and engaging. Patient Barriers: None Identified. Opiate Education Provided:  N/A    CMHC/mental health history: None Reported.     Plan of Care   medication management, group/individual therapies, family meetings, psycho -education, treatment team meetings to assist with stabilization    Initial Discharge Plan:  Patient will return home and follow the recommendations of the treatment team.       Clinical Summary: Patient is a 26year old female who was admitted to the [facility] due to a prescription drug overdose. Reportedly, patient took 84 pills. She was found by her mother and 44 was called. When interviewed, patient denied what she did was a self harm attempt. She stated she was drinking, took the pills, and then \"blacked out\". Patient stated she has never had a mental health intervention or been hospitalized for a self-harm attempt. She said she is going to benefit from the program and go to all the groups. Patient described her life as great. She has a supportive  and extended family. When asked about community mental health support, patient stated she talks to her  and mother when she is having difficulties. Her coping skills include listening music, walking, spending time with family and her pets.     Electronically signed by Adam Sahu on 12/27/2020 at 10:05 AM

## 2020-12-27 NOTE — PROGRESS NOTES
Daily Note: 8416-0257    Pt denies SI, HI, and A/V hallucinations. Pt reports good mood and denies any history of depression. Pt voiced that her overdose was accidental and occurred d/t drinking and taking klonopin to help relieve her restless legs. Pt noted to be bright in appearance and social with peers. Pt reports good sleep and appetite. Pt attended groups.

## 2020-12-27 NOTE — H&P
Department of Psychiatry  TELE PSYCHIATRY/ VIRTUAL ASSESSMENT  History and Physical - Adult   THE PATIENT WAS SEEN THROUGH TELEPSYCHIATRY, IN A REAL-TIME, AUDIO-VIDEO ENCOUNTER, WITH THE PATIENT IN [place], [place] AND THE PROVIDER IN [place], [place]      CHIEF COMPLAINT:  Overdose on medications, ?suicide attempt    History obtained from:  patient, electronic medical record    Patient was seen after discussing with the treatment team and reviewing the chart    CIRCUMSTANCES OF ADMISSION:   Ms. Micheline Win is a 26 y.o. female with a history of chemical dependency issues (opioid and cocaine) who was admitted to the hospital for an overdose on multiple medications, including Xanaflex (40 tablets), Tylenol (40 tablets) and clonazepam (8x2 mg tablets). She reportedly said in the ER (according to ER notes) that she had felt overwhelmed, and was drinking, and took the tablets. She did deny in the ER that this was a suicide attempt. HISTORY OF PRESENT ILLNESS:      The patient is a 26 y.o. female with significant past history of opioid and probably cocaine use, who was admitted initially on a medical floor for an overdose on multiple medications in relatively large dose. After medical clearance, she was admitted to . When interviewed today, the patient said she had 'a couple of drinks', and then, since she has Restless Legs Syndrome, she took 2 tablets of clonazepam (from her [place] [address] [facility] [place] and \"[place]ed out\". She said she does not remember taking the rest of the pills. She denied feeling depressed or having felt depressed prior to admission. She denied having any history of depression or other mental health problems. She said she was at her 's mother's house, since she is taking care of his grandmother, and that she had drank alcohol and then had taken \"a couple\" of tablets of clonazepam because she has restless legs and she wanted to sleep. She said she does not recall taking the rest of the tablets. She said she was found by her mother in law, who then called her , and he in his turn called the EMS. She denied that this had been a suicide attempt. She said her mood had been \"good\". She said she is scared of death, and that she does not want to die. She denied any recent significant stressors. She said her sleep was \"great\", and that her appetite was good; \"I can't stop eating\". She denied having been depressed, anxious or irritable prior to admission. She denied previous suicidal ideation or suicide attempts. She denied homicidal ideation. She denied having hallucinations, paranoia or other delusions. Stressors: denies    The patient is not currently receiving care for the above psychiatric illness. Medications Prior to Admission:   No medications prior to admission.     Compliance: n/a    Psychiatric Review of Systems       Depression: denies     Cammie or Hypomania:  no     Panic Attacks:  no     Phobias:  no     Obsessions and Compulsions:  no     PTSD : denies     Hallucinations:  no     Delusions:  no    Substance Abuse History: ETOH: yes (although even though she stated she had been drinking alcohol prior to the overdose, her BAL in the ER was non-detectable). She said she drinks \"occasionally, a couple of times/month\" (she had made though at some point the statement to a staff member while she was on the medical floor that she is an 'alcoholic' according to [address])  Marijuana: yes, \"occasionally\"  Opiates: yes, \"in the past\"; states she had accidentally overdosed on heroin 7 years ago and went for treatment at [facility]" and then stayed sober  Other Drugs: denies; however her tox screen was positive for cocaine both this time and in 2018 according to Epic; I reviewed the records briefly and it seems she was pregnant at the time      Past Psychiatric History:  Prior Diagnosis:  Substance disorders:  Opioid use disorder, ? Cocaine use disorder  Psychiatrist: no  Therapist:no  Hospitalization: no, denies  Hx of Suicidal Attempts: no, denies but she did confirm an \"accidental\" overdose on heroin 7 years ago  Hx of violence:  no  ECT: no  Previous discontinued Psychiatric Med Trials: n/a    Past Medical History:    History reviewed. No pertinent past medical history. Past Surgical History:        Procedure Laterality Date    APPENDECTOMY         Allergies:   No known allergies    Family History  No family history on file. Social History:  Born and Raised: She said she was born in [place] and raised in [facility]. . She was raised by her grandparents. Her parents  when she was young. She has an 11th grade education. She said she wants to get her GED. She worked in home health care, and nursing facilities. She is currently taking care of her 's grandmother. She said she has been  for one year. She has 2 children, a son who is 7 yo (lives with his father), and a daughter who is 3 yo (who is currently with her grandparents). She said she lives with her  and her daughter.     Describes Childhood:   loving SUICIDE RISK ASSESSMENT: moderate to high (possibly due to impulsivity and limited insight into dangerousness of chemical dependency issues)  HOMICIDE: low  AGITATION/VIOLENCE: low  ELOPEMENT: high    LABS: REVIEWED TODAY:  Recent Labs     12/25/20  0600   WBC 8.1   HGB 13.6        Recent Labs     12/25/20  0600 12/25/20  1715 12/27/20  0654    136 144   K 3.6 3.6 4.1    104 109*   CO2 26 24 27   BUN 7 8 9   CREATININE 0.54 0.65 0.66   GLUCOSE 102* 94 85     Recent Labs     12/25/20  0600 12/25/20  1715 12/27/20  0654   BILITOT <0.2 <0.2 0.3   ALKPHOS 110 104 90   AST 37* 25 21   ALT 64* 48* 36*     Lab Results   Component Value Date    LABAMPH Neg 12/23/2020    BARBSCNU Neg 12/23/2020    LABBENZ POSITIVE 12/23/2020    LABMETH Neg 12/23/2020    OPIATESCREENURINE Neg 12/23/2020    PHENCYCLIDINESCREENURINE Neg 12/23/2020    ETOH <10 12/23/2020     Lab Results   Component Value Date    TSH 2.550 12/23/2020     No results found for: LITHIUM  No results found for: VALPROATE, CBMZ  No results found for: LITHIUM, VALPROATE    FURTHER LABS ORDERED :      Radiology   Ir Picc Wo Sq Port/pump > 5 Years    Result Date: 12/25/2020 IR PICC WO SQ PORT/PUMP > 5 YEARS: 12/24/2020 CLINICAL HISTORY:  IV medications or septic shock; including pressors . PROCEDURE: After discussing the procedure and possible complications with the patient, informed consent was obtained. The patient was placed on the Special Procedures table. Central venous catheter was inserted using a maximal sterile barrier technique which includes cap, mask, sterile gown, sterile gloves, sterile full-body drape, hand hygiene and 2% chlorhexidine for cutaneous antisepsis. A sterile ultrasound technique with sterile gel and sterile probe covers was also utilized. The right upper extremity was sterilely prepared using 2% chlorhexidine and then draped with sterile towels and a body-sized sterile drape. All personnel in the Special Procedures Room donned caps and surgical masks. In addition, the  and first assistant donned sterile gowns and gloves after proper hand cleansing. A pre-procedure time out was performed in order to assure the correct patient and procedure. Local anesthetic was administered. A peripheral vein was accessed with sonographic guidance. A sonographic spot image was obtained for documentation. A guidewire was advanced into the vein with fluoroscopic guidance and a sheath was placed over the guidewire. A 5-Kyrgyz 40 cm dual lumen PICC was advanced through the sheath, up the arm and into the central vasculature. It was positioned appropriately. The sheath was removed. The catheter was shown to aspirate and infuse properly. The flange of the catheter was affixed to the arm using a PICC securement device. A spot image of the chest showed the tip of the PICC line to lie in the cavoatrial junction. The patient tolerated the procedure well and without complications. SUCCESSFUL PICC PLACEMENT WITHOUT IMMEDIATE COMPLICATIONS. 2.4 mGy of fluoroscopy was used, with 1 fluoroscopic still saved. No diagnostic images were obtained. EKG: TRACING REVIEWED    TREATMENT PLAN:    Risk Management:  close watch and suicide risk    Collateral Information:  Will obtain collateral information from the family or friends. Will obtain medical records as appropriate from out patient providers  Will consult the hospitalist for a physical exam to rule out any co-morbid physical condition. Home medication Reconciled       New Medications started during this admission :    Patient declined any medications at this time, and denied any symptoms of mental disorders. Will continue to monitor. Prn Haldol 5mg and Vistaril 50mg q6hr for extreme agitation. Trazodone as ordered for insomnia  Vistaril as ordered for anxiety  Patient seems to be minimizing her overdose at this time, and possibly her chemical dependency issues, which makes her to be a higher risk for overdosing again, whether intentional or not. She is currently guarded about her issues, and minimizing any stressors or mental health issues. Psychotherapy:   Encourage participation in milieu and group therapy  Individual therapy as needed        Behavioral Services  Medicare Certification      Admission Day 1  I certify that this patient's inpatient psychiatric hospital admission is medically necessary for:     (1) treatment which could reasonably be expected to improve this patient's condition, or     (2) diagnostic study or its equivalent.        Electronically signed by Sonya Maldonado MD on 12/27/2020 at 11:11 AM

## 2020-12-28 PROBLEM — F19.94 SUBSTANCE INDUCED MOOD DISORDER (HCC): Status: ACTIVE | Noted: 2020-12-28

## 2020-12-28 PROBLEM — Z78.9 ADMITTED TO LABOR AND DELIVERY: Status: RESOLVED | Noted: 2018-11-01 | Resolved: 2020-12-28

## 2020-12-28 PROBLEM — F14.10 COCAINE ABUSE (HCC): Status: ACTIVE | Noted: 2020-12-28

## 2020-12-28 LAB
BLOOD CULTURE, ROUTINE: NORMAL
CULTURE, BLOOD 2: NORMAL

## 2020-12-28 PROCEDURE — G0378 HOSPITAL OBSERVATION PER HR: HCPCS

## 2020-12-28 PROCEDURE — 1240000000 HC EMOTIONAL WELLNESS R&B

## 2020-12-28 PROCEDURE — 90833 PSYTX W PT W E/M 30 MIN: CPT | Performed by: PSYCHIATRY & NEUROLOGY

## 2020-12-28 PROCEDURE — 6370000000 HC RX 637 (ALT 250 FOR IP): Performed by: INTERNAL MEDICINE

## 2020-12-28 PROCEDURE — 99232 SBSQ HOSP IP/OBS MODERATE 35: CPT | Performed by: PSYCHIATRY & NEUROLOGY

## 2020-12-28 RX ADMIN — MIDODRINE HYDROCHLORIDE 5 MG: 5 TABLET ORAL at 08:49

## 2020-12-28 RX ADMIN — MIDODRINE HYDROCHLORIDE 5 MG: 5 TABLET ORAL at 16:58

## 2020-12-28 RX ADMIN — MIDODRINE HYDROCHLORIDE 5 MG: 5 TABLET ORAL at 12:32

## 2020-12-28 NOTE — GROUP NOTE
Group Therapy Note    Date: 12/28/2020    Group Start Time: 1100  Group End Time: 4271  Group Topic: Psychotherapy    [facility]    Patricia Caba 54        Group Therapy Note    Attendees: 9         Patient's Goal:  To go back home    Notes:  Patient stated that she is doing much better and learned a lot while she is here      Status After Intervention:  Improved    Participation Level: Interactive    Participation Quality: Appropriate      Speech:  normal      Thought Process/Content: Logical      Affective Functioning: Congruent      Mood: anxious      Level of consciousness:  Alert      Response to Learning: Progressing to goal      Endings: None Reported    Modes of Intervention: Support      Discipline Responsible: /Counselor      Signature:  Patricia Caba

## 2020-12-28 NOTE — PROGRESS NOTES
Pt. attended the 0900 community meeting.  Electronically signed by Sumanth Gallardo on 12/28/2020 at 1:55 PM

## 2020-12-28 NOTE — PROGRESS NOTES
[facility] FOLLOW-UP NOTE       12/28/2020     Patient was seen and examined in person, Chart reviewed   Patient's case discussed with staff/team    Chief Complaint: SA    Interim History:     Minimize overdose attempt  Pt continue to report that she got blacked out after the 2 klonopin  She denies ever being suicidal  Pt had 2 beers with klonopin for sleeping  Pt does not drink daily  Denies any hopeless or worthless feeling  No AH or VH  No major stressor  No past psych history  Pt took the klonopin from her mother in law prescription and she takes care of her MIL    Appetite:   [x] Normal/Unchanged  [] Increased  [] Decreased      Sleep:       [x] Normal/Unchanged  [] Fair       [] Poor              Energy:    [x] Normal/Unchanged  [] Increased  [] Decreased        SI [] Present  [x] Absent    HI  []Present  [x] Absent     Aggression:  [] yes  [x] no    Patient is [x] able  [] unable to CONTRACT FOR SAFETY     PAST MEDICAL/PSYCHIATRIC HISTORY:   History reviewed. No pertinent past medical history. FAMILY/SOCIAL HISTORY:  No family history on file.   Social History     Socioeconomic History    Marital status: Single     Spouse name: Not on file    Number of children: Not on file    Years of education: Not on file    Highest education level: Not on file   Occupational History    Not on file   Social Needs    Financial resource strain: Not on file    Food insecurity     Worry: Not on file     Inability: Not on file    Transportation needs     Medical: Not on file     Non-medical: Not on file   Tobacco Use    Smoking status: Current Every Day Smoker     Packs/day: 1.00     Years: 15.00     Pack years: 15.00     Types: Cigarettes    Smokeless tobacco: Never Used   Substance and Sexual Activity    Alcohol use: No     Alcohol/week: 12.0 standard drinks     Types: 12 Cans of beer per week     Comment: daily    Drug use: No     Types: Marijuana, IV, Cocaine, Opiates Comment: weed daily    Sexual activity: Yes     Partners: Male   Lifestyle    Physical activity     Days per week: Not on file     Minutes per session: Not on file    Stress: Not on file   Relationships    Social connections     Talks on phone: Not on file     Gets together: Not on file     Attends Sikhism service: Not on file     Active member of club or organization: Not on file     Attends meetings of clubs or organizations: Not on file     Relationship status: Not on file    Intimate partner violence     Fear of current or ex partner: Not on file     Emotionally abused: Not on file     Physically abused: Not on file     Forced sexual activity: Not on file   Other Topics Concern    Not on file   Social History Narrative    Not on file           ROS:  [x] All negative/unchanged except if checked.  Explain positive(checked items) below:  [] Constitutional  [] Eyes  [] Ear/Nose/Mouth/Throat  [] Respiratory  [] CV  [] GI  []   [] Musculoskeletal  [] Skin/Breast  [] Neurological  [] Endocrine  [] Heme/Lymph  [] Allergic/Immunologic    Explanation:     MEDICATIONS:    Current Facility-Administered Medications:     midodrine (PROAMATINE) tablet 5 mg, 5 mg, Oral, TID WC, Yamarcos R Kenji, DO, 5 mg at 12/28/20 0849    nicotine (NICODERM CQ) 21 MG/24HR 1 patch, 1 patch, Transdermal, Daily, Pitney Que, DO, 1 patch at 12/27/20 1229    ondansetron (ZOFRAN) injection 4 mg, 4 mg, Intravenous, Q6H PRN, Pitney Que, DO    acetaminophen (TYLENOL) tablet 650 mg, 650 mg, Oral, Q4H PRN, Marika Garcia MD    magnesium hydroxide (MILK OF MAGNESIA) 400 MG/5ML suspension 30 mL, 30 mL, Oral, Daily PRN, Marika Garcia MD    aluminum & magnesium hydroxide-simethicone (MAALOX) 200-200-20 MG/5ML suspension 30 mL, 30 mL, Oral, PRN, Marika Garcia MD    haloperidol lactate (HALDOL) injection 5 mg, 5 mg, Intramuscular, Q4H PRN **OR** haloperidol (HALDOL) tablet 5 mg, 5 mg, Oral, Q4H PRN, Marika Garcia MD   traZODone (DESYREL) tablet 50 mg, 50 mg, Oral, Nightly PRN, Kellen Fernandez MD    benztropine mesylate (COGENTIN) injection 2 mg, 2 mg, Intramuscular, BID PRN, Kellen Fernandez MD    hydrOXYzine (VISTARIL) capsule 50 mg, 50 mg, Oral, Q6H PRN **OR** hydrOXYzine (VISTARIL) injection 50 mg, 50 mg, Intramuscular, Q6H PRN, Kellen Fernandez MD    influenza quadrivalent split vaccine (FLUZONE;FLUARIX;FLULAVAL;AFLURIA) injection 0.5 mL, 0.5 mL, Intramuscular, Prior to discharge, Kellen Fernandez MD      Examination:  BP (!) 106/54   Pulse 83   Temp 97 °F (36.1 °C) (Oral)   Resp 18   SpO2 100%   Gait - steady  Medication side effects(SE): no    Mental Status Examination:    Level of consciousness:  within normal limits   Appearance:  fair grooming and fair hygiene  Behavior/Motor:  no abnormalities noted  Attitude toward examiner:  cooperative  Speech:  well articulated   Mood: anxious  Affect:  mood congruent  Thought processes:  linear   Thought content:  Suicidal Ideation:  denies suicidal ideation  Delusions:  no evidence of delusions  Perceptual Disturbance:  denies any perceptual disturbance  Cognition:  oriented to person, place, and time   Concentration distractible  Insight fair   Judgement fair     ASSESSMENT:   Patient symptoms are:  [] Well controlled  [x] Improving  [] Worsening  [] No change      Diagnosis:   Principal Problem:    Substance induced mood disorder ([facility]  Active Problems:    Overdose on Tylenol, intentional self-harm, initial encounter ([facility]    Cocaine abuse ([facility]  Resolved Problems:    * No resolved hospital problems. *      LABS:    No results for input(s): WBC, HGB, PLT in the last 72 hours.   Recent Labs     12/25/20  1715 12/27/20  0654    144   K 3.6 4.1    109*   CO2 24 27   BUN 8 9   CREATININE 0.65 0.66   GLUCOSE 94 85     Recent Labs     12/25/20  1715 12/27/20  0654   BILITOT <0.2 0.3   ALKPHOS 104 90   AST 25 21   ALT 48* 36*     Lab Results   Component Value Date [address] Neg 12/23/2020    BARBSCNU Neg 12/23/2020    LABBENZ POSITIVE 12/23/2020    LABMETH Neg 12/23/2020    OPIATESCREENURINE Neg 12/23/2020    PHENCYCLIDINESCREENURINE Neg 12/23/2020    ETOH <10 12/23/2020     Lab Results   Component Value Date    TSH 2.550 12/23/2020     No results found for: LITHIUM  No results found for: VALPROATE, CBMZ      Treatment Plan:  Reviewed current Medications with the patient. No medication started  Risks, benefits, side effects, drug-to-drug interactions and alternatives to treatment were discussed. Collateral information: pending  CD evaluation- denies that she has addiction problem  Encourage patient to attend group and other milieu activities. Discharge planning discussed with the patient and treatment team.    PSYCHOTHERAPY/COUNSELING:  [x] Therapeutic interview  [x] Supportive  [] CBT  [] Ongoing  [] Other  Patient was seen 1:1 for 20 minutes, other than E&M time spent, focusing on      - coping skills techniques     - Anxiety management techniques discussed including deep breathing exercise and PMR     - discussing patients strength and weakness      - Motivational interviewing to assess the stage of change and assessing patient readiness to quit substance use.      - Focusing on negative cognition and maladaptive thoughts, which is feeding and maintaining the depression symptoms      [x] Patient continues to need, on a daily basis, active treatment furnished directly by or requiring the supervision of inpatient psychiatric personnel      Anticipated Length of stay:            Electronically signed by Obie Weathers MD on 12/28/2020 at 10:23 AM

## 2020-12-28 NOTE — GROUP NOTE
Group Therapy Note    Date: 12/28/2020    Group Start Time: 8598  Group End Time: 0233  Group Topic: Cognitive Skills    [facility]    Star Ponto, LISW        Group Therapy Note    Attendees: 9         Patient's Goal: To participate in mood management group. Notes:  Patient presented as very positive and upbeat. She verbalized  being eager to be discharged and practice some of the things she has learned while on the unit. Patient shared her wish to be more positive like her . It is second nature for him and she wants to mirror this in her own life. Status After Intervention:  Improved    Participation Level: Active Listener    Participation Quality: Appropriate and Attentive      Speech:  normal      Thought Process/Content: Logical      Affective Functioning: Congruent      Mood: elevated      Level of consciousness:  Alert      Response to Learning: Able to verbalize current knowledge/experience      Endings: None Reported    Modes of Intervention: Education      Discipline Responsible: /Counselor      Signature:   ANUPAMA Whitehead

## 2020-12-28 NOTE — PROGRESS NOTES
Pt denies SI/HI or AVH. Patient minimizes and has no issues at this time. Patient out social on the unit going to groups. Sleep is good and appetite is good.  Electronically signed by Jigna Howard LPN on 47/99/8187 at 11:14 AM

## 2020-12-28 NOTE — PROGRESS NOTES
Pt is bright, visible on unit and social with peers. Pt minimizes her overdose and is evasive when asked about it. Pt got irritable and asked Emir Rosales you supposed to be asking these questions? \" when asked about suicidal ideations. Pt denies SI, HI, AVH. Pt is going to groups. Pt is showering and well kempt. Pt expresses excitement about being discharged. Pt denies anxiety and depression and states \"I've never had anxiety or depression. \"  Electronically signed by Josephine Ramos RN on 12/28/2020 at 5:27 PM

## 2020-12-28 NOTE — GROUP NOTE
Group Therapy Note    Date: 12/27/2020    Group Start Time: 2100  Group End Time: 2120  Group Topic: Wrap-Up    [facility]    Vitaly Packer        Group Therapy Note    Attendees: 6/12         Patient's Goal:  \"keep staying positive\"    Notes:  Patient reported meeting their goal for today. Patient chose to participate in a meditation exercise following wrap up group.     Status After Intervention:  Unchanged    Participation Level: Interactive    Participation Quality: Appropriate and Attentive      Speech:  normal      Thought Process/Content: Logical      Affective Functioning: Congruent      Mood: euthymic      Level of consciousness:  Alert and Attentive      Response to Learning: Progressing to goal      Endings: None Reported    Modes of Intervention: Support      Discipline Responsible: [employer]      Signature:  Vitaly Packer

## 2020-12-28 NOTE — CARE COORDINATION
FAMILY COLLATERAL NOTE    Family/Support Name:norma  Contact #: [phone]  Relationship to Pt[de-identified]         Family/Support contact aware of hospitalization: yes  Presenting Symptoms/Current Concerns:  PT was an admit from medical floor due to overdose. Pt has a history of AOD issues with past OD on heroin. Pt was positive for marijuana, cocaine upon admission. Denies feelings of depression prior to SA. Denies MH history. Denies this being a SA and that her mood is \"good. \"     Top 3 Life Stressors:   \"me\"   separation of her kids- Son is 8 and with dad. Daughter is 2 and with grandparents. CPS was involved and official custody has been transferred   Unemployed       Background History Relevant to Current Hospitalization: \"No issues in the past month. It was me. I was a real asshole. Everything was breaking down, all my vehicles were eating my money. I spoke quiet harshly and I was mean and I said a lot of harsh shit. I was throwing in the towel. \"     Reports increase in irritability recently. Reports increase in stressors and  had people staying at the home \"to help people out. \"     Pt was at mother in law house and was found by Jacqueline Bonilla after the overdose. \" I dont think she remembered or knew what she was doing. \" \"mom said there was a mess in the pill cupboard. I woke her up and she was coherent but was really groggy. \"     Denies past SI. \"she loves her life. She has a big smile. \"     Reports occasional/recreational use of cocaine, marijuana, and alcohol      seemed to minimize issues     Family Mental Health/Substance Use History:   Denies       Support Network's Goal for Hospitalization: \" I think she might have already achieved it already. To find jose g in her life again. \"    Discharge Plan: discharge home and link with outpatient services       Support Network Supportive of Discharge Plan: in agreement Support can confirm Safety of Location and Security of Weapons: denies       Support agreeable to [employer] and Monitor Medications (including Prescription and OTC): informed him that all medications will need to be locked and pt have no access to them. Reports that he locked up all medications.      Identified Barriers to Compliance with Discharge Plan: substance     Recommendations for Support Network: be available for follow up calls        MARA Arrington

## 2020-12-28 NOTE — GROUP NOTE
Group Therapy Note    Date: 12/27/2020    Group Start Time: 7041  Group End Time: 6627  Group Topic: Recreational    [facility]    Wyatt Jose        Group Therapy Note    Attendees: 7/11         Patient's Goal:  To play game of Heads Up with the group. Notes:  Patient played the game with peers.     Status After Intervention:  Improved    Participation Level: Interactive    Participation Quality: Appropriate and Attentive      Speech:  loud      Thought Process/Content: Logical      Affective Functioning: Congruent      Mood: euthymic      Level of consciousness:  Alert and Attentive      Response to Learning: Progressing to goal      Endings: None Reported    Modes of Intervention: Education      Discipline Responsible: [employer]      Signature:  Wyatt Jose

## 2020-12-28 NOTE — PROGRESS NOTES
Daily Note: 3651-1199    Pt continues to deny SI, HI, and A/V hallucinations. Pt reports good mood and denies any history of depression. Pt noted to be bright in appearance and social with peers. Pt reports good sleep and appetite. Pt attended groups. Pt denies any issues at this time.

## 2020-12-28 NOTE — GROUP NOTE
Group Therapy Note    Date: 12/28/2020    Group Start Time: 1000  Group End Time: 1100  Group Topic: Psychoeducation    [facility]    Luzmaria Ingram, CTRS        Group Therapy Note    Attendees: 9         Patient's Goal:  \"to go to groups\"    Notes:  Pt. attended the 1000 skill group. Easily distracted. Was rushing to quickly finish project but accepted redirection well. Status After Intervention:  Improved    Participation Level:  Active Listener and Interactive    Participation Quality: Appropriate, Attentive and Sharing      Speech:  normal      Thought Process/Content: Logical      Affective Functioning: Congruent      Mood: anxious and calm      Level of consciousness:  Alert, Oriented x4 and Attentive      Response to Learning: Able to change behavior and Progressing to goal      Endings: None Reported    Modes of Intervention: Education, Support, Socialization and Activity      Discipline Responsible: Psychoeducational Specialist      Signature:  Salena Ace

## 2020-12-29 VITALS
HEIGHT: 66 IN | RESPIRATION RATE: 16 BRPM | DIASTOLIC BLOOD PRESSURE: 57 MMHG | TEMPERATURE: 96 F | HEART RATE: 103 BPM | WEIGHT: 155 LBS | OXYGEN SATURATION: 100 % | SYSTOLIC BLOOD PRESSURE: 126 MMHG | BODY MASS INDEX: 24.91 KG/M2

## 2020-12-29 PROBLEM — T39.1X2A INTENTIONAL ACETAMINOPHEN OVERDOSE (HCC): Status: ACTIVE | Noted: 2020-12-29

## 2020-12-29 PROCEDURE — 99239 HOSP IP/OBS DSCHRG MGMT >30: CPT | Performed by: PSYCHIATRY & NEUROLOGY

## 2020-12-29 PROCEDURE — 6370000000 HC RX 637 (ALT 250 FOR IP): Performed by: INTERNAL MEDICINE

## 2020-12-29 PROCEDURE — G0378 HOSPITAL OBSERVATION PER HR: HCPCS

## 2020-12-29 RX ADMIN — MIDODRINE HYDROCHLORIDE 5 MG: 5 TABLET ORAL at 09:42

## 2020-12-29 NOTE — GROUP NOTE
Group Therapy Note    Date: 12/29/2020    Group Start Time: 1100  Group End Time: 1140  Group Topic: Psychotherapy    [facility]    Bryanna Liz, [facility]        Group Therapy Note    Attendees: 6         Patient's Goal: did not state left early    Notes:  Patient left group early due to being discharged.      Status After Intervention:  Improved    Participation Level: Interactive    Participation Quality: Appropriate      Speech:  normal      Thought Process/Content: Logical      Affective Functioning: Congruent      Mood: anxious      Level of consciousness:  Alert      Response to Learning: Progressing to goal      Endings: None Reported    Modes of Intervention: Support      Discipline Responsible: /Counselor      Signature:  Bryanna Liz, [facility]

## 2020-12-29 NOTE — DISCHARGE SUMMARY
DISCHARGE SUMMARY      Patient ID:  Giselle Herrera  [id number]  36 y.o.  1990      Admit date: 12/26/2020    Discharge date and time: 12/29/2020    Admitting Physician: Olya Fragoso MD     Discharge Physician: Dr Ainsley Anne MD    Admission Diagnoses: Intentional acetaminophen overdose, initial encounter [facility]) [T39.1X2A]    Admission Condition: poor    Discharged Condition: stable    Admission Circumstance: The patient is a 26 y.o. female with significant past history of opioid and probably cocaine use, who was admitted initially on a medical floor for an overdose on multiple medications in relatively large dose. After medical clearance, she was admitted to . When interviewed today, the patient said she had 'a couple of drinks', and then, since she has Restless Legs Syndrome, she took 2 tablets of clonazepam (from her mother in [address] [facility]), and \"blacked out\". She said she does not remember taking the rest of the pills. She denied feeling depressed or having felt depressed prior to admission. She denied having any history of depression or other mental health problems. She said she was at her 's mother's house, since she is taking care of his grandmother, and that she had drank alcohol and then had taken \"a couple\" of tablets of clonazepam because she has restless legs and she wanted to sleep. She said she does not recall taking the rest of the tablets. She said she was found by her mother in law, who then called her , and he in his turn called the EMS. She denied that this had been a suicide attempt. She said her mood had been \"good\". She said she is scared of death, and that she does not want to die. She denied any recent significant stressors. She said her sleep was \"great\", and that her appetite was good; \"I can't stop eating\". She denied having been depressed, anxious or irritable prior to admission. She denied previous suicidal ideation or suicide attempts. She denied homicidal ideation. She denied having hallucinations, paranoia or other delusions.           Stressors: denies     The patient is not currently receiving care for the above psychiatric illness.     Medications Prior to Admission:   Prescriptions Prior to Admission   No medications prior to admission.        Compliance: n/a     Psychiatric Review of Systems       Depression: denies     Cammie or Hypomania:  no     Panic Attacks:  no     Phobias:  no     Obsessions and Compulsions:  no     PTSD : denies     Hallucinations:  no     Delusions:  no        PAST MEDICAL/PSYCHIATRIC HISTORY:   Past Medical History:   Diagnosis Date    Hepatitis C 12/25/2014    Polysubstance abuse (HCC)     Hx opiates, cocaine, ETOH    Restless legs syndrome (RLS)        FAMILY/SOCIAL HISTORY:  No family history on file. Social History     Socioeconomic History    Marital status: Single     Spouse name: Not on file    Number of children: Not on file    Years of education: Not on file    Highest education level: Not on file   Occupational History    Not on file   Social Needs    Financial resource strain: Not on file    Food insecurity     Worry: Not on file     Inability: Not on file    Transportation needs     Medical: Not on file     Non-medical: Not on file   Tobacco Use    Smoking status: Current Every Day Smoker     Packs/day: 1.00     Years: 15.00     Pack years: 15.00     Types: Cigarettes    Smokeless tobacco: Never Used   Substance and Sexual Activity    Alcohol use:  No Alcohol/week: 12.0 standard drinks     Types: 12 Cans of beer per week     Comment: daily    Drug use: No     Types: Marijuana, IV, Cocaine, Opiates      Comment: weed daily    Sexual activity: Yes     Partners: Male   Lifestyle    Physical activity     Days per week: Not on file     Minutes per session: Not on file    Stress: Not on file   Relationships    Social connections     Talks on phone: Not on file     Gets together: Not on file     Attends Evangelical service: Not on file     Active member of club or organization: Not on file     Attends meetings of clubs or organizations: Not on file     Relationship status: Not on file    Intimate partner violence     Fear of current or ex partner: Not on file     Emotionally abused: Not on file     Physically abused: Not on file     Forced sexual activity: Not on file   Other Topics Concern    Not on file   Social History Narrative    Not on file       MEDICATIONS:    Current Facility-Administered Medications:     midodrine (PROAMATINE) tablet 5 mg, 5 mg, Oral, TID WC, Pitjeff Grey, DO, 5 mg at 12/28/20 1658    nicotine (NICODERM CQ) 21 MG/24HR 1 patch, 1 patch, Transdermal, Daily, Sherman Grey, DO, 1 patch at 12/27/20 1229    ondansetron (ZOFRAN) injection 4 mg, 4 mg, Intravenous, Q6H PRN, Sherman Grey DO    acetaminophen (TYLENOL) tablet 650 mg, 650 mg, Oral, Q4H PRN, Willy Ornelas MD    magnesium hydroxide (MILK OF MAGNESIA) 400 MG/5ML suspension 30 mL, 30 mL, Oral, Daily PRN, Willy Ornelas MD    aluminum & magnesium hydroxide-simethicone (MAALOX) 200-200-20 MG/5ML suspension 30 mL, 30 mL, Oral, PRN, Willy Ornelas MD    haloperidol lactate (HALDOL) injection 5 mg, 5 mg, Intramuscular, Q4H PRN **OR** haloperidol (HALDOL) tablet 5 mg, 5 mg, Oral, Q4H PRN, Willy Ornelas MD    traZODone (DESYREL) tablet 50 mg, 50 mg, Oral, Nightly PRN, Willy Ornelas MD   benztropine mesylate (COGENTIN) injection 2 mg, 2 mg, Intramuscular, BID PRN, Abe Camacho MD    hydrOXYzine (VISTARIL) capsule 50 mg, 50 mg, Oral, Q6H PRN **OR** hydrOXYzine (VISTARIL) injection 50 mg, 50 mg, Intramuscular, Q6H PRN, Abe Camacho MD    influenza quadrivalent split vaccine (FLUZONE;FLUARIX;FLULAVAL;AFLURIA) injection 0.5 mL, 0.5 mL, Intramuscular, Prior to discharge, Abe Camacho MD    Examination:  BP (!) 126/57   Pulse 103   Temp 96 °F (35.6 °C) (Oral)   Resp 16   Ht 5' 6\" (1.676 m)   Wt 155 lb (70.3 kg)   SpO2 100%   BMI 25.02 kg/m²   Gait - steady    HOSPITAL COURSE[de-identified]  Following admission to the hospital, patient had a complete physical exam and blood work up  Patient was monitored closely with suicide precaution  Patient was started on medication as listed below  Was encouraged to participate in group and other milieu activity  Patient started to feel better with this combination of treatment. Significant progress in the symptoms since admission. Mood better, with the score of 2/10 - bad  No AVH or paranoid thoughts  No Hopeless or worthless feeling  No active SI/HI  Appetite:  [x] Normal  [] Increased  [] Decreased    Sleep:       [x] Normal  [] Fair       [] Poor            Energy:    [x] Normal  [] Increased  [] Decreased     SI [] Present  [x] Absent  HI  []Present  [x] Absent   Aggression:  [] yes  [] no  Patient is [x] able  [] unable to CONTRACT FOR SAFETY   Medication side effects(SE):  [x] None(Psych.  Meds.) [] Other      Mental Status Examination on discharge:    Level of consciousness:  within normal limits   Appearance:  well-appearing  Behavior/Motor:  no abnormalities noted  Attitude toward examiner:  attentive and good eye contact  Speech:  spontaneous, normal rate and normal volume   Mood: euthymic  Affect:  mood congruent  Thought processes:  goal directed   Thought content:  Suicidal Ideation:  denies suicidal ideation Delusions:  no evidence of delusions  Perceptual Disturbance:  denies any perceptual disturbance  Cognition:  oriented to person, place, and time   Concentration intact  Memory intact  Insight good   Judgement fair   Fund of Knowledge adequate      ASSESSMENT:  Patient symptoms are:  [x] Well controlled  [x] Improving  [] Worsening  [] No change      Diagnosis:  Principal Problem:    Substance induced mood disorder ([facility]  Active Problems:    Overdose on Tylenol, intentional self-harm, initial encounter ([facility]    Cocaine abuse ([facility]    Intentional acetaminophen overdose ([facility]  Resolved Problems:    * No resolved hospital problems. *      LABS:    No results for input(s): WBC, HGB, PLT in the last 72 hours. Recent Labs     12/27/20  0654      K 4.1   *   CO2 27   BUN 9   CREATININE 0.66   GLUCOSE 85     Recent Labs     12/27/20  0654   BILITOT 0.3   ALKPHOS 90   AST 21   ALT 36*     Lab Results   Component Value Date    LABAMPH Neg 12/23/2020    BARBSCNU Neg 12/23/2020    LABBENZ POSITIVE 12/23/2020    LABMETH Neg 12/23/2020    OPIATESCREENURINE Neg 12/23/2020    PHENCYCLIDINESCREENURINE Neg 12/23/2020    ETOH <10 12/23/2020     Lab Results   Component Value Date    TSH 2.550 12/23/2020     No results found for: LITHIUM  No results found for: VALPROATE, CBMZ    RISK ASSESSMENT AT DISCHARGE: Low risk for suicide and homicide. Treatment Plan:  Reviewed current Medications with the patient. Education provided on the complaince with treatment. Risks, benefits, side effects, drug-to-drug interactions and alternatives to treatment were discussed. Encourage patient to attend outpatient follow up appointment and therapy. Patient was advised to call the outpatient provider, visit the nearest ED or call 911 if symptoms are not manageable. Patient's family member was contacted prior to the discharge. Medication List      You have not been prescribed any medications. Reason for more than one antipsychotic:   [x] N/A  [] 3 failed monotherapy(drugs tried):  [] Cross over to a new antipsychotic  [] Taper to monotherapy from polypharmacy  [] Augmentation of Clozapine therapy due to treatment resistance to single therapy        TIME SPEND - 35 MINUTES TO COMPLETE THE EVALUATION, DISCHARGE SUMMARY, MEDICATION RECONCILIATION AND FOLLOW UP CARE     SignedSasha Render  12/29/2020  8:51 AM

## 2020-12-29 NOTE — GROUP NOTE
Group Therapy Note    Date: 12/28/2020    Group Start Time: 2100  Group End Time: 2120  Group Topic: Wrap-Up    [facility]    Minnie Ross        Group Therapy Note    Attendees: 9/13         Patient's Goal:  \"get on the same page with my  about starting a routine\"    Notes:  Patient reported meeting their goal for today. Patient chose to participate in a guided meditation following wrap up group.     Status After Intervention:  Unchanged    Participation Level: Interactive    Participation Quality: Appropriate, Attentive and Sharing      Speech:  normal      Thought Process/Content: Logical      Affective Functioning: Congruent      Mood: euthymic      Level of consciousness:  Alert and Attentive      Response to Learning: Progressing to goal      Endings: None Reported    Modes of Intervention: Support      Discipline Responsible: [employer]      Signature:  Minnie Ross

## 2020-12-29 NOTE — DISCHARGE INSTR - DIET
? Good nutrition is important when healing from an illness, injury, or surgery. Follow any nutrition recommendations given to you during your hospital stay. ? If you were given an oral nutrition supplement while in the hospital, continue to take this supplement at home. You can take it with meals, in-between meals, and/or before bedtime. These supplements can be purchased at most local grocery stores, pharmacies, and chain [employer]-stores. ? If you have any questions about your diet or nutrition, call the hospital and ask for the dietitian.   As tolerated

## 2020-12-29 NOTE — PROGRESS NOTES
Pt. attended the 0900 community meeting. Electronically signed by Luigi Fernandez Old Clarisse Shaw on 12/29/2020 at 9:50 AM

## 2020-12-29 NOTE — GROUP NOTE
Group Therapy Note    Date: 12/28/2020    Group Start Time: 8913  Group End Time: 1900  Group Topic: Recreational    [facility]    Carlos Bell        Group Therapy Note    Attendees: 7/13         Patient's Goal:  To play Wii [employer] [place] [employer] with the group. Notes:  Patient played the game with peers.     Status After Intervention:  Improved    Participation Level: Interactive    Participation Quality: Appropriate and Attentive      Speech:  normal      Thought Process/Content: Logical      Affective Functioning: Congruent      Mood: euthymic      Level of consciousness:  Alert and Attentive      Response to Learning: Progressing to goal      Endings: None Reported    Modes of Intervention: Activity      Discipline Responsible: [employer]      Signature:  Carlos Bell

## 2020-12-29 NOTE — GROUP NOTE
Group Therapy Note    Date: 12/28/2020    Group Start Time: 4778  Group End Time: 6579  Group Topic: Healthy Living/Wellness    [facility]    Sarthak Vega        Group Therapy Note    Attendees: 9/13         Patient's Goal:  To learn about healthy coping skills. Notes:  Patient shared their favorite healthy way to cope.     Status After Intervention:  Improved    Participation Level: Interactive    Participation Quality: Appropriate, Attentive and Sharing      Speech:  normal      Thought Process/Content: Logical      Affective Functioning: Congruent      Mood: euthymic      Level of consciousness:  Alert and Attentive      Response to Learning: Able to verbalize current knowledge/experience      Endings: None Reported    Modes of Intervention: Education      Discipline Responsible: [address] [facility] [address]      Signature:  Sarthak Vega

## 2020-12-29 NOTE — GROUP NOTE
Group Therapy Note    Date: 12/29/2020    Group Start Time: 1000  Group End Time: 1050  Group Topic: Psychoeducation    [facility]    Fanta Alcaraz        Group Therapy Note    Attendees: 11         Patient's Goal:  \"Stay positive and go home\"    Notes:  Patient had a brighter affect, she was talkative, sociable and overall participation was good in group. Status After Intervention:  Improved    Participation Level:  Active Listener    Participation Quality: Appropriate and Attentive      Speech:  normal      Thought Process/Content: Logical  Linear      Affective Functioning: Congruent      Mood: Improved      Level of consciousness:  Alert, Oriented x4 and Attentive      Response to Learning: Able to verbalize current knowledge/experience      Endings: None Reported    Modes of Intervention: Education, Socialization and Activity      Discipline Responsible: Psychoeducational Specialist      Signature:  Cathleen Diego

## 2021-11-22 NOTE — PROGRESS NOTES
Dr. Shayy Devries in to visit. Ambulated to toilet. Agnes well. Denies dizziness. Vd. Large amt. Alert. Attempting to call . Time Dose Fractionation (Optional- Include Units If Applicable): 99

## 2021-12-22 ENCOUNTER — APPOINTMENT (OUTPATIENT)
Dept: GENERAL RADIOLOGY | Age: 31
End: 2021-12-22
Payer: MEDICAID

## 2021-12-22 ENCOUNTER — HOSPITAL ENCOUNTER (EMERGENCY)
Age: 31
Discharge: HOME | End: 2021-12-22
Payer: MEDICAID

## 2021-12-22 VITALS
DIASTOLIC BLOOD PRESSURE: 61 MMHG | SYSTOLIC BLOOD PRESSURE: 97 MMHG | WEIGHT: 160 LBS | RESPIRATION RATE: 16 BRPM | OXYGEN SATURATION: 97 % | TEMPERATURE: 97.9 F | HEIGHT: 66 IN | BODY MASS INDEX: 25.71 KG/M2 | HEART RATE: 90 BPM

## 2021-12-22 DIAGNOSIS — S82.832A CLOSED FRACTURE OF PROXIMAL END OF LEFT FIBULA, UNSPECIFIED FRACTURE MORPHOLOGY, INITIAL ENCOUNTER: Primary | ICD-10-CM

## 2021-12-22 DIAGNOSIS — S82.892A CLOSED FRACTURE OF LEFT ANKLE, INITIAL ENCOUNTER: ICD-10-CM

## 2021-12-22 PROCEDURE — 6360000002 HC RX W HCPCS

## 2021-12-22 PROCEDURE — 29505 APPLICATION LONG LEG SPLINT: CPT

## 2021-12-22 PROCEDURE — 99284 EMERGENCY DEPT VISIT MOD MDM: CPT

## 2021-12-22 PROCEDURE — 96374 THER/PROPH/DIAG INJ IV PUSH: CPT

## 2021-12-22 PROCEDURE — 73590 X-RAY EXAM OF LOWER LEG: CPT

## 2021-12-22 PROCEDURE — 73610 X-RAY EXAM OF ANKLE: CPT

## 2021-12-22 PROCEDURE — 6370000000 HC RX 637 (ALT 250 FOR IP)

## 2021-12-22 RX ORDER — HYDROCODONE BITARTRATE AND ACETAMINOPHEN 5; 325 MG/1; MG/1
1 TABLET ORAL EVERY 6 HOURS PRN
Qty: 10 TABLET | Refills: 0 | Status: SHIPPED | OUTPATIENT
Start: 2021-12-22 | End: 2021-12-25

## 2021-12-22 RX ORDER — KETOROLAC TROMETHAMINE 30 MG/ML
15 INJECTION, SOLUTION INTRAMUSCULAR; INTRAVENOUS ONCE
Status: DISCONTINUED | OUTPATIENT
Start: 2021-12-22 | End: 2021-12-22 | Stop reason: HOSPADM

## 2021-12-22 RX ORDER — KETOROLAC TROMETHAMINE 30 MG/ML
30 INJECTION, SOLUTION INTRAMUSCULAR; INTRAVENOUS ONCE
Status: DISCONTINUED | OUTPATIENT
Start: 2021-12-22 | End: 2021-12-22

## 2021-12-22 RX ORDER — HYDROCODONE BITARTRATE AND ACETAMINOPHEN 5; 325 MG/1; MG/1
1 TABLET ORAL ONCE
Status: COMPLETED | OUTPATIENT
Start: 2021-12-22 | End: 2021-12-22

## 2021-12-22 RX ORDER — KETOROLAC TROMETHAMINE 15 MG/ML
INJECTION, SOLUTION INTRAMUSCULAR; INTRAVENOUS
Status: COMPLETED
Start: 2021-12-22 | End: 2021-12-22

## 2021-12-22 RX ORDER — KETOROLAC TROMETHAMINE 30 MG/ML
15 INJECTION, SOLUTION INTRAMUSCULAR; INTRAVENOUS ONCE
Status: COMPLETED | OUTPATIENT
Start: 2021-12-22 | End: 2021-12-22

## 2021-12-22 RX ADMIN — HYDROCODONE BITARTRATE AND ACETAMINOPHEN 1 TABLET: 5; 325 TABLET ORAL at 13:06

## 2021-12-22 RX ADMIN — KETOROLAC TROMETHAMINE 15 MG: 15 INJECTION, SOLUTION INTRAMUSCULAR; INTRAVENOUS at 11:55

## 2021-12-22 RX ADMIN — KETOROLAC TROMETHAMINE 15 MG: 30 INJECTION, SOLUTION INTRAMUSCULAR; INTRAVENOUS at 11:56

## 2021-12-22 ASSESSMENT — PAIN DESCRIPTION - LOCATION: LOCATION: ANKLE

## 2021-12-22 ASSESSMENT — PAIN DESCRIPTION - PAIN TYPE: TYPE: ACUTE PAIN

## 2021-12-22 ASSESSMENT — PAIN SCALES - GENERAL
PAINLEVEL_OUTOF10: 8
PAINLEVEL_OUTOF10: 8

## 2021-12-22 ASSESSMENT — PAIN DESCRIPTION - ORIENTATION: ORIENTATION: LEFT

## 2021-12-22 ASSESSMENT — PAIN DESCRIPTION - FREQUENCY: FREQUENCY: CONTINUOUS

## 2021-12-22 NOTE — ED NOTES
Fitted with posterior long leg splint. PMS intact before and after.      Jaycee Martel RN  12/22/21 2762

## 2021-12-22 NOTE — ED NOTES
Fitted with crutches. Use reviewed and demonstrated. Pt awaiting transportation.      Barry Siegel RN  12/22/21 2668

## 2021-12-22 NOTE — ED PROVIDER NOTES
[address] [facility] ED  EMERGENCY DEPARTMENT ENCOUNTER      Pt Name: Taqueria Brambila  MRN: [id number]  [place] 1990  Date of evaluation: 12/22/2021  Provider: Gem Montana       Chief Complaint   Patient presents with    Ankle Pain     left         HISTORY OF PRESENT ILLNESS   (Location/Symptom, Timing/Onset, Context/Setting, Quality, Duration, Modifying Factors, Severity)  Note limiting factors. Taqueria Brambila is a 33 y.o. female who presents to the emergency department by squad for complaint of left ankle pain after she rolled it is getting into bed last night. She has not take anything over-the-counter. Hurts to bear weight. Denies any pain. Denies radiation of pain. She states that she has a previous fracture to this ankle. Denies weakness, temp or sensation changes    HPI    Nursing Notes were reviewed. REVIEW OF SYSTEMS    (2-9 systems for level 4, 10 or more for level 5)     Review of Systems    Except as noted above the remainder of the review of systems was reviewed and negative. PAST MEDICAL HISTORY     Past Medical History:   Diagnosis Date    Hepatitis C 12/25/2014    Polysubstance abuse (HCC)     Hx opiates, cocaine, ETOH    Restless legs syndrome (RLS)          SURGICAL HISTORY       Past Surgical History:   Procedure Laterality Date    APPENDECTOMY           CURRENT MEDICATIONS       Previous Medications    No medications on file       ALLERGIES     No known allergies    FAMILY HISTORY     No family history on file.        SOCIAL HISTORY       Social History     Socioeconomic History    Marital status: Single     Spouse name: Not on file    Number of children: Not on file    Years of education: Not on file    Highest education level: Not on file   Occupational History    Not on file   Tobacco Use    Smoking status: Current Every Day Smoker     Packs/day: 1.00     Years: 15.00     Pack years: 15.00     Types: Cigarettes    Smokeless tobacco: Never Used   Substance and Sexual Activity    Alcohol use: No     Alcohol/week: 12.0 standard drinks     Types: 12 Cans of beer per week     Comment: daily    Drug use: No     Types: Marijuana (Weed), IV, Cocaine, Opiates      Comment: weed daily    Sexual activity: Yes     Partners: Male   Other Topics Concern    Not on file   Social History Narrative    Not on file     Social Determinants of Health     Financial Resource Strain:     Difficulty of Paying Living Expenses: Not on file   Food Insecurity:     Worried About Running Out of Food in the Last Year: Not on file    Kannan of Food in the Last Year: Not on file   Transportation Needs:     Lack of Transportation (Medical): Not on file    Lack of Transportation (Non-Medical):  Not on file   Physical Activity:     Days of Exercise per Week: Not on file    Minutes of Exercise per Session: Not on file   Stress:     Feeling of Stress : Not on file   Social Connections:     Frequency of Communication with Friends and Family: Not on file    Frequency of Social Gatherings with Friends and Family: Not on file    Attends Restorationist Services: Not on file    Active Member of [address] [place] [address] or Organizations: Not on file    Attends Club or Organization Meetings: Not on file    Marital Status: Not on file   Intimate Partner Violence:     Fear of Current or Ex-Partner: Not on file    Emotionally Abused: Not on file    Physically Abused: Not on file    Sexually Abused: Not on file   Housing Stability:     Unable to Pay for Housing in the Last Year: Not on file    Number of [place] in the Last Year: Not on file    Unstable Housing in the Last Year: Not on file       SCREENINGS                               CIWA Assessment  BP: 97/61  Pulse: 90                 PHYSICAL EXAM    (up to 7 for level 4, 8 or more for level 5)     ED Triage Vitals   BP Temp Temp src Pulse Resp SpO2 Height Weight   -- -- -- -- -- -- -- --       Physical Exam     PHYSICAL EXAM    GENERAL: ALERT, NO ACUTE DISTRESS, TALKING IN FULL AND COMPELTE SENTENCES  SKIN: WARM, DRY, NO RASH, INTACT  HEAD: NORMOCEPHALIC, ATRAUMATIC  EYES: EOMI  NECK: SUPPLE, TRACHEA MIDLINE, FROM  MOUTH: ORAL MUCOSA MOIST  RESPIRATORY: NON LABORED RESPIRATIONS, SYMMETRICAL EXPANSION  EXTREMITIES: Soft tissue swelling to left lateral malleolus, tender to left lateral malleolus and tib-fib throughout, no tenderness to metatarsals, NO CYANOSIS, NO EDEMA, FROM, PULSES INTACT, CAPILLARY REFILL INTACT, NO TENDERNESS, STRENGTH +5/5, NO DEFORMITY, SENSORY INTACT  NEURO: A&OX3  PSYCH: COOPERATIVE, APPROPRIATE MOOD AND AFFECT    DIAGNOSTIC RESULTS         RADIOLOGY:   Non-plain film images such as CT, Ultrasound and MRI are read by the radiologist. Plain radiographic images are visualized and preliminarily interpreted by the emergency physician with the below findings:        Interpretation per the Radiologist below, if available at the time of this note:    XR ANKLE LEFT (MIN 3 VIEWS)    (Results Pending)   XR TIBIA FIBULA LEFT (2 VIEWS)    (Results Pending)         ED BEDSIDE ULTRASOUND:   Performed by ED Physician - none    LABS:  Labs Reviewed - No data to display    All other labs were within normal range or not returned as of this dictation. EMERGENCY DEPARTMENT COURSE and DIFFERENTIAL DIAGNOSIS/MDM:   Vitals:    Vitals:    12/22/21 1147   BP: 97/61   Pulse: 90   Resp: 16   Temp: 97.9 °F (36.6 °C)   SpO2: 97%   Weight: 160 lb (72.6 kg)   Height: 5' 6\" (1.676 m)           MDM    Patient be evaluated in x-ray and given IV Toradol as squad placed an IV. X-ray showing a possible avulsion fracture to the left lateral malleolus and a spiral fracture to the left fibula. She is placed in a long-leg OCL and given crutches and is advised to remain nonweightbearing and to call the orthopedic today to schedule a follow appointment. She is given 1 Norco here and provided a prescription. Denies history of drug abuse.   Fracture care initiated in ER. Neurovascular intact before and after splint applied. Afebrile, not tachycardic, non toxic appearing, tolerating PO, ambulating at baseline and hemodynamically stable to be discharged. answered all questions. pt in agreement with tx. educated when to return to ER. FINAL IMPRESSION      1. Closed fracture of proximal end of left fibula, unspecified fracture morphology, initial encounter    2. Closed fracture of left ankle, initial encounter          DISPOSITION/PLAN   DISPOSITION        PATIENT REFERRED TO:  [facility]  [facility]  [address] [address]  [address] [facility] [address]  [phone]  Call today        DISCHARGE MEDICATIONS:  New Prescriptions    HYDROCODONE-ACETAMINOPHEN (NORCO) 5-325 MG PER TABLET    Take 1 tablet by mouth every 6 hours as needed for Pain for up to 3 days. Intended supply: 3 days. Take lowest dose possible to manage pain     Controlled Substances Monitoring:     No flowsheet data found.     (Please note that portions of this note were completed with a voice recognition program.  Efforts were made to edit the dictations but occasionally words are mis-transcribed.)    FABY Kaminski (electronically signed)  Attending Emergency Physician            Milvia Antony Alakassyma  12/22/21 [phone]

## 2021-12-22 NOTE — ED TRIAGE NOTES
While going to get into bed pt rolled left ankle. Very painful and swollen. Pt states had a couple drinks last night too.

## 2021-12-27 ENCOUNTER — OFFICE VISIT (OUTPATIENT)
Dept: ORTHOPEDIC SURGERY | Age: 31
End: 2021-12-27
Payer: MEDICAID

## 2021-12-27 ENCOUNTER — HOSPITAL ENCOUNTER (OUTPATIENT)
Dept: ORTHOPEDIC SURGERY | Age: 31
Discharge: HOME | End: 2021-12-29
Payer: MEDICAID

## 2021-12-27 DIAGNOSIS — S82.892A CLOSED FRACTURE OF LEFT ANKLE, INITIAL ENCOUNTER: ICD-10-CM

## 2021-12-27 DIAGNOSIS — Z76.89 ENCOUNTER TO ESTABLISH CARE: ICD-10-CM

## 2021-12-27 DIAGNOSIS — S82.892A CLOSED FRACTURE OF LEFT ANKLE, INITIAL ENCOUNTER: Primary | ICD-10-CM

## 2021-12-27 PROCEDURE — G8427 DOCREV CUR MEDS BY ELIG CLIN: HCPCS | Performed by: ORTHOPAEDIC SURGERY

## 2021-12-27 PROCEDURE — 99204 OFFICE O/P NEW MOD 45 MIN: CPT | Performed by: ORTHOPAEDIC SURGERY

## 2021-12-27 PROCEDURE — G8484 FLU IMMUNIZE NO ADMIN: HCPCS | Performed by: ORTHOPAEDIC SURGERY

## 2021-12-27 PROCEDURE — 73610 X-RAY EXAM OF ANKLE: CPT

## 2021-12-27 PROCEDURE — 73610 X-RAY EXAM OF ANKLE: CPT | Performed by: ORTHOPAEDIC SURGERY

## 2021-12-27 PROCEDURE — G8419 CALC BMI OUT NRM PARAM NOF/U: HCPCS | Performed by: ORTHOPAEDIC SURGERY

## 2021-12-27 PROCEDURE — 4004F PT TOBACCO SCREEN RCVD TLK: CPT | Performed by: ORTHOPAEDIC SURGERY

## 2021-12-27 PROCEDURE — L4360 PNEUMAT WALKING BOOT PRE CST: HCPCS | Performed by: ORTHOPAEDIC SURGERY

## 2021-12-27 RX ORDER — TRAMADOL HYDROCHLORIDE 50 MG/1
50 TABLET ORAL EVERY 8 HOURS PRN
Qty: 20 TABLET | Refills: 0 | Status: SHIPPED | OUTPATIENT
Start: 2021-12-27 | End: 2022-01-01

## 2021-12-27 NOTE — PROGRESS NOTES
Feeling of Stress : Not on file   Social Connections:     Frequency of Communication with Friends and Family: Not on file    Frequency of Social Gatherings with Friends and Family: Not on file    Attends Worship Services: Not on file    Active Member of Clubs or Organizations: Not on file    Attends Club or Organization Meetings: Not on file    Marital Status: Not on file   Intimate Partner Violence:     Fear of Current or Ex-Partner: Not on file    Emotionally Abused: Not on file    Physically Abused: Not on file    Sexually Abused: Not on file   Housing Stability:     Unable to Pay for Housing in the Last Year: Not on file    Number of [place] in the Last Year: Not on file    Unstable Housing in the Last Year: Not on file     Allergies   Allergen Reactions    No Known Allergies Other (See Comments)     No current outpatient medications on file prior to visit. No current facility-administered medications on file prior to visit. Review of Systems    Objective: There were no vitals taken for this visit. Ortho Exam   Tenderness to palpation over the lateral aspect of the knee as well as swelling but no blistering involving the foot and ankle. She has very limited dorsiflexion secondary to swelling  Radiographs and Laboratory Studies:     Diagnostic Imaging Studies:    Radiographs from the emergency room demonstrate what appears to be large posterior malleolar fragment as well as a nondisplaced proximal fibula fracture. Assessment:       Diagnosis Orders   1. Closed fracture of left ankle, initial encounter  XR ANKLE LEFT (MIN 3 VIEWS)         Plan:      We will treat this as a bimalleolar ankle fracture essentially involving the proximal fibula as well as the posterior malleolus of the tibia, radiographs   In the wound however demonstrates very visible posterior subluxation of the talus under the tibia indicating a large posterior mall fragment that will most likely need an antiglide plate from the posterior aspect. We will get this set up for next Monday with Dr. Parminder Spencer, in the meantime she is to elevate her ankle is much as possible. We will also get a CT scan of this left ankle as well     Orders Placed This Encounter   Procedures    XR ANKLE LEFT (MIN 3 VIEWS)     Standing Status:   Future     Number of Occurrences:   1     Standing Expiration Date:   2022     Scheduling Instructions:      AP, Mortise, Lateral     Order Specific Question:   Reason for exam:     Answer:   post op fracture     No orders of the defined types were placed in this encounter. No follow-ups on file. Clarisse Friedman MD      Surgery Phone: [phone]   [facility]   Surgery Fax: [phone]    Phone: [phone]          Fax: [phone]    Orthopedics: Surgery Scheduling, PAT & PRE-OP Order Form  Call to advance [place] at [phone] at least 24 hours prior to date of service     Surgery Location: [facility] Surgery: [address], [address] [facility]  OFFICE   Surgeon's Maury Mortensen MD Surgery Date: 1/3/2022  Time: TF   Patient's Name: Priyanka Joshi : 1990    SS#:  [id number]  Gender: female  Home Phone:  [phone] Cell Phone: [phone]  Emergency Contact:  Francis Perez   Phone: [phone]  Payor: Nisreen Albright /  /  /    ID No.: [id number]      PROVIDER TO COMPLETE:  Diagnosis: Left posterior malleolus fracture, ankle  Procedure/Consent: Reduction internal fixation left posterior malleolus fracture  CPT CODES:   Case Comments/Implants: Will advise  Surgery Scheduled as: Outpatient  Anesthesia Requested: General  Referring Family Doctor: No primary care provider on file.    PAT  [x] [facility] PAT Date/Time:                                                            [x] History & Physical [] Physician will Provide [] Attached [] Dictated [] Other  [x] Follow Anesthesia Pre-Op Orders for X-rays, [address] [facility]     [x] SN & PT to evaluate and treat/educate disease management, medications, home safety & equipment needs for total joint patients  [] Other: ____________________________________________________  Consults: Medical/Cardiac Clearance done by  ____________________  PRE-OP ORDERS:   Allergies: No known allergies Latex Allergies:             Diabetic:           [] IV ________________________  [x] IV Start with J-loop     Preprinted Orders: Attached [] Yes [] No   ANTIBIOTIC PRE-OP: [x] ANCEF 2 gram IVPB if > 120 kg 3 grams IVPB within 1 hour of incision, if ALLERGIC, use VANCOMYCIN 1 gram IV, 2 hours pre-op  [] TXA Protocol [] Other:   [x] NPO   [] Betablocker (if needed) _____________________________________   [] Knee high anti-embolic hose [] Thigh high anti-embolic hose   Other: ______________________________________________________    Physician Signature Required:  Date/Time: 12/27/2021

## 2021-12-28 ENCOUNTER — HOSPITAL ENCOUNTER (OUTPATIENT)
Dept: CT IMAGING | Age: 31
Discharge: HOME | End: 2021-12-30
Payer: MEDICAID

## 2021-12-28 ENCOUNTER — NURSE ONLY (OUTPATIENT)
Dept: ORTHOPEDIC SURGERY | Age: 31
End: 2021-12-28

## 2021-12-28 DIAGNOSIS — S82.892A CLOSED FRACTURE OF LEFT ANKLE, INITIAL ENCOUNTER: ICD-10-CM

## 2021-12-28 DIAGNOSIS — Z01.818 PRE-OP EXAM: Primary | ICD-10-CM

## 2021-12-28 DIAGNOSIS — Z01.818 PRE-OP EXAM: ICD-10-CM

## 2021-12-28 LAB
ANION GAP SERPL CALCULATED.3IONS-SCNC: 13 MEQ/L (ref 9–15)
BUN BLDV-MCNC: 10 MG/DL (ref 6–20)
CALCIUM SERPL-MCNC: 8.9 MG/DL (ref 8.5–9.9)
CHLORIDE BLD-SCNC: 100 MEQ/L (ref 95–107)
CO2: 24 MEQ/L (ref 20–31)
CREAT SERPL-MCNC: 0.66 MG/DL (ref 0.5–0.9)
GFR AFRICAN AMERICAN: >60
GFR NON-AFRICAN AMERICAN: >60
GLUCOSE BLD-MCNC: 67 MG/DL (ref 70–99)
GONADOTROPIN, CHORIONIC (HCG) QUANT: <0.1 MIU/ML
HCT VFR BLD CALC: 44.7 % (ref 37–47)
HEMOGLOBIN: 14.6 G/DL (ref 12–16)
MCH RBC QN AUTO: 33 PG (ref 27–31.3)
MCHC RBC AUTO-ENTMCNC: 32.7 % (ref 33–37)
MCV RBC AUTO: 100.9 FL (ref 82–100)
PDW BLD-RTO: 13.8 % (ref 11.5–14.5)
PLATELET # BLD: 323 K/UL (ref 130–400)
POTASSIUM SERPL-SCNC: 3.9 MEQ/L (ref 3.4–4.9)
RBC # BLD: 4.43 M/UL (ref 4.2–5.4)
SODIUM BLD-SCNC: 137 MEQ/L (ref 135–144)
WBC # BLD: 9.2 K/UL (ref 4.8–10.8)

## 2021-12-28 PROCEDURE — PREOPEXAM PRE-OP EXAM: Performed by: PHYSICIAN ASSISTANT

## 2021-12-28 PROCEDURE — 73700 CT LOWER EXTREMITY W/O DYE: CPT

## 2021-12-28 NOTE — PROGRESS NOTES
Patient here in office today for COVID testing for PAT, surgery with Dr. Hernandez Guzman on 1/3/2022

## 2021-12-29 ENCOUNTER — OFFICE VISIT (OUTPATIENT)
Dept: FAMILY MEDICINE CLINIC | Age: 31
End: 2021-12-29
Payer: MEDICAID

## 2021-12-29 ENCOUNTER — ANESTHESIA EVENT (OUTPATIENT)
Dept: OPERATING ROOM | Age: 31
End: 2021-12-29
Payer: MEDICAID

## 2021-12-29 VITALS
SYSTOLIC BLOOD PRESSURE: 100 MMHG | BODY MASS INDEX: 27.32 KG/M2 | DIASTOLIC BLOOD PRESSURE: 68 MMHG | OXYGEN SATURATION: 97 % | HEART RATE: 88 BPM | HEIGHT: 66 IN | TEMPERATURE: 96.5 F | WEIGHT: 170 LBS

## 2021-12-29 DIAGNOSIS — Z01.818 PREOP TESTING: Primary | ICD-10-CM

## 2021-12-29 PROCEDURE — G8419 CALC BMI OUT NRM PARAM NOF/U: HCPCS | Performed by: NURSE PRACTITIONER

## 2021-12-29 PROCEDURE — G8484 FLU IMMUNIZE NO ADMIN: HCPCS | Performed by: NURSE PRACTITIONER

## 2021-12-29 PROCEDURE — G8427 DOCREV CUR MEDS BY ELIG CLIN: HCPCS | Performed by: NURSE PRACTITIONER

## 2021-12-29 PROCEDURE — 99243 OFF/OP CNSLTJ NEW/EST LOW 30: CPT | Performed by: NURSE PRACTITIONER

## 2021-12-29 SDOH — ECONOMIC STABILITY: FOOD INSECURITY: WITHIN THE PAST 12 MONTHS, THE FOOD YOU BOUGHT JUST DIDN'T LAST AND YOU DIDN'T HAVE MONEY TO GET MORE.: NEVER TRUE

## 2021-12-29 SDOH — ECONOMIC STABILITY: FOOD INSECURITY: WITHIN THE PAST 12 MONTHS, YOU WORRIED THAT YOUR FOOD WOULD RUN OUT BEFORE YOU GOT MONEY TO BUY MORE.: NEVER TRUE

## 2021-12-29 ASSESSMENT — PATIENT HEALTH QUESTIONNAIRE - PHQ9
1. LITTLE INTEREST OR PLEASURE IN DOING THINGS: 0
SUM OF ALL RESPONSES TO PHQ9 QUESTIONS 1 & 2: 0
SUM OF ALL RESPONSES TO PHQ QUESTIONS 1-9: 0
2. FEELING DOWN, DEPRESSED OR HOPELESS: 0

## 2021-12-29 ASSESSMENT — LIFESTYLE VARIABLES: HOW OFTEN DO YOU HAVE A DRINK CONTAINING ALCOHOL: NEVER

## 2021-12-29 ASSESSMENT — SOCIAL DETERMINANTS OF HEALTH (SDOH): HOW HARD IS IT FOR YOU TO PAY FOR THE VERY BASICS LIKE FOOD, HOUSING, MEDICAL CARE, AND HEATING?: NOT HARD AT ALL

## 2021-12-29 NOTE — PROGRESS NOTES
Preoperative Consultation      Balbir Franks  YOB: 1990    Date of Service:  12/29/2021    Vitals:    12/29/21 1614   BP: 100/68   Pulse: 88   Temp: 96.5 °F (35.8 °C)   SpO2: 97%   Weight: 170 lb (77.1 kg)   Height: 5' 6\" (1.676 m)      Wt Readings from Last 2 Encounters:   12/29/21 170 lb (77.1 kg)   12/22/21 160 lb (72.6 kg)     BP Readings from Last 3 Encounters:   12/29/21 100/68   12/22/21 97/61   12/26/20 (!) 101/41        Chief Complaint   Patient presents with   [facility] Pre-op Exam     Will be having left ankle surgery 1/3/21 with Dr. Nolberto Mortimer. Has no concerns at this time. Allergies   Allergen Reactions    No Known Allergies Other (See Comments)     Outpatient Medications Marked as Taking for the 12/29/21 encounter (Office Visit) with OTTO Flores CNP   Medication Sig Dispense Refill    traMADol (ULTRAM) 50 MG tablet Take 1 tablet by mouth every 8 hours as needed for Pain for up to 5 days. Intended supply: 5 days. Take lowest dose possible to manage pain 20 tablet 0     This patient presents to the office today for a preoperative consultation at the request of surgeon, Dr. Nolberto Mortimer, who plans on performing INTERNAL FIXATION LEFT POSTERIOR MALLEOLUS FRACTURE on January 3 at [facility] [place]. The current problem began 1 week ago, and symptoms have been unchanged with time. Conservative therapy: N/A. Patient denies any CP or SOB. Says she has no GI or URI symptoms. She has not had fever recently. Says she had all preop testing done yesterday. Denies any alcohol use. She is a smoker. No inhalers or lung issues that she is aware of.   She is not vaccinated for Covid 19     Planned anesthesia: General   Known anesthesia problems: None   Bleeding risk: No recent or remote history of abnormal bleeding  Personal or FH of DVT/PE: Yes - patient has no HX but father has blood clots in the past.    Patient objection to receiving blood products: No    Patient Active Problem List   Diagnosis    Narcotic abuse, episodic (HCC)    39 weeks gestation of pregnancy    Overdose on Tylenol, intentional self-harm, initial encounter ([facility]    Abnormal liver enzymes    Cocaine abuse ([facility]    Substance induced mood disorder ([facility]    [facility]       Past Medical History:   Diagnosis Date    Hepatitis C 12/25/2014    Polysubstance abuse (HCC)     Hx opiates, cocaine, ETOH    Restless legs syndrome (RLS)      Past Surgical History:   Procedure Laterality Date    APPENDECTOMY       No family history on file. Social History     Socioeconomic History    Marital status: Single     Spouse name: Not on file    Number of children: Not on file    Years of education: Not on file    Highest education level: Not on file   Occupational History    Not on file   Tobacco Use    Smoking status: Current Every Day Smoker     Packs/day: 1.00     Years: 15.00     Pack years: 15.00     Types: Cigarettes     Start date: 6/5/2005    Smokeless tobacco: Never Used   Substance and Sexual Activity    Alcohol use: No     Alcohol/week: 12.0 standard drinks     Types: 12 Cans of beer per week     Comment: daily    Drug use: No     Types: Marijuana (Sangeeta Balbir), IV, Cocaine, Opiates      Comment: weed daily    Sexual activity: Yes     Partners: Male   Other Topics Concern    Not on file   Social History Narrative    Not on file     Social Determinants of Health     Financial Resource Strain: Low Risk     Difficulty of Paying Living Expenses: Not hard at all   Food Insecurity: No Food Insecurity    Worried About Running Out of Food in the Last Year: Never true    Kannan of Food in the Last Year: Never true   Transportation Needs:     Lack of Transportation (Medical): Not on file    Lack of Transportation (Non-Medical):  Not on file   Physical Activity:     Days of Exercise per Week: Not on file    Minutes of Exercise per Session: Not on file   Stress:     Feeling of Stress : Not on file   Social Connections:     Frequency of Communication with Friends and Family: Not on file    Frequency of Social Gatherings with Friends and Family: Not on file    Attends Mandaeism Services: Not on file    Active Member of Clubs or Organizations: Not on file    Attends Club or Organization Meetings: Not on file    Marital Status: Not on file   Intimate Partner Violence:     Fear of Current or Ex-Partner: Not on file    Emotionally Abused: Not on file    Physically Abused: Not on file    Sexually Abused: Not on file   Housing Stability:     Unable to Pay for Housing in the Last Year: Not on file    Number of [place] in the Last Year: Not on file    Unstable Housing in the Last Year: Not on file       Review of Systems  A comprehensive review of systems was negative except for what was noted in the HPI. Physical Exam   Constitutional: She is oriented to person, place, and time. She appears well-developed and well-nourished. No distress. HENT:   Head: Normocephalic and atraumatic. Mouth/Throat: Uvula is midline, oropharynx is clear and moist and mucous membranes are normal.   Eyes: Conjunctivae and EOM are normal. Pupils are equal, round, and reactive to light. Neck: Trachea normal and normal range of motion. Neck supple. No JVD present. Carotid bruit is not present. No mass and no thyromegaly present. Cardiovascular: Normal rate, regular rhythm, normal heart sounds and intact distal pulses. Exam reveals no gallop and no friction rub. No murmur heard. Pulmonary/Chest: Effort normal and breath sounds normal. No respiratory distress. She has no wheezes. She has no rales. Abdominal: Soft. Normal aorta and bowel sounds are normal. She exhibits no distension and no mass. There is no hepatosplenomegaly. No tenderness. Musculoskeletal: She exhibits no edema and no tenderness. Neurological: She is alert and oriented to person, place, and time. She has normal strength.  No cranial nerve deficit or sensory deficit. Coordination and gait normal.   Skin: Skin is warm and dry. No rash noted. No erythema. Psychiatric: She has a normal mood and affect. Her behavior is normal.     EKG Interpretation:  normal EKG, normal sinus rhythm, N/A. Lab Review: drawn yesterday and reviewed. Assessment:       33 y.o. patient with planned surgery as above. Known risk factors for perioperative complications: None, Tobacco abuse  Current medications which may produce withdrawal symptoms if withheld perioperatively: none      Plan:     1. Preoperative workup as follows: ECG, hemoglobin, hematocrit, electrolytes, creatinine, glucose  2. Change in medication regimen before surgery: Hold all medications on morning of surgery  3. Prophylaxis for cardiac events with perioperative beta-blockers: Not indicated  ACC/AHA indications for pre-operative beta-blocker use:    · Vascular surgery with history of postitive stress test  · Intermediate or high risk surgery with history of CAD   · Intermediate or high risk surgery with multiple clinical predictors of CAD- 2 of the following: history of compensated or prior heart failure, history of cerebrovascular disease, DM, or renal insufficiency    Routine administration of higher-dose, long-acting metoprolol in beta-blockernaïve patients on the day of surgery, and in the absence of dose titration is associated with an overall increase in mortality. Beta-blockers should be started days to weeks prior to surgery and titrated to pulse < 70.  4. Deep vein thrombosis prophylaxis: regimen to be chosen by surgical team  5.  No contraindications to planned surgery

## 2021-12-29 NOTE — PATIENT INSTRUCTIONS
Patient Education        Learning About How to Prepare for Surgery  How can you prepare before surgery? You can do some things that will help you safely prepare for surgery. · Understand exactly what surgery is planned. You should know the risks, benefits, and other options. · Tell your doctors ALL the medicines, vitamins, supplements, and herbal remedies you take. Some of these can increase the risk of bleeding. Or they may interact with anesthesia. · Follow your doctor's instructions about which medicines to take or stop before your surgery. ? You may need to stop taking some medicines a week or more before surgery. ? If you take aspirin or some other blood thinner, be sure to talk to your doctor. · Follow any other instructions your doctor gave you. · If you have an advance directive, let your doctor know, and bring a copy to the hospital.   It may include a living will and a durable power of  for health care. It lets your doctor and loved ones know your health care wishes. If you don't have one, you may want to prepare one. How can you prepare on the day of surgery? Here are some tips about what to do at home before you leave for your surgery. · If your doctor told you to take your medicines on the day of surgery, take them with only a sip of water. · Follow the instructions about when to stop eating and drinking. If you don't, your surgery may be canceled. · Follow your doctor's instructions about when to bathe or shower before your surgery. · Do not shave the surgical site yourself. · Take off all jewelry and piercings. · Take out contact lenses, if you wear them. · Have a picture ID ready to take with you. Your ID will be checked before your surgery. · Know when to call your doctor. Call your doctor if you:  ? Become ill before surgery. ? Need to reschedule. ? Have changed your mind about having the surgery. What happens before surgery?   Here are some things you can expect to happen before your surgery. · Your picture ID will be checked. · The area of your body that needs surgery is often marked to make sure there are no errors. · You will be kept comfortable and safe by your anesthesia provider. The anesthesia may make you sleep. Or it may just numb the area being worked on. What happens when you are ready to go home? Be sure you have someone drive you home. Anesthesia and pain medicine make it unsafe for you to drive. You will get instructions about recovering from your surgery. This is called a discharge plan. It will cover things like diet, wound care, follow-up care, driving, and getting back to your normal routine. Follow-up care is a key part of your treatment and safety. Be sure to make and go to all appointments, and call your doctor if you are having problems. It's also a good idea to know your test results and keep a list of the medicines you take. Where can you learn more? Go to https://[employer] org and sign in to your [employer] account. Enter Q270 in the E[employer] box to learn more about \"Learning About How to Prepare for Surgery. \"     If you do not have an account, please click on the \"Sign Up Now\" link. Current as of: October 6, 2021               Content Version: 13.1  © 9608-6997 Healthwise, Incorporated. Care instructions adapted under license by [facility] ([facility]). If you have questions about a medical condition or this instruction, always ask your healthcare professional. [address] [place] [address] any warranty or liability for your use of this information.

## 2021-12-30 ENCOUNTER — TELEPHONE (OUTPATIENT)
Dept: ORTHOPEDIC SURGERY | Age: 31
End: 2021-12-30

## 2021-12-30 LAB
SARS-COV-2: NOT DETECTED
SOURCE: NORMAL

## 2022-01-03 ENCOUNTER — HOSPITAL ENCOUNTER (OUTPATIENT)
Age: 32
Setting detail: OUTPATIENT SURGERY
Discharge: HOME | End: 2022-01-03
Attending: ORTHOPAEDIC SURGERY | Admitting: ORTHOPAEDIC SURGERY
Payer: MEDICAID

## 2022-01-03 ENCOUNTER — HOSPITAL ENCOUNTER (OUTPATIENT)
Dept: GENERAL RADIOLOGY | Age: 32
Setting detail: OUTPATIENT SURGERY
Discharge: HOME | End: 2022-01-05
Attending: ORTHOPAEDIC SURGERY
Payer: MEDICAID

## 2022-01-03 ENCOUNTER — ANESTHESIA (OUTPATIENT)
Dept: OPERATING ROOM | Age: 32
End: 2022-01-03
Payer: MEDICAID

## 2022-01-03 VITALS
HEART RATE: 63 BPM | HEIGHT: 66 IN | RESPIRATION RATE: 16 BRPM | DIASTOLIC BLOOD PRESSURE: 63 MMHG | OXYGEN SATURATION: 93 % | TEMPERATURE: 97.1 F | BODY MASS INDEX: 27.32 KG/M2 | SYSTOLIC BLOOD PRESSURE: 103 MMHG | WEIGHT: 170 LBS

## 2022-01-03 VITALS
RESPIRATION RATE: 11 BRPM | OXYGEN SATURATION: 100 % | SYSTOLIC BLOOD PRESSURE: 100 MMHG | DIASTOLIC BLOOD PRESSURE: 59 MMHG

## 2022-01-03 DIAGNOSIS — S82.832A CLOSED FRACTURE OF LEFT TIBIAL PLAFOND WITH FIBULA INVOLVEMENT, INITIAL ENCOUNTER: Primary | ICD-10-CM

## 2022-01-03 DIAGNOSIS — R52 PAIN: ICD-10-CM

## 2022-01-03 DIAGNOSIS — S82.872A CLOSED FRACTURE OF LEFT TIBIAL PLAFOND WITH FIBULA INVOLVEMENT, INITIAL ENCOUNTER: Primary | ICD-10-CM

## 2022-01-03 LAB
AMPHETAMINE SCREEN, URINE: ABNORMAL
BARBITURATE SCREEN URINE: ABNORMAL
BENZODIAZEPINE SCREEN, URINE: ABNORMAL
CANNABINOID SCREEN URINE: POSITIVE
COCAINE METABOLITE SCREEN URINE: POSITIVE
HCG, URINE, POC: NEGATIVE
Lab: ABNORMAL
Lab: NORMAL
METHADONE SCREEN, URINE: ABNORMAL
NEGATIVE QC PASS/FAIL: NORMAL
OPIATE SCREEN URINE: ABNORMAL
OXYCODONE URINE: ABNORMAL
PHENCYCLIDINE SCREEN URINE: ABNORMAL
POSITIVE QC PASS/FAIL: NORMAL
PROPOXYPHENE SCREEN: ABNORMAL

## 2022-01-03 PROCEDURE — 64445 NJX AA&/STRD SCIATIC NRV IMG: CPT | Performed by: STUDENT IN AN ORGANIZED HEALTH CARE EDUCATION/TRAINING PROGRAM

## 2022-01-03 PROCEDURE — 2580000003 HC RX 258: Performed by: ANESTHESIOLOGY

## 2022-01-03 PROCEDURE — 7100000011 HC PHASE II RECOVERY - ADDTL 15 MIN: Performed by: ORTHOPAEDIC SURGERY

## 2022-01-03 PROCEDURE — 2580000003 HC RX 258: Performed by: ORTHOPAEDIC SURGERY

## 2022-01-03 PROCEDURE — 99214 OFFICE O/P EST MOD 30 MIN: CPT | Performed by: ORTHOPAEDIC SURGERY

## 2022-01-03 PROCEDURE — 80307 DRUG TEST PRSMV CHEM ANLYZR: CPT

## 2022-01-03 PROCEDURE — 3700000001 HC ADD 15 MINUTES (ANESTHESIA): Performed by: ORTHOPAEDIC SURGERY

## 2022-01-03 PROCEDURE — 3209999900 FLUORO FOR SURGICAL PROCEDURES

## 2022-01-03 PROCEDURE — 6360000002 HC RX W HCPCS: Performed by: NURSE ANESTHETIST, CERTIFIED REGISTERED

## 2022-01-03 PROCEDURE — 7100000010 HC PHASE II RECOVERY - FIRST 15 MIN: Performed by: ORTHOPAEDIC SURGERY

## 2022-01-03 PROCEDURE — 7100000000 HC PACU RECOVERY - FIRST 15 MIN: Performed by: ORTHOPAEDIC SURGERY

## 2022-01-03 PROCEDURE — 27827 TREAT LOWER LEG FRACTURE: CPT | Performed by: PHYSICIAN ASSISTANT

## 2022-01-03 PROCEDURE — 6360000002 HC RX W HCPCS: Performed by: STUDENT IN AN ORGANIZED HEALTH CARE EDUCATION/TRAINING PROGRAM

## 2022-01-03 PROCEDURE — C1713 ANCHOR/SCREW BN/BN,TIS/BN: HCPCS | Performed by: ORTHOPAEDIC SURGERY

## 2022-01-03 PROCEDURE — 64447 NJX AA&/STRD FEMORAL NRV IMG: CPT | Performed by: STUDENT IN AN ORGANIZED HEALTH CARE EDUCATION/TRAINING PROGRAM

## 2022-01-03 PROCEDURE — 27827 TREAT LOWER LEG FRACTURE: CPT | Performed by: ORTHOPAEDIC SURGERY

## 2022-01-03 PROCEDURE — 3600000014 HC SURGERY LEVEL 4 ADDTL 15MIN: Performed by: ORTHOPAEDIC SURGERY

## 2022-01-03 PROCEDURE — 7100000001 HC PACU RECOVERY - ADDTL 15 MIN: Performed by: ORTHOPAEDIC SURGERY

## 2022-01-03 PROCEDURE — 3600000004 HC SURGERY LEVEL 4 BASE: Performed by: ORTHOPAEDIC SURGERY

## 2022-01-03 PROCEDURE — 2720000010 HC SURG SUPPLY STERILE: Performed by: ORTHOPAEDIC SURGERY

## 2022-01-03 PROCEDURE — 6360000002 HC RX W HCPCS: Performed by: ORTHOPAEDIC SURGERY

## 2022-01-03 PROCEDURE — 2500000003 HC RX 250 WO HCPCS: Performed by: NURSE ANESTHETIST, CERTIFIED REGISTERED

## 2022-01-03 PROCEDURE — 2709999900 HC NON-CHARGEABLE SUPPLY: Performed by: ORTHOPAEDIC SURGERY

## 2022-01-03 PROCEDURE — 3700000000 HC ANESTHESIA ATTENDED CARE: Performed by: ORTHOPAEDIC SURGERY

## 2022-01-03 DEVICE — SCREW BNE L46MM DIA2.7MM MTPHSEAL S STL ST FULL THRD T8: Type: IMPLANTABLE DEVICE | Site: ANKLE | Status: FUNCTIONAL

## 2022-01-03 DEVICE — SCREW BNE L28MM DIA2.7MM MTPHSEAL S STL ST FULL THRD T8: Type: IMPLANTABLE DEVICE | Site: ANKLE | Status: FUNCTIONAL

## 2022-01-03 DEVICE — SCREW BNE L22MM DIA2.7MM ANK S STL ST VAR ANG LOK FULL THRD: Type: IMPLANTABLE DEVICE | Site: ANKLE | Status: FUNCTIONAL

## 2022-01-03 DEVICE — SCREW BNE L26MM DIA2.7MM ANK S STL ST VAR ANG LOK FULL THRD: Type: IMPLANTABLE DEVICE | Site: ANKLE | Status: FUNCTIONAL

## 2022-01-03 DEVICE — IMPLANTABLE DEVICE: Type: IMPLANTABLE DEVICE | Site: ANKLE | Status: FUNCTIONAL

## 2022-01-03 RX ORDER — ROCURONIUM BROMIDE 10 MG/ML
INJECTION, SOLUTION INTRAVENOUS PRN
Status: DISCONTINUED | OUTPATIENT
Start: 2022-01-03 | End: 2022-01-03 | Stop reason: SDUPTHER

## 2022-01-03 RX ORDER — PROPOFOL 10 MG/ML
INJECTION, EMULSION INTRAVENOUS PRN
Status: DISCONTINUED | OUTPATIENT
Start: 2022-01-03 | End: 2022-01-03 | Stop reason: SDUPTHER

## 2022-01-03 RX ORDER — DIPHENHYDRAMINE HYDROCHLORIDE 50 MG/ML
12.5 INJECTION INTRAMUSCULAR; INTRAVENOUS
Status: DISCONTINUED | OUTPATIENT
Start: 2022-01-03 | End: 2022-01-03 | Stop reason: HOSPADM

## 2022-01-03 RX ORDER — MIDAZOLAM HYDROCHLORIDE 1 MG/ML
INJECTION INTRAMUSCULAR; INTRAVENOUS PRN
Status: DISCONTINUED | OUTPATIENT
Start: 2022-01-03 | End: 2022-01-03 | Stop reason: SDUPTHER

## 2022-01-03 RX ORDER — MEPERIDINE HYDROCHLORIDE 25 MG/ML
12.5 INJECTION INTRAMUSCULAR; INTRAVENOUS; SUBCUTANEOUS EVERY 5 MIN PRN
Status: DISCONTINUED | OUTPATIENT
Start: 2022-01-03 | End: 2022-01-03 | Stop reason: HOSPADM

## 2022-01-03 RX ORDER — ONDANSETRON 2 MG/ML
INJECTION INTRAMUSCULAR; INTRAVENOUS PRN
Status: DISCONTINUED | OUTPATIENT
Start: 2022-01-03 | End: 2022-01-03 | Stop reason: SDUPTHER

## 2022-01-03 RX ORDER — FENTANYL CITRATE 50 UG/ML
25 INJECTION, SOLUTION INTRAMUSCULAR; INTRAVENOUS EVERY 5 MIN PRN
Status: DISCONTINUED | OUTPATIENT
Start: 2022-01-03 | End: 2022-01-03 | Stop reason: HOSPADM

## 2022-01-03 RX ORDER — MAGNESIUM HYDROXIDE 1200 MG/15ML
LIQUID ORAL CONTINUOUS PRN
Status: COMPLETED | OUTPATIENT
Start: 2022-01-03 | End: 2022-01-03

## 2022-01-03 RX ORDER — DEXAMETHASONE SODIUM PHOSPHATE 4 MG/ML
INJECTION, SOLUTION INTRA-ARTICULAR; INTRALESIONAL; INTRAMUSCULAR; INTRAVENOUS; SOFT TISSUE PRN
Status: DISCONTINUED | OUTPATIENT
Start: 2022-01-03 | End: 2022-01-03 | Stop reason: SDUPTHER

## 2022-01-03 RX ORDER — SODIUM CHLORIDE, SODIUM LACTATE, POTASSIUM CHLORIDE, CALCIUM CHLORIDE 600; 310; 30; 20 MG/100ML; MG/100ML; MG/100ML; MG/100ML
INJECTION, SOLUTION INTRAVENOUS CONTINUOUS
Status: DISCONTINUED | OUTPATIENT
Start: 2022-01-03 | End: 2022-01-03 | Stop reason: HOSPADM

## 2022-01-03 RX ORDER — FENTANYL CITRATE 50 UG/ML
INJECTION, SOLUTION INTRAMUSCULAR; INTRAVENOUS PRN
Status: DISCONTINUED | OUTPATIENT
Start: 2022-01-03 | End: 2022-01-03 | Stop reason: SDUPTHER

## 2022-01-03 RX ORDER — FENTANYL CITRATE 50 UG/ML
50 INJECTION, SOLUTION INTRAMUSCULAR; INTRAVENOUS EVERY 5 MIN PRN
Status: DISCONTINUED | OUTPATIENT
Start: 2022-01-03 | End: 2022-01-03 | Stop reason: HOSPADM

## 2022-01-03 RX ORDER — PROCHLORPERAZINE EDISYLATE 5 MG/ML
5 INJECTION INTRAMUSCULAR; INTRAVENOUS
Status: DISCONTINUED | OUTPATIENT
Start: 2022-01-03 | End: 2022-01-03 | Stop reason: HOSPADM

## 2022-01-03 RX ORDER — OXYCODONE HYDROCHLORIDE AND ACETAMINOPHEN 5; 325 MG/1; MG/1
1 TABLET ORAL EVERY 6 HOURS PRN
Qty: 28 TABLET | Refills: 0 | Status: SHIPPED | OUTPATIENT
Start: 2022-01-03 | End: 2022-01-10

## 2022-01-03 RX ORDER — ONDANSETRON 2 MG/ML
4 INJECTION INTRAMUSCULAR; INTRAVENOUS
Status: DISCONTINUED | OUTPATIENT
Start: 2022-01-03 | End: 2022-01-03 | Stop reason: HOSPADM

## 2022-01-03 RX ORDER — ASPIRIN 325 MG
325 TABLET, DELAYED RELEASE (ENTERIC COATED) ORAL DAILY
Qty: 30 TABLET | Refills: 3 | Status: SHIPPED | OUTPATIENT
Start: 2022-01-03 | End: 2022-01-28

## 2022-01-03 RX ADMIN — FENTANYL CITRATE 100 MCG: 50 INJECTION, SOLUTION INTRAMUSCULAR; INTRAVENOUS at 13:43

## 2022-01-03 RX ADMIN — SUGAMMADEX 200 MG: 100 INJECTION, SOLUTION INTRAVENOUS at 15:34

## 2022-01-03 RX ADMIN — MIDAZOLAM HYDROCHLORIDE 2 MG: 1 INJECTION, SOLUTION INTRAMUSCULAR; INTRAVENOUS at 13:28

## 2022-01-03 RX ADMIN — DEXAMETHASONE SODIUM PHOSPHATE 4 MG: 4 INJECTION, SOLUTION INTRAMUSCULAR; INTRAVENOUS at 13:50

## 2022-01-03 RX ADMIN — ONDANSETRON 4 MG: 2 INJECTION INTRAMUSCULAR; INTRAVENOUS at 13:50

## 2022-01-03 RX ADMIN — PROPOFOL 200 MG: 10 INJECTION, EMULSION INTRAVENOUS at 13:43

## 2022-01-03 RX ADMIN — SODIUM CHLORIDE, POTASSIUM CHLORIDE, SODIUM LACTATE AND CALCIUM CHLORIDE: 600; 310; 30; 20 INJECTION, SOLUTION INTRAVENOUS at 11:42

## 2022-01-03 RX ADMIN — ROCURONIUM BROMIDE 10 MG: 10 INJECTION INTRAVENOUS at 14:25

## 2022-01-03 RX ADMIN — ROCURONIUM BROMIDE 50 MG: 10 INJECTION INTRAVENOUS at 13:43

## 2022-01-03 RX ADMIN — CEFAZOLIN 2000 MG: 10 INJECTION, POWDER, FOR SOLUTION INTRAVENOUS at 13:38

## 2022-01-03 ASSESSMENT — PULMONARY FUNCTION TESTS
PIF_VALUE: 19
PIF_VALUE: 19
PIF_VALUE: 20
PIF_VALUE: 20
PIF_VALUE: 19
PIF_VALUE: 20
PIF_VALUE: 19
PIF_VALUE: 21
PIF_VALUE: 19
PIF_VALUE: 0
PIF_VALUE: 19
PIF_VALUE: 20
PIF_VALUE: 19
PIF_VALUE: 14
PIF_VALUE: 19
PIF_VALUE: 19
PIF_VALUE: 20
PIF_VALUE: 19
PIF_VALUE: 19
PIF_VALUE: 20
PIF_VALUE: 19
PIF_VALUE: 19
PIF_VALUE: 20
PIF_VALUE: 19
PIF_VALUE: 19
PIF_VALUE: 18
PIF_VALUE: 20
PIF_VALUE: 19
PIF_VALUE: 20
PIF_VALUE: 19
PIF_VALUE: 19
PIF_VALUE: 18
PIF_VALUE: 19
PIF_VALUE: 17
PIF_VALUE: 20
PIF_VALUE: 19
PIF_VALUE: 20
PIF_VALUE: 1
PIF_VALUE: 19
PIF_VALUE: 20
PIF_VALUE: 19
PIF_VALUE: 0
PIF_VALUE: 19
PIF_VALUE: 20
PIF_VALUE: 19
PIF_VALUE: 19
PIF_VALUE: 18
PIF_VALUE: 18
PIF_VALUE: 19
PIF_VALUE: 20
PIF_VALUE: 19
PIF_VALUE: 35
PIF_VALUE: 19
PIF_VALUE: 19
PIF_VALUE: 23
PIF_VALUE: 19
PIF_VALUE: 20
PIF_VALUE: 19
PIF_VALUE: 14
PIF_VALUE: 19
PIF_VALUE: 18
PIF_VALUE: 19
PIF_VALUE: 19
PIF_VALUE: 22
PIF_VALUE: 19
PIF_VALUE: 19
PIF_VALUE: 18
PIF_VALUE: 19
PIF_VALUE: 20
PIF_VALUE: 19
PIF_VALUE: 20
PIF_VALUE: 25
PIF_VALUE: 19
PIF_VALUE: 15
PIF_VALUE: 6
PIF_VALUE: 19
PIF_VALUE: 20

## 2022-01-03 ASSESSMENT — PAIN DESCRIPTION - DESCRIPTORS: DESCRIPTORS: CONSTANT;DISCOMFORT

## 2022-01-03 ASSESSMENT — PAIN - FUNCTIONAL ASSESSMENT: PAIN_FUNCTIONAL_ASSESSMENT: 0-10

## 2022-01-03 NOTE — ANESTHESIA PROCEDURE NOTES
Peripheral Block    Patient location during procedure: pre-op  Start time: 1/3/2022 1:33 PM  End time: 1/3/2022 1:38 PM  Staffing  Performed: anesthesiologist   Anesthesiologist: Helen Gomes MD  Preanesthetic Checklist  Completed: patient identified, IV checked, site marked, risks and benefits discussed, surgical consent, monitors and equipment checked, pre-op evaluation, timeout performed, anesthesia consent given, oxygen available and patient being monitored  Peripheral Block  Patient position: supine  Prep: ChloraPrep  Patient monitoring: cardiac monitor, continuous pulse ox, frequent blood pressure checks and IV access  Block type: Saphenous  Laterality: left  Injection technique: single-shot  Guidance: nerve stimulator and ultrasound guided  Local infiltration: ropivacaine  Infiltration strength: 0.5 %  Dose: 30 mL  Provider prep: mask and sterile gloves (Sterile probe cover)  Local infiltration: ropivacaine  Needle  Needle type: combined needle/nerve stimulator   Needle gauge: 22 G  Needle length: 5 cm  Needle localization: anatomical landmarks and ultrasound guidance  Assessment  Injection assessment: negative aspiration for heme, no paresthesia on injection and local visualized surrounding nerve on ultrasound  Paresthesia pain: immediately resolved  Slow fractionated injection: yes  Hemodynamics: stable  Additional Notes  Ultrasound image printed and saved in patient chart.     Sterile probe cover used    Reason for block: post-op pain management and at surgeon's request

## 2022-01-03 NOTE — OP NOTE
Operative Note      Patient: Trevor Espinoza  YOB: 1990  MRN: [id number]    Date of Procedure: 1/3/2022    Pre-Op Diagnosis: LEFT POSTERIOR pilon fracture    Post-Op Diagnosis: Same       Procedure:  Open reduction internal fixation of posterior pilon fracture    Surgeon(s):  Justin Torre MD    Assistant:   Physician Assistant: Rosalba Maldonado PA-C    Anesthesia: General    Estimated Blood Loss (mL): Minimal    Complications: None    Specimens:   * No specimens in log *    Implants:  Implant Name Type Inv. Item Serial No.  Lot No. LRB No. Used Action   PLATE DISTAL TIBIA 4HL 2.7MM VA- LCP Screw/Plate/Nail/Glen PLATE DISTAL TIBIA 4HL 2.7MM VA- LCP  SYNTHES-PMM  Left 1 Implanted   SCREW BNE L28MM DIA2.7MM MTPHSEAL S STL ST FULL THRD T8  SCREW BNE L28MM DIA2.7MM MTPHSEAL S STL ST FULL THRD T8  DEPUY [employer]  Left 1 Implanted   SCREW BNE L46MM DIA2.7MM MTPHSEAL S STL ST FULL THRD T8  SCREW BNE L46MM DIA2.7MM MTPHSEAL S STL ST FULL THRD T8  DEPUY [employer]-WD  Left 1 Implanted   SCREW BNE L22MM DIA2.7MM ANK S STL ST NEIDA ANG JAI FULL THRD  SCREW BNE L22MM DIA2.7MM ANK S STL ST NEIDA ANG JAI FULL THRD  DEPUY [employer]-WD  Left 1 Implanted   SCREW BNE L26MM DIA2.7MM ANK S STL ST NEIDA ANG JAI FULL THRD  SCREW BNE L26MM DIA2.7MM ANK S STL ST NEIDA ANG JAI FULL THRD  DEPUY [employer]-WD  Left 1 Implanted         Drains: * No LDAs found *    Findings: Comminuted posterior pilon fracture    Detailed Description of Procedure:   Patient was taken the operating room and placed into the prone position. The left lower extremity was appropriately padded and bumped and positioned as appropriate for a posterior lateral approach to the ankle. A timeout was performed, all parties identified, and the correct surgical site was noted. At the conclusion of the timeout, a standard posterior lateral approach was made to the left ankle.   Dissection was carried down through skin and subcutaneous tissue identifying the peroneal nerve which was subsequently protected and transposed toward midline throughout the duration of the procedure. The interval between the peroneal tendons and the flexor hallucis longus was identified. Dissection was carried down through this interval and periosteum was elevated off the posterior tibia revealing complex fracture of the posterior distal tibia. Fracture also demonstrated a coronal split in extension of the fracture essentially across the entirety of the posterior articular surface. Utilizing the fracture plane the incarcerated fragments of bone were subsequently removed. I subsequently proceeded then to reduce the articular surface utilizing cortical reads proximally. This was fixated in place with a K wire. Subsequently proceeded then to place a Synthes posterior malleoli T plate and a mildly under contoured position. This allowed for buttressing/antigliding positioning of the plate and subsequently further reduce the fracture. Minimal gapping at the fracture site was still present given the mild amount of bone loss and this was subsequently eliminated with lag by technique compression screw through the plate itself. An additional locking screw was placed into the distal segment as well as into the proximal shaft stabilizing the construct. External rotation stress test as well as cotton test did not demonstrate any persistent syndesmotic instability and as such no need for syndesmotic stabilization in addition to procedure already performed. Wound was copiously irrigated with normal saline. Skin was closed with a combination of 2-0 Vicryl and 3-0 strata fix. A soft compressive dressing was applied and reapplication of walking boot was performed. An assistant was used during this procedure.   Assistant Kel Gomes PA-C was critical in the functions of positioning, prepping retracting and aiding in the closure and dressing application for this case.    Electronically signed by Marcial Antony MD on 1/3/2022 at 5:44 PM

## 2022-01-03 NOTE — PROGRESS NOTES
Patient taken for block before toxicology report. Dr. Kiko Riddle aware, report not back yet. Kash , Charge nurse also aware.

## 2022-01-03 NOTE — PROGRESS NOTES
Patient given snack and beverage, tolerating well.  Discharge instructions given, patient verbalizes understanding

## 2022-01-03 NOTE — ANESTHESIA PROCEDURE NOTES
Peripheral Block    Patient location during procedure: pre-op  Start time: 1/3/2022 1:28 PM  End time: 1/3/2022 1:33 PM  Staffing  Performed: anesthesiologist   Anesthesiologist: Jamal Tiwari MD  Preanesthetic Checklist  Completed: patient identified, IV checked, site marked, risks and benefits discussed, surgical consent, monitors and equipment checked, pre-op evaluation, timeout performed, anesthesia consent given, oxygen available and patient being monitored  Peripheral Block  Patient position: supine  Prep: ChloraPrep  Patient monitoring: cardiac monitor, continuous pulse ox, frequent blood pressure checks and IV access  Block type: Sciatic  Laterality: left  Injection technique: single-shot  Guidance: nerve stimulator and ultrasound guided  Local infiltration: ropivacaine  Infiltration strength: 0.5 %  Dose: 30 mL  Popliteal  Provider prep: mask and sterile gloves (Sterile probe cover)  Local infiltration: ropivacaine  Needle  Needle type: combined needle/nerve stimulator   Needle gauge: 22 G  Needle length: 10 cm  Needle localization: anatomical landmarks and ultrasound guidance  Assessment  Injection assessment: negative aspiration for heme, no paresthesia on injection and local visualized surrounding nerve on ultrasound  Paresthesia pain: immediately resolved  Slow fractionated injection: yes  Hemodynamics: stable  Additional Notes  Ultrasound image printed and saved in patient chart.     Sterile probe cover used    Reason for block: post-op pain management and at surgeon's request

## 2022-01-03 NOTE — PROGRESS NOTES
CLINICAL PHARMACY NOTE: MEDS TO BEDS    Total # of Prescriptions Filled: 2   The following medications were delivered to the patient:  · Aspirin 325mg tab  · Oxycodone/APAP 5-325mg tab    Additional Documentation: Dorsal Nasal Flap Text: The defect edges were debeveled with a #15 scalpel blade.  Given the location of the defect and the proximity to free margins a dorsal nasal flap was deemed most appropriate.  Using a sterile surgical marker, an appropriate dorsal nasal flap was drawn around the defect.    The area thus outlined was incised deep to adipose tissue with a #15 scalpel blade.  The skin margins were undermined to an appropriate distance in all directions utilizing iris scissors. Deep Sutures: 4-0 Vicryl Epidermal Closure Graft Donor Site (Optional): simple interrupted Fusiform Excision Additional Text (Leave Blank If You Do Not Want): The margin was drawn around the clinically apparent lesion.  A fusiform shape was then drawn on the skin incorporating the lesion and margins.  Incisions were then made along these lines to the appropriate tissue plane and the lesion was extirpated. Mastoid Interpolation Flap Text: A decision was made to reconstruct the defect utilizing an interpolation axial flap and a staged reconstruction.  A telfa template was made of the defect.  This telfa template was then used to outline the mastoid interpolation flap.  The donor area for the pedicle flap was then injected with anesthesia.  The flap was excised through the skin and subcutaneous tissue down to the layer of the underlying musculature.  The pedicle flap was carefully excised within this deep plane to maintain its blood supply.  The edges of the donor site were undermined.   The donor site was closed in a primary fashion.  The pedicle was then rotated into position and sutured.  Once the tube was sutured into place, adequate blood supply was confirmed with blanching and refill.  The pedicle was then wrapped with xeroform gauze and dressed appropriately with a telfa and gauze bandage to ensure continued blood supply and protect the attached pedicle. Modified Advancement Flap Text: The defect edges were debeveled with a #15 scalpel blade.  Given the location of the defect, shape of the defect and the proximity to free margins a modified advancement flap was deemed most appropriate.  Using a sterile surgical marker, an appropriate advancement flap was drawn incorporating the defect and placing the expected incisions within the relaxed skin tension lines where possible.    The area thus outlined was incised deep to adipose tissue with a #15 scalpel blade.  The skin margins were undermined to an appropriate distance in all directions utilizing iris scissors. Purse String (Simple) Text: Given the location of the defect and the characteristics of the surrounding skin a purse string simple closure was deemed most appropriate.  Undermining was performed circumferentially around the surgical defect.  A purse string suture was then placed and tightened. Patient Will Remove Sutures At Home?: No Posterior Auricular Interpolation Flap Text: A decision was made to reconstruct the defect utilizing an interpolation axial flap and a staged reconstruction.  A telfa template was made of the defect.  This telfa template was then used to outline the posterior auricular interpolation flap.  The donor area for the pedicle flap was then injected with anesthesia.  The flap was excised through the skin and subcutaneous tissue down to the layer of the underlying musculature.  The pedicle flap was carefully excised within this deep plane to maintain its blood supply.  The edges of the donor site were undermined.   The donor site was closed in a primary fashion.  The pedicle was then rotated into position and sutured.  Once the tube was sutured into place, adequate blood supply was confirmed with blanching and refill.  The pedicle was then wrapped with xeroform gauze and dressed appropriately with a telfa and gauze bandage to ensure continued blood supply and protect the attached pedicle. Elliptical Excision Additional Text (Leave Blank If You Do Not Want): The margin was drawn around the clinically apparent lesion.  An elliptical shape was then drawn on the skin incorporating the lesion and margins.  Incisions were then made along these lines to the appropriate tissue plane and the lesion was extirpated. Partial Purse String (Intermediate) Text: Given the location of the defect and the characteristics of the surrounding skin an intermediate purse string closure was deemed most appropriate.  Undermining was performed circumferentially around the surgical defect.  A purse string suture was then placed and tightened. Wound tension of the circular defect prevented complete closure of the wound. Mucosal Advancement Flap Text: Given the location of the defect, shape of the defect and the proximity to free margins a mucosal advancement flap was deemed most appropriate. Incisions were made with a 15 blade scalpel in the appropriate fashion along the cutaneous vermillion border and the mucosal lip. The remaining actinically damaged mucosal tissue was excised.  The mucosal advancement flap was then elevated to the gingival sulcus with care taken to preserve the neurovascular structures and advanced into the primary defect. Care was taken to ensure that precise realignment of the vermillion border was achieved. Show Primary And Secondary Defect Sizes Variable (Do Not Hide If You Perform Flaps Or Graft Closures): Yes Complex Repair And Ftsg Text: The defect edges were debeveled with a #15 scalpel blade.  The primary defect was closed partially with a complex linear closure.  Given the location of the defect, shape of the defect and the proximity to free margins a full thickness skin graft was deemed most appropriate to repair the remaining defect.  The graft was trimmed to fit the size of the remaining defect.  The graft was then placed in the primary defect, oriented appropriately, and sutured into place. Dressing: pressure dressing with telfa Island Pedicle Flap Text: The defect edges were debeveled with a #15 scalpel blade.  Given the location of the defect, shape of the defect and the proximity to free margins an island pedicle advancement flap was deemed most appropriate.  Using a sterile surgical marker, an appropriate advancement flap was drawn incorporating the defect, outlining the appropriate donor tissue and placing the expected incisions within the relaxed skin tension lines where possible.    The area thus outlined was incised deep to adipose tissue with a #15 scalpel blade.  The skin margins were undermined to an appropriate distance in all directions around the primary defect and laterally outward around the island pedicle utilizing iris scissors.  There was minimal undermining beneath the pedicle flap. Excision Method: Elliptical Hemostasis: Electrocautery Hatchet Flap Text: The defect edges were debeveled with a #15 scalpel blade.  Given the location of the defect, shape of the defect and the proximity to free margins a hatchet flap was deemed most appropriate.  Using a sterile surgical marker, an appropriate hatchet flap was drawn incorporating the defect and placing the expected incisions within the relaxed skin tension lines where possible.    The area thus outlined was incised deep to adipose tissue with a #15 scalpel blade.  The skin margins were undermined to an appropriate distance in all directions utilizing iris scissors. Partial Purse String (Simple) Text: Given the location of the defect and the characteristics of the surrounding skin a simple purse string closure was deemed most appropriate.  Undermining was performed circumferentially around the surgical defect.  A purse string suture was then placed and tightened. Wound tension of the circular defect prevented complete closure of the wound. Island Pedicle Flap With Canthal Suspension Text: The defect edges were debeveled with a #15 scalpel blade.  Given the location of the defect, shape of the defect and the proximity to free margins an island pedicle advancement flap was deemed most appropriate.  Using a sterile surgical marker, an appropriate advancement flap was drawn incorporating the defect, outlining the appropriate donor tissue and placing the expected incisions within the relaxed skin tension lines where possible. The area thus outlined was incised deep to adipose tissue with a #15 scalpel blade.  The skin margins were undermined to an appropriate distance in all directions around the primary defect and laterally outward around the island pedicle utilizing iris scissors.  There was minimal undermining beneath the pedicle flap. A suspension suture was placed in the canthal tendon to prevent tension and prevent ectropion. Complex Repair And Split-Thickness Skin Graft Text: The defect edges were debeveled with a #15 scalpel blade.  The primary defect was closed partially with a complex linear closure.  Given the location of the defect, shape of the defect and the proximity to free margins a split thickness skin graft was deemed most appropriate to repair the remaining defect.  The graft was trimmed to fit the size of the remaining defect.  The graft was then placed in the primary defect, oriented appropriately, and sutured into place. Skin Substitute Units (Will Override Primary Defect Units If Greater Than 0): 0 Saucerization Excision Additional Text (Leave Blank If You Do Not Want): The margin was drawn around the clinically apparent lesion.  Incisions were then made along these lines, in a tangential fashion, to the appropriate tissue plane and the lesion was extirpated. Paramedian Forehead Flap Text: A decision was made to reconstruct the defect utilizing an interpolation axial flap and a staged reconstruction.  A telfa template was made of the defect.  This telfa template was then used to outline the paramedian forehead pedicle flap.  The donor area for the pedicle flap was then injected with anesthesia.  The flap was excised through the skin and subcutaneous tissue down to the layer of the underlying musculature.  The pedicle flap was carefully excised within this deep plane to maintain its blood supply.  The edges of the donor site were undermined.   The donor site was closed in a primary fashion.  The pedicle was then rotated into position and sutured.  Once the tube was sutured into place, adequate blood supply was confirmed with blanching and refill.  The pedicle was then wrapped with xeroform gauze and dressed appropriately with a telfa and gauze bandage to ensure continued blood supply and protect the attached pedicle. Complex Repair And Epidermal Autograft Text: The defect edges were debeveled with a #15 scalpel blade.  The primary defect was closed partially with a complex linear closure.  Given the location of the defect, shape of the defect and the proximity to free margins an epidermal autograft was deemed most appropriate to repair the remaining defect.  The graft was trimmed to fit the size of the remaining defect.  The graft was then placed in the primary defect, oriented appropriately, and sutured into place. Alar Island Pedicle Flap Text: The defect edges were debeveled with a #15 scalpel blade.  Given the location of the defect, shape of the defect and the proximity to the alar rim an island pedicle advancement flap was deemed most appropriate.  Using a sterile surgical marker, an appropriate advancement flap was drawn incorporating the defect, outlining the appropriate donor tissue and placing the expected incisions within the nasal ala running parallel to the alar rim. The area thus outlined was incised with a #15 scalpel blade.  The skin margins were undermined minimally to an appropriate distance in all directions around the primary defect and laterally outward around the island pedicle utilizing iris scissors.  There was minimal undermining beneath the pedicle flap. Lip Wedge Excision Repair Text: Given the location of the defect and the proximity to free margins a full thickness wedge repair was deemed most appropriate.  Using a sterile surgical marker, the appropriate repair was drawn incorporating the defect and placing the expected incisions perpendicular to the vermillion border.  The vermillion border was also meticulously outlined to ensure appropriate reapproximation during the repair.  The area thus outlined was incised through and through with a #15 scalpel blade.  The muscularis and dermis were reaproximated with deep sutures following hemostasis. Care was taken to realign the vermillion border before proceeding with the superficial closure.  Once the vermillion was realigned the superfical and mucosal closure was finished. Slit Excision Additional Text (Leave Blank If You Do Not Want): A linear line was drawn on the skin overlying the lesion. An incision was made slowly until the lesion was visualized.  Once visualized, the lesion was removed with blunt dissection. Rotation Flap Text: The defect edges were debeveled with a #15 scalpel blade.  Given the location of the defect, shape of the defect and the proximity to free margins a rotation flap was deemed most appropriate.  Using a sterile surgical marker, an appropriate rotation flap was drawn incorporating the defect and placing the expected incisions within the relaxed skin tension lines where possible.    The area thus outlined was incised deep to adipose tissue with a #15 scalpel blade.  The skin margins were undermined to an appropriate distance in all directions utilizing iris scissors. Anesthesia Volume In Cc: 4 Complex Repair And Single Advancement Flap Text: The defect edges were debeveled with a #15 scalpel blade.  The primary defect was closed partially with a complex linear closure.  Given the location of the remaining defect, shape of the defect and the proximity to free margins a single advancement flap was deemed most appropriate for complete closure of the defect.  Using a sterile surgical marker, an appropriate advancement flap was drawn incorporating the defect and placing the expected incisions within the relaxed skin tension lines where possible.    The area thus outlined was incised deep to adipose tissue with a #15 scalpel blade.  The skin margins were undermined to an appropriate distance in all directions utilizing iris scissors. Billing Type: Third-Party Bill Complex Repair And Double Advancement Flap Text: The defect edges were debeveled with a #15 scalpel blade.  The primary defect was closed partially with a complex linear closure.  Given the location of the remaining defect, shape of the defect and the proximity to free margins a double advancement flap was deemed most appropriate for complete closure of the defect.  Using a sterile surgical marker, an appropriate advancement flap was drawn incorporating the defect and placing the expected incisions within the relaxed skin tension lines where possible.    The area thus outlined was incised deep to adipose tissue with a #15 scalpel blade.  The skin margins were undermined to an appropriate distance in all directions utilizing iris scissors. Spiral Flap Text: The defect edges were debeveled with a #15 scalpel blade.  Given the location of the defect, shape of the defect and the proximity to free margins a spiral flap was deemed most appropriate.  Using a sterile surgical marker, an appropriate rotation flap was drawn incorporating the defect and placing the expected incisions within the relaxed skin tension lines where possible. The area thus outlined was incised deep to adipose tissue with a #15 scalpel blade.  The skin margins were undermined to an appropriate distance in all directions utilizing iris scissors. Estimated Blood Loss (Cc): minimal Complex Repair And Dermal Autograft Text: The defect edges were debeveled with a #15 scalpel blade.  The primary defect was closed partially with a complex linear closure.  Given the location of the defect, shape of the defect and the proximity to free margins an dermal autograft was deemed most appropriate to repair the remaining defect.  The graft was trimmed to fit the size of the remaining defect.  The graft was then placed in the primary defect, oriented appropriately, and sutured into place. Double Island Pedicle Flap Text: The defect edges were debeveled with a #15 scalpel blade.  Given the location of the defect, shape of the defect and the proximity to free margins a double island pedicle advancement flap was deemed most appropriate.  Using a sterile surgical marker, an appropriate advancement flap was drawn incorporating the defect, outlining the appropriate donor tissue and placing the expected incisions within the relaxed skin tension lines where possible.    The area thus outlined was incised deep to adipose tissue with a #15 scalpel blade.  The skin margins were undermined to an appropriate distance in all directions around the primary defect and laterally outward around the island pedicle utilizing iris scissors.  There was minimal undermining beneath the pedicle flap. Repair Type: Intermediate Ftsg Text: The defect edges were debeveled with a #15 scalpel blade.  Given the location of the defect, shape of the defect and the proximity to free margins a full thickness skin graft was deemed most appropriate.  Using a sterile surgical marker, the primary defect shape was transferred to the donor site. The area thus outlined was incised deep to adipose tissue with a #15 scalpel blade.  The harvested graft was then trimmed of adipose tissue until only dermis and epidermis was left.  The skin margins of the secondary defect were undermined to an appropriate distance in all directions utilizing iris scissors.  The secondary defect was closed with interrupted buried subcutaneous sutures.  The skin edges were then re-apposed with running  sutures.  The skin graft was then placed in the primary defect and oriented appropriately. Star Wedge Flap Text: The defect edges were debeveled with a #15 scalpel blade.  Given the location of the defect, shape of the defect and the proximity to free margins a star wedge flap was deemed most appropriate.  Using a sterile surgical marker, an appropriate rotation flap was drawn incorporating the defect and placing the expected incisions within the relaxed skin tension lines where possible. The area thus outlined was incised deep to adipose tissue with a #15 scalpel blade.  The skin margins were undermined to an appropriate distance in all directions utilizing iris scissors. Complex Repair And Modified Advancement Flap Text: The defect edges were debeveled with a #15 scalpel blade.  The primary defect was closed partially with a complex linear closure.  Given the location of the remaining defect, shape of the defect and the proximity to free margins a modified advancement flap was deemed most appropriate for complete closure of the defect.  Using a sterile surgical marker, an appropriate advancement flap was drawn incorporating the defect and placing the expected incisions within the relaxed skin tension lines where possible.    The area thus outlined was incised deep to adipose tissue with a #15 scalpel blade.  The skin margins were undermined to an appropriate distance in all directions utilizing iris scissors. Island Pedicle Flap-Requiring Vessel Identification Text: The defect edges were debeveled with a #15 scalpel blade.  Given the location of the defect, shape of the defect and the proximity to free margins an island pedicle advancement flap was deemed most appropriate.  Using a sterile surgical marker, an appropriate advancement flap was drawn, based on the axial vessel mentioned above, incorporating the defect, outlining the appropriate donor tissue and placing the expected incisions within the relaxed skin tension lines where possible.    The area thus outlined was incised deep to adipose tissue with a #15 scalpel blade.  The skin margins were undermined to an appropriate distance in all directions around the primary defect and laterally outward around the island pedicle utilizing iris scissors.  There was minimal undermining beneath the pedicle flap. Complex Repair And Tissue Cultured Epidermal Autograft Text: The defect edges were debeveled with a #15 scalpel blade.  The primary defect was closed partially with a complex linear closure.  Given the location of the defect, shape of the defect and the proximity to free margins an tissue cultured epidermal autograft was deemed most appropriate to repair the remaining defect.  The graft was trimmed to fit the size of the remaining defect.  The graft was then placed in the primary defect, oriented appropriately, and sutured into place. Perilesional Excision Additional Text (Leave Blank If You Do Not Want): The margin was drawn around the clinically apparent lesion. Incisions were then made along these lines to the appropriate tissue plane and the lesion was extirpated. Split-Thickness Skin Graft Text: The defect edges were debeveled with a #15 scalpel blade.  Given the location of the defect, shape of the defect and the proximity to free margins a split thickness skin graft was deemed most appropriate.  Using a sterile surgical marker, the primary defect shape was transferred to the donor site. The split thickness graft was then harvested.  The skin graft was then placed in the primary defect and oriented appropriately. Positioning (Leave Blank If You Do Not Want): The patient was placed in a comfortable position exposing the surgical site. Keystone Flap Text: The defect edges were debeveled with a #15 scalpel blade.  Given the location of the defect, shape of the defect a keystone flap was deemed most appropriate.  Using a sterile surgical marker, an appropriate keystone flap was drawn incorporating the defect, outlining the appropriate donor tissue and placing the expected incisions within the relaxed skin tension lines where possible. The area thus outlined was incised deep to adipose tissue with a #15 scalpel blade.  The skin margins were undermined to an appropriate distance in all directions around the primary defect and laterally outward around the flap utilizing iris scissors. Complex Repair And A-T Advancement Flap Text: The defect edges were debeveled with a #15 scalpel blade.  The primary defect was closed partially with a complex linear closure.  Given the location of the remaining defect, shape of the defect and the proximity to free margins an A-T advancement flap was deemed most appropriate for complete closure of the defect.  Using a sterile surgical marker, an appropriate advancement flap was drawn incorporating the defect and placing the expected incisions within the relaxed skin tension lines where possible.    The area thus outlined was incised deep to adipose tissue with a #15 scalpel blade.  The skin margins were undermined to an appropriate distance in all directions utilizing iris scissors. Transposition Flap Text: The defect edges were debeveled with a #15 scalpel blade.  Given the location of the defect and the proximity to free margins a transposition flap was deemed most appropriate.  Using a sterile surgical marker, an appropriate transposition flap was drawn incorporating the defect.    The area thus outlined was incised deep to adipose tissue with a #15 scalpel blade.  The skin margins were undermined to an appropriate distance in all directions utilizing iris scissors. Intermediate / Complex Repair - Final Wound Length In Cm: 3.6 Repair Performed By Another Provider Text (Leave Blank If You Do Not Want): After the tissue was excised the defect was repaired by another provider. Complex Repair And O-T Advancement Flap Text: The defect edges were debeveled with a #15 scalpel blade.  The primary defect was closed partially with a complex linear closure.  Given the location of the remaining defect, shape of the defect and the proximity to free margins an O-T advancement flap was deemed most appropriate for complete closure of the defect.  Using a sterile surgical marker, an appropriate advancement flap was drawn incorporating the defect and placing the expected incisions within the relaxed skin tension lines where possible.    The area thus outlined was incised deep to adipose tissue with a #15 scalpel blade.  The skin margins were undermined to an appropriate distance in all directions utilizing iris scissors. Muscle Hinge Flap Text: The defect edges were debeveled with a #15 scalpel blade.  Given the size, depth and location of the defect and the proximity to free margins a muscle hinge flap was deemed most appropriate.  Using a sterile surgical marker, an appropriate hinge flap was drawn incorporating the defect. The area thus outlined was incised with a #15 scalpel blade.  The skin margins were undermined to an appropriate distance in all directions utilizing iris scissors. Complex Repair And Skin Substitute Graft Text: The defect edges were debeveled with a #15 scalpel blade.  The primary defect was closed partially with a complex linear closure.  Given the location of the remaining defect, shape of the defect and the proximity to free margins a skin substitute graft was deemed most appropriate to repair the remaining defect.  The graft was trimmed to fit the size of the remaining defect.  The graft was then placed in the primary defect, oriented appropriately, and sutured into place. O-T Plasty Text: The defect edges were debeveled with a #15 scalpel blade.  Given the location of the defect, shape of the defect and the proximity to free margins an O-T plasty was deemed most appropriate.  Using a sterile surgical marker, an appropriate O-T plasty was drawn incorporating the defect and placing the expected incisions within the relaxed skin tension lines where possible.    The area thus outlined was incised deep to adipose tissue with a #15 scalpel blade.  The skin margins were undermined to an appropriate distance in all directions utilizing iris scissors. Pre-Excision Curettage Text (Leave Blank If You Do Not Want): Prior to drawing the surgical margin the visible lesion was removed with electrodesiccation and curettage to clearly define the lesion size. Melolabial Transposition Flap Text: The defect edges were debeveled with a #15 scalpel blade.  Given the location of the defect and the proximity to free margins a melolabial flap was deemed most appropriate.  Using a sterile surgical marker, an appropriate melolabial transposition flap was drawn incorporating the defect.    The area thus outlined was incised deep to adipose tissue with a #15 scalpel blade.  The skin margins were undermined to an appropriate distance in all directions utilizing iris scissors. Epidermal Sutures: 5-0 Nylon Complex Repair And O-L Flap Text: The defect edges were debeveled with a #15 scalpel blade.  The primary defect was closed partially with a complex linear closure.  Given the location of the remaining defect, shape of the defect and the proximity to free margins an O-L flap was deemed most appropriate for complete closure of the defect.  Using a sterile surgical marker, an appropriate flap was drawn incorporating the defect and placing the expected incisions within the relaxed skin tension lines where possible.    The area thus outlined was incised deep to adipose tissue with a #15 scalpel blade.  The skin margins were undermined to an appropriate distance in all directions utilizing iris scissors. O-Z Plasty Text: The defect edges were debeveled with a #15 scalpel blade.  Given the location of the defect, shape of the defect and the proximity to free margins an O-Z plasty (double transposition flap) was deemed most appropriate.  Using a sterile surgical marker, the appropriate transposition flaps were drawn incorporating the defect and placing the expected incisions within the relaxed skin tension lines where possible.    The area thus outlined was incised deep to adipose tissue with a #15 scalpel blade.  The skin margins were undermined to an appropriate distance in all directions utilizing iris scissors.  Hemostasis was achieved with electrocautery.  The flaps were then transposed into place, one clockwise and the other counterclockwise, and anchored with interrupted buried subcutaneous sutures. Complex Repair Preamble Text (Leave Blank If You Do Not Want): Extensive wide undermining was performed. Lab: [id number] Repair - Repaired With Adjacent Surgical Defect Text (Leave Blank If You Do Not Want): After the excision the defect was repaired concurrently with another surgical defect which was in close approximation. Surgeon (Optional): Dr. Broderick Path Notes (To The Dermatopathologist): Please check margins. Detail Level: Detailed Advancement Flap (Single) Text: The defect edges were debeveled with a #15 scalpel blade.  Given the location of the defect and the proximity to free margins a single advancement flap was deemed most appropriate.  Using a sterile surgical marker, an appropriate advancement flap was drawn incorporating the defect and placing the expected incisions within the relaxed skin tension lines where possible.    The area thus outlined was incised deep to adipose tissue with a #15 scalpel blade.  The skin margins were undermined to an appropriate distance in all directions utilizing iris scissors. Intermediate Repair Preamble Text (Leave Blank If You Do Not Want): Undermining was performed with blunt dissection. Complex Repair And Bilobe Flap Text: The defect edges were debeveled with a #15 scalpel blade.  The primary defect was closed partially with a complex linear closure.  Given the location of the remaining defect, shape of the defect and the proximity to free margins a bilobe flap was deemed most appropriate for complete closure of the defect.  Using a sterile surgical marker, an appropriate advancement flap was drawn incorporating the defect and placing the expected incisions within the relaxed skin tension lines where possible.    The area thus outlined was incised deep to adipose tissue with a #15 scalpel blade.  The skin margins were undermined to an appropriate distance in all directions utilizing iris scissors. Rhombic Flap Text: The defect edges were debeveled with a #15 scalpel blade.  Given the location of the defect and the proximity to free margins a rhombic flap was deemed most appropriate.  Using a sterile surgical marker, an appropriate rhombic flap was drawn incorporating the defect.    The area thus outlined was incised deep to adipose tissue with a #15 scalpel blade.  The skin margins were undermined to an appropriate distance in all directions utilizing iris scissors. Lab Facility: [id number] Double O-Z Plasty Text: The defect edges were debeveled with a #15 scalpel blade.  Given the location of the defect, shape of the defect and the proximity to free margins a Double O-Z plasty (double transposition flap) was deemed most appropriate.  Using a sterile surgical marker, the appropriate transposition flaps were drawn incorporating the defect and placing the expected incisions within the relaxed skin tension lines where possible. The area thus outlined was incised deep to adipose tissue with a #15 scalpel blade.  The skin margins were undermined to an appropriate distance in all directions utilizing iris scissors.  Hemostasis was achieved with electrocautery.  The flaps were then transposed into place, one clockwise and the other counterclockwise, and anchored with interrupted buried subcutaneous sutures. Excision Depth: adipose tissue Graft Donor Site Bandage (Optional-Leave Blank If You Don't Want In Note): Steri-strips and a pressure bandage were applied to the donor site. Complex Repair And Melolabial Flap Text: The defect edges were debeveled with a #15 scalpel blade.  The primary defect was closed partially with a complex linear closure.  Given the location of the remaining defect, shape of the defect and the proximity to free margins a melolabial flap was deemed most appropriate for complete closure of the defect.  Using a sterile surgical marker, an appropriate advancement flap was drawn incorporating the defect and placing the expected incisions within the relaxed skin tension lines where possible.    The area thus outlined was incised deep to adipose tissue with a #15 scalpel blade.  The skin margins were undermined to an appropriate distance in all directions utilizing iris scissors. Rhomboid Transposition Flap Text: The defect edges were debeveled with a #15 scalpel blade.  Given the location of the defect and the proximity to free margins a rhomboid transposition flap was deemed most appropriate.  Using a sterile surgical marker, an appropriate rhomboid flap was drawn incorporating the defect.    The area thus outlined was incised deep to adipose tissue with a #15 scalpel blade.  The skin margins were undermined to an appropriate distance in all directions utilizing iris scissors. S Plasty Text: Given the location and shape of the defect, and the orientation of relaxed skin tension lines, an S-plasty was deemed most appropriate for repair.  Using a sterile surgical marker, the appropriate outline of the S-plasty was drawn, incorporating the defect and placing the expected incisions within the relaxed skin tension lines where possible.  The area thus outlined was incised deep to adipose tissue with a #15 scalpel blade.  The skin margins were undermined to an appropriate distance in all directions utilizing iris scissors. The skin flaps were advanced over the defect.  The opposing margins were then approximated with interrupted buried subcutaneous sutures. Advancement Flap (Double) Text: The defect edges were debeveled with a #15 scalpel blade.  Given the location of the defect and the proximity to free margins a double advancement flap was deemed most appropriate.  Using a sterile surgical marker, the appropriate advancement flaps were drawn incorporating the defect and placing the expected incisions within the relaxed skin tension lines where possible.    The area thus outlined was incised deep to adipose tissue with a #15 scalpel blade.  The skin margins were undermined to an appropriate distance in all directions utilizing iris scissors. Bi-Rhombic Flap Text: The defect edges were debeveled with a #15 scalpel blade.  Given the location of the defect and the proximity to free margins a bi-rhombic flap was deemed most appropriate.  Using a sterile surgical marker, an appropriate rhombic flap was drawn incorporating the defect. The area thus outlined was incised deep to adipose tissue with a #15 scalpel blade.  The skin margins were undermined to an appropriate distance in all directions utilizing iris scissors. Complex Repair And Rotation Flap Text: The defect edges were debeveled with a #15 scalpel blade.  The primary defect was closed partially with a complex linear closure.  Given the location of the remaining defect, shape of the defect and the proximity to free margins a rotation flap was deemed most appropriate for complete closure of the defect.  Using a sterile surgical marker, an appropriate advancement flap was drawn incorporating the defect and placing the expected incisions within the relaxed skin tension lines where possible.    The area thus outlined was incised deep to adipose tissue with a #15 scalpel blade.  The skin margins were undermined to an appropriate distance in all directions utilizing iris scissors. V-Y Plasty Text: The defect edges were debeveled with a #15 scalpel blade.  Given the location of the defect, shape of the defect and the proximity to free margins an V-Y advancement flap was deemed most appropriate.  Using a sterile surgical marker, an appropriate advancement flap was drawn incorporating the defect and placing the expected incisions within the relaxed skin tension lines where possible.    The area thus outlined was incised deep to adipose tissue with a #15 scalpel blade.  The skin margins were undermined to an appropriate distance in all directions utilizing iris scissors. Consent was obtained from the patient. The risks and benefits to therapy were discussed in detail. Specifically, the risks of infection, scarring, bleeding, prolonged wound healing, incomplete removal, allergy to anesthesia, nerve injury and recurrence were addressed. Prior to the procedure, the treatment site was clearly identified and confirmed by the patient. All components of Universal Protocol/PAUSE Rule completed. Burow's Advancement Flap Text: The defect edges were debeveled with a #15 scalpel blade.  Given the location of the defect and the proximity to free margins a Burow's advancement flap was deemed most appropriate.  Using a sterile surgical marker, the appropriate advancement flap was drawn incorporating the defect and placing the expected incisions within the relaxed skin tension lines where possible.    The area thus outlined was incised deep to adipose tissue with a #15 scalpel blade.  The skin margins were undermined to an appropriate distance in all directions utilizing iris scissors. Cartilage Graft Text: The defect edges were debeveled with a #15 scalpel blade.  Given the location of the defect, shape of the defect, the fact the defect involved a full thickness cartilage defect a cartilage graft was deemed most appropriate.  An appropriate donor site was identified, cleansed, and anesthetized. The cartilage graft was then harvested and transferred to the recipient site, oriented appropriately and then sutured into place.  The secondary defect was then repaired using a primary closure. Composite Graft Text: The defect edges were debeveled with a #15 scalpel blade.  Given the location of the defect, shape of the defect, the proximity to free margins and the fact the defect was full thickness a composite graft was deemed most appropriate.  The defect was outline and then transferred to the donor site.  A full thickness graft was then excised from the donor site. The graft was then placed in the primary defect, oriented appropriately and then sutured into place.  The secondary defect was then repaired using a primary closure. Crescentic Advancement Flap Text: The defect edges were debeveled with a #15 scalpel blade.  Given the location of the defect and the proximity to free margins a crescentic advancement flap was deemed most appropriate.  Using a sterile surgical marker, the appropriate advancement flap was drawn incorporating the defect and placing the expected incisions within the relaxed skin tension lines where possible.    The area thus outlined was incised deep to adipose tissue with a #15 scalpel blade.  The skin margins were undermined to an appropriate distance in all directions utilizing iris scissors. Complex Repair And Rhombic Flap Text: The defect edges were debeveled with a #15 scalpel blade.  The primary defect was closed partially with a complex linear closure.  Given the location of the remaining defect, shape of the defect and the proximity to free margins a rhombic flap was deemed most appropriate for complete closure of the defect.  Using a sterile surgical marker, an appropriate advancement flap was drawn incorporating the defect and placing the expected incisions within the relaxed skin tension lines where possible.    The area thus outlined was incised deep to adipose tissue with a #15 scalpel blade.  The skin margins were undermined to an appropriate distance in all directions utilizing iris scissors. Helical Rim Advancement Flap Text: The defect edges were debeveled with a #15 blade scalpel.  Given the location of the defect and the proximity to free margins (helical rim) a double helical rim advancement flap was deemed most appropriate.  Using a sterile surgical marker, the appropriate advancement flaps were drawn incorporating the defect and placing the expected incisions between the helical rim and antihelix where possible.  The area thus outlined was incised through and through with a #15 scalpel blade.  With a skin hook and iris scissors, the flaps were gently and sharply undermined and freed up. H Plasty Text: Given the location of the defect, shape of the defect and the proximity to free margins a H-plasty was deemed most appropriate for repair.  Using a sterile surgical marker, the appropriate advancement arms of the H-plasty were drawn incorporating the defect and placing the expected incisions within the relaxed skin tension lines where possible. The area thus outlined was incised deep to adipose tissue with a #15 scalpel blade. The skin margins were undermined to an appropriate distance in all directions utilizing iris scissors.  The opposing advancement arms were then advanced into place in opposite direction and anchored with interrupted buried subcutaneous sutures. Scalpel Size: 15 blade Previous Accession (Optional): [id number] Bilateral Helical Rim Advancement Flap Text: The defect edges were debeveled with a #15 blade scalpel.  Given the location of the defect and the proximity to free margins (helical rim) a bilateral helical rim advancement flap was deemed most appropriate.  Using a sterile surgical marker, the appropriate advancement flaps were drawn incorporating the defect and placing the expected incisions between the helical rim and antihelix where possible.  The area thus outlined was incised through and through with a #15 scalpel blade.  With a skin hook and iris scissors, the flaps were gently and sharply undermined and freed up. Complex Repair And Transposition Flap Text: The defect edges were debeveled with a #15 scalpel blade.  The primary defect was closed partially with a complex linear closure.  Given the location of the remaining defect, shape of the defect and the proximity to free margins a transposition flap was deemed most appropriate for complete closure of the defect.  Using a sterile surgical marker, an appropriate advancement flap was drawn incorporating the defect and placing the expected incisions within the relaxed skin tension lines where possible.    The area thus outlined was incised deep to adipose tissue with a #15 scalpel blade.  The skin margins were undermined to an appropriate distance in all directions utilizing iris scissors. W Plasty Text: The lesion was extirpated to the level of the fat with a #15 scalpel blade.  Given the location of the defect, shape of the defect and the proximity to free margins a W-plasty was deemed most appropriate for repair.  Using a sterile surgical marker, the appropriate transposition arms of the W-plasty were drawn incorporating the defect and placing the expected incisions within the relaxed skin tension lines where possible.    The area thus outlined was incised deep to adipose tissue with a #15 scalpel blade.  The skin margins were undermined to an appropriate distance in all directions utilizing iris scissors.  The opposing transposition arms were then transposed into place in opposite direction and anchored with interrupted buried subcutaneous sutures. A-T Advancement Flap Text: The defect edges were debeveled with a #15 scalpel blade.  Given the location of the defect, shape of the defect and the proximity to free margins an A-T advancement flap was deemed most appropriate.  Using a sterile surgical marker, an appropriate advancement flap was drawn incorporating the defect and placing the expected incisions within the relaxed skin tension lines where possible.    The area thus outlined was incised deep to adipose tissue with a #15 scalpel blade.  The skin margins were undermined to an appropriate distance in all directions utilizing iris scissors. Epidermal Autograft Text: The defect edges were debeveled with a #15 scalpel blade.  Given the location of the defect, shape of the defect and the proximity to free margins an epidermal autograft was deemed most appropriate.  Using a sterile surgical marker, the primary defect shape was transferred to the donor site. The epidermal graft was then harvested.  The skin graft was then placed in the primary defect and oriented appropriately. Epidermal Closure: running horizontal mattress Ear Star Wedge Flap Text: The defect edges were debeveled with a #15 blade scalpel.  Given the location of the defect and the proximity to free margins (helical rim) an ear star wedge flap was deemed most appropriate.  Using a sterile surgical marker, the appropriate flap was drawn incorporating the defect and placing the expected incisions between the helical rim and antihelix where possible.  The area thus outlined was incised through and through with a #15 scalpel blade. Z Plasty Text: The lesion was extirpated to the level of the fat with a #15 scalpel blade.  Given the location of the defect, shape of the defect and the proximity to free margins a Z-plasty was deemed most appropriate for repair.  Using a sterile surgical marker, the appropriate transposition arms of the Z-plasty were drawn incorporating the defect and placing the expected incisions within the relaxed skin tension lines where possible.    The area thus outlined was incised deep to adipose tissue with a #15 scalpel blade.  The skin margins were undermined to an appropriate distance in all directions utilizing iris scissors.  The opposing transposition arms were then transposed into place in opposite direction and anchored with interrupted buried subcutaneous sutures. Post-Care Instructions: I reviewed with the patient in detail post-care instructions. Patient is not to engage in any heavy lifting, exercise, or swimming for the next 14 days. Should the patient develop any fevers, chills, bleeding, severe pain patient will contact the office immediately. Anesthesia Type: 1% lidocaine with 1:200,000 epinephrine Complex Repair And V-Y Plasty Text: The defect edges were debeveled with a #15 scalpel blade.  The primary defect was closed partially with a complex linear closure.  Given the location of the remaining defect, shape of the defect and the proximity to free margins a V-Y plasty was deemed most appropriate for complete closure of the defect.  Using a sterile surgical marker, an appropriate advancement flap was drawn incorporating the defect and placing the expected incisions within the relaxed skin tension lines where possible.    The area thus outlined was incised deep to adipose tissue with a #15 scalpel blade.  The skin margins were undermined to an appropriate distance in all directions utilizing iris scissors. Date Of Previous Biopsy (Optional): 12/21/18 Home Suture Removal Text: Patient was provided a home suture removal kit and will remove their sutures at home.  If they have any questions or difficulties they will call the office. Anesthesia Type: 1% lidocaine with epinephrine O-T Advancement Flap Text: The defect edges were debeveled with a #15 scalpel blade.  Given the location of the defect, shape of the defect and the proximity to free margins an O-T advancement flap was deemed most appropriate.  Using a sterile surgical marker, an appropriate advancement flap was drawn incorporating the defect and placing the expected incisions within the relaxed skin tension lines where possible.    The area thus outlined was incised deep to adipose tissue with a #15 scalpel blade.  The skin margins were undermined to an appropriate distance in all directions utilizing iris scissors. Dermal Autograft Text: The defect edges were debeveled with a #15 scalpel blade.  Given the location of the defect, shape of the defect and the proximity to free margins a dermal autograft was deemed most appropriate.  Using a sterile surgical marker, the primary defect shape was transferred to the donor site. The area thus outlined was incised deep to adipose tissue with a #15 scalpel blade.  The harvested graft was then trimmed of adipose and epidermal tissue until only dermis was left.  The skin graft was then placed in the primary defect and oriented appropriately. Skin Substitute Text: The defect edges were debeveled with a #15 scalpel blade.  Given the location of the defect, shape of the defect and the proximity to free margins a skin substitute graft was deemed most appropriate.  The graft material was trimmed to fit the size of the defect. The graft was then placed in the primary defect and oriented appropriately. O-L Flap Text: The defect edges were debeveled with a #15 scalpel blade.  Given the location of the defect, shape of the defect and the proximity to free margins an O-L flap was deemed most appropriate.  Using a sterile surgical marker, an appropriate advancement flap was drawn incorporating the defect and placing the expected incisions within the relaxed skin tension lines where possible.    The area thus outlined was incised deep to adipose tissue with a #15 scalpel blade.  The skin margins were undermined to an appropriate distance in all directions utilizing iris scissors. Banner Transposition Flap Text: The defect edges were debeveled with a #15 scalpel blade.  Given the location of the defect and the proximity to free margins a Banner transposition flap was deemed most appropriate.  Using a sterile surgical marker, an appropriate flap drawn around the defect. The area thus outlined was incised deep to adipose tissue with a #15 scalpel blade.  The skin margins were undermined to an appropriate distance in all directions utilizing iris scissors. Complex Repair And M Plasty Text: The defect edges were debeveled with a #15 scalpel blade.  The primary defect was closed partially with a complex linear closure.  Given the location of the remaining defect, shape of the defect and the proximity to free margins an M plasty was deemed most appropriate for complete closure of the defect.  Using a sterile surgical marker, an appropriate advancement flap was drawn incorporating the defect and placing the expected incisions within the relaxed skin tension lines where possible.    The area thus outlined was incised deep to adipose tissue with a #15 scalpel blade.  The skin margins were undermined to an appropriate distance in all directions utilizing iris scissors. Cheek Interpolation Flap Text: A decision was made to reconstruct the defect utilizing an interpolation axial flap and a staged reconstruction.  A telfa template was made of the defect.  This telfa template was then used to outline the Cheek Interpolation flap.  The donor area for the pedicle flap was then injected with anesthesia.  The flap was excised through the skin and subcutaneous tissue down to the layer of the underlying musculature.  The interpolation flap was carefully excised within this deep plane to maintain its blood supply.  The edges of the donor site were undermined.   The donor site was closed in a primary fashion.  The pedicle was then rotated into position and sutured.  Once the tube was sutured into place, adequate blood supply was confirmed with blanching and refill.  The pedicle was then wrapped with xeroform gauze and dressed appropriately with a telfa and gauze bandage to ensure continued blood supply and protect the attached pedicle. Complex Repair And Double M Plasty Text: The defect edges were debeveled with a #15 scalpel blade.  The primary defect was closed partially with a complex linear closure.  Given the location of the remaining defect, shape of the defect and the proximity to free margins a double M plasty was deemed most appropriate for complete closure of the defect.  Using a sterile surgical marker, an appropriate advancement flap was drawn incorporating the defect and placing the expected incisions within the relaxed skin tension lines where possible.    The area thus outlined was incised deep to adipose tissue with a #15 scalpel blade.  The skin margins were undermined to an appropriate distance in all directions utilizing iris scissors. Bilobed Flap Text: The defect edges were debeveled with a #15 scalpel blade.  Given the location of the defect and the proximity to free margins a bilobe flap was deemed most appropriate.  Using a sterile surgical marker, an appropriate bilobe flap drawn around the defect.    The area thus outlined was incised deep to adipose tissue with a #15 scalpel blade.  The skin margins were undermined to an appropriate distance in all directions utilizing iris scissors. Cheek-To-Nose Interpolation Flap Text: A decision was made to reconstruct the defect utilizing an interpolation axial flap and a staged reconstruction.  A telfa template was made of the defect.  This telfa template was then used to outline the Cheek-To-Nose Interpolation flap.  The donor area for the pedicle flap was then injected with anesthesia.  The flap was excised through the skin and subcutaneous tissue down to the layer of the underlying musculature.  The interpolation flap was carefully excised within this deep plane to maintain its blood supply.  The edges of the donor site were undermined.   The donor site was closed in a primary fashion.  The pedicle was then rotated into position and sutured.  Once the tube was sutured into place, adequate blood supply was confirmed with blanching and refill.  The pedicle was then wrapped with xeroform gauze and dressed appropriately with a telfa and gauze bandage to ensure continued blood supply and protect the attached pedicle. V-Y Flap Text: The defect edges were debeveled with a #15 scalpel blade.  Given the location of the defect, shape of the defect and the proximity to free margins a V-Y flap was deemed most appropriate.  Using a sterile surgical marker, an appropriate advancement flap was drawn incorporating the defect and placing the expected incisions within the relaxed skin tension lines where possible.    The area thus outlined was incised deep to adipose tissue with a #15 scalpel blade.  The skin margins were undermined to an appropriate distance in all directions utilizing iris scissors. Tissue Cultured Epidermal Autograft Text: The defect edges were debeveled with a #15 scalpel blade.  Given the location of the defect, shape of the defect and the proximity to free margins a tissue cultured epidermal autograft was deemed most appropriate.  The graft was then trimmed to fit the size of the defect.  The graft was then placed in the primary defect and oriented appropriately. Interpolation Flap Text: A decision was made to reconstruct the defect utilizing an interpolation axial flap and a staged reconstruction.  A telfa template was made of the defect.  This telfa template was then used to outline the interpolation flap.  The donor area for the pedicle flap was then injected with anesthesia.  The flap was excised through the skin and subcutaneous tissue down to the layer of the underlying musculature.  The interpolation flap was carefully excised within this deep plane to maintain its blood supply.  The edges of the donor site were undermined.   The donor site was closed in a primary fashion.  The pedicle was then rotated into position and sutured.  Once the tube was sutured into place, adequate blood supply was confirmed with blanching and refill.  The pedicle was then wrapped with xeroform gauze and dressed appropriately with a telfa and gauze bandage to ensure continued blood supply and protect the attached pedicle. Xenograft Text: The defect edges were debeveled with a #15 scalpel blade.  Given the location of the defect, shape of the defect and the proximity to free margins a xenograft was deemed most appropriate.  The graft was then trimmed to fit the size of the defect.  The graft was then placed in the primary defect and oriented appropriately. Complex Repair And W Plasty Text: The defect edges were debeveled with a #15 scalpel blade.  The primary defect was closed partially with a complex linear closure.  Given the location of the remaining defect, shape of the defect and the proximity to free margins a W plasty was deemed most appropriate for complete closure of the defect.  Using a sterile surgical marker, an appropriate advancement flap was drawn incorporating the defect and placing the expected incisions within the relaxed skin tension lines where possible.    The area thus outlined was incised deep to adipose tissue with a #15 scalpel blade.  The skin margins were undermined to an appropriate distance in all directions utilizing iris scissors. Size Of Lesion In Cm: 0.7 X Size Of Lesion In Cm (Optional): 0.4 Advancement-Rotation Flap Text: The defect edges were debeveled with a #15 scalpel blade.  Given the location of the defect, shape of the defect and the proximity to free margins an advancement-rotation flap was deemed most appropriate.  Using a sterile surgical marker, an appropriate flap was drawn incorporating the defect and placing the expected incisions within the relaxed skin tension lines where possible. The area thus outlined was incised deep to adipose tissue with a #15 scalpel blade.  The skin margins were undermined to an appropriate distance in all directions utilizing iris scissors. Bilobed Transposition Flap Text: The defect edges were debeveled with a #15 scalpel blade.  Given the location of the defect and the proximity to free margins a bilobed transposition flap was deemed most appropriate.  Using a sterile surgical marker, an appropriate bilobe flap drawn around the defect.    The area thus outlined was incised deep to adipose tissue with a #15 scalpel blade.  The skin margins were undermined to an appropriate distance in all directions utilizing iris scissors. Purse String (Intermediate) Text: Given the location of the defect and the characteristics of the surrounding skin a pursestring intermediate closure was deemed most appropriate.  Undermining was performed circumfirentially around the surgical defect.  A purstring suture was then placed and tightened. Mercedes Flap Text: The defect edges were debeveled with a #15 scalpel blade.  Given the location of the defect, shape of the defect and the proximity to free margins a Mercedes flap was deemed most appropriate.  Using a sterile surgical marker, an appropriate advancement flap was drawn incorporating the defect and placing the expected incisions within the relaxed skin tension lines where possible. The area thus outlined was incised deep to adipose tissue with a #15 scalpel blade.  The skin margins were undermined to an appropriate distance in all directions utilizing iris scissors. Trilobed Flap Text: The defect edges were debeveled with a #15 scalpel blade.  Given the location of the defect and the proximity to free margins a trilobed flap was deemed most appropriate.  Using a sterile surgical marker, an appropriate trilobed flap drawn around the defect.    The area thus outlined was incised deep to adipose tissue with a #15 scalpel blade.  The skin margins were undermined to an appropriate distance in all directions utilizing iris scissors. Wound Care: Vaseline Complex Repair And Z Plasty Text: The defect edges were debeveled with a #15 scalpel blade.  The primary defect was closed partially with a complex linear closure.  Given the location of the remaining defect, shape of the defect and the proximity to free margins a Z plasty was deemed most appropriate for complete closure of the defect.  Using a sterile surgical marker, an appropriate advancement flap was drawn incorporating the defect and placing the expected incisions within the relaxed skin tension lines where possible.    The area thus outlined was incised deep to adipose tissue with a #15 scalpel blade.  The skin margins were undermined to an appropriate distance in all directions utilizing iris scissors. Suture Removal: 14 days Melolabial Interpolation Flap Text: A decision was made to reconstruct the defect utilizing an interpolation axial flap and a staged reconstruction.  A telfa template was made of the defect.  This telfa template was then used to outline the melolabial interpolation flap.  The donor area for the pedicle flap was then injected with anesthesia.  The flap was excised through the skin and subcutaneous tissue down to the layer of the underlying musculature.  The pedicle flap was carefully excised within this deep plane to maintain its blood supply.  The edges of the donor site were undermined.   The donor site was closed in a primary fashion.  The pedicle was then rotated into position and sutured.  Once the tube was sutured into place, adequate blood supply was confirmed with blanching and refill.  The pedicle was then wrapped with xeroform gauze and dressed appropriately with a telfa and gauze bandage to ensure continued blood supply and protect the attached pedicle. Curvilinear Excision Additional Text (Leave Blank If You Do Not Want): The margin was drawn around the clinically apparent lesion.  A curvilinear shape was then drawn on the skin incorporating the lesion and margins.  Incisions were then made along these lines to the appropriate tissue plane and the lesion was extirpated. Information: Selecting Yes will display possible errors in your note based on the variables you have selected. This validation is only offered as a suggestion for you. PLEASE NOTE THAT THE VALIDATION TEXT WILL BE REMOVED WHEN YOU FINALIZE YOUR NOTE. IF YOU WANT TO FAX A PRELIMINARY NOTE YOU WILL NEED TO TOGGLE THIS TO 'NO' IF YOU DO NOT WANT IT IN YOUR FAXED NOTE. Complex Repair And Dorsal Nasal Flap Text: The defect edges were debeveled with a #15 scalpel blade.  The primary defect was closed partially with a complex linear closure.  Given the location of the remaining defect, shape of the defect and the proximity to free margins a dorsal nasal flap was deemed most appropriate for complete closure of the defect.  Using a sterile surgical marker, an appropriate flap was drawn incorporating the defect and placing the expected incisions within the relaxed skin tension lines where possible.    The area thus outlined was incised deep to adipose tissue with a #15 scalpel blade.  The skin margins were undermined to an appropriate distance in all directions utilizing iris scissors.

## 2022-01-03 NOTE — H&P
History and physical reviewed from 12/29/2021 is correct without interval change. Plan to proceed with open reduction internal fixation of left ankle pilon fracture.

## 2022-01-03 NOTE — PROGRESS NOTES
Subjective:      Patient ID: Trevor Espinoza is a 33 y.o. female who presents today for:  Left ankle pain and fracture    HPI  Patient sustained a twisting injury to her left ankle with resultant pain and swelling. Ultimately she was diagnosed with a fracture of the left ankle and proximal fibula. She was seen and evaluated by my partner Dr. Eddi Tobar who recommended surgical evaluation and consultation by myself given my specialty and trauma. I recommended evaluation with CT scan to evaluate the complexity of posterior malleoli fracture. Further discussion will be had in regards to the findings however consistent with rotational pilon fracture about the left ankle. Patient here today and states that pain is significantly improved with elevation and motion of her toes. Patient denies any numbness or tingling of her affected extremity. Patient has been maintaining nonweightbearing since that time. Patient denies any loss of consciousness prior to, during or after the fall.     Past Medical History:   Diagnosis Date    Hepatitis C 12/25/2014    Polysubstance abuse (HCC)     Hx opiates, cocaine, ETOH    Restless legs syndrome (RLS)       Past Surgical History:   Procedure Laterality Date    APPENDECTOMY       Social History     Socioeconomic History    Marital status: Single     Spouse name: Not on file    Number of children: Not on file    Years of education: Not on file    Highest education level: Not on file   Occupational History    Not on file   Tobacco Use    Smoking status: Current Every Day Smoker     Packs/day: 1.00     Years: 15.00     Pack years: 15.00     Types: Cigarettes     Start date: 6/5/2005    Smokeless tobacco: Never Used   Substance and Sexual Activity    Alcohol use: No     Alcohol/week: 12.0 standard drinks     Types: 12 Cans of beer per week     Comment: daily    Drug use: No     Types: Marijuana (Weed), IV, Cocaine, Opiates      Comment: weed daily    Sexual activity: Yes Partners: Male   Other Topics Concern    Not on file   Social History Narrative    Not on file     Social Determinants of Health     Financial Resource Strain: Low Risk     Difficulty of Paying Living Expenses: Not hard at all   Food Insecurity: No Food Insecurity    Worried About Running Out of Food in the Last Year: Never true    [address] in the Last Year: Never true   Transportation Needs:     Lack of Transportation (Medical): Not on file    Lack of Transportation (Non-Medical): Not on file   Physical Activity:     Days of Exercise per Week: Not on file    Minutes of Exercise per Session: Not on file   Stress:     Feeling of Stress : Not on file   Social Connections:     Frequency of Communication with Friends and Family: Not on file    Frequency of Social Gatherings with Friends and Family: Not on file    Attends Orthodoxy Services: Not on file    Active Member of [address] [place] [address] Campus Diaries or Organizations: Not on file    Attends Club or Organization Meetings: Not on file    Marital Status: Not on file   Intimate Partner Violence:     Fear of Current or Ex-Partner: Not on file    Emotionally Abused: Not on file    Physically Abused: Not on file    Sexually Abused: Not on file   Housing Stability:     Unable to Pay for Housing in the Last Year: Not on file    Number of [place] in the Last Year: Not on file    Unstable Housing in the Last Year: Not on file     No family history on file. Allergies   Allergen Reactions    No Known Allergies Other (See Comments)     No current facility-administered medications on file prior to encounter. No current outpatient medications on file prior to encounter.          Review of Systems  General: Denies fever, chills  Cardiovascular: Denies chest pain  Pulmonary: Denies shortness of breath  GI: Denies nausea or vomiting  Neuro: Denies numbness or tingling    Objective:   BP (!) 95/54   Pulse 72   Temp 97.8 °F (36.6 °C) (Temporal)   Resp 16   Ht 5' 6\" (1.676 m)   Wt 170 lb (77.1 kg)   LMP 06/03/2021   SpO2 100%   BMI 27.44 kg/m²     Ortho Exam  General: Well-appearing female who appears their stated age  Left lower extremity:  Skin: Intact circumferentially, residual ecchymosis appreciated diffusely throughout the ankle with mild edema and wrinkling diffusely throughout the ankle consistent with skin competent to undergo surgical incisions  Neuro: Sensation intact light touch in the superficial peroneal, deep peroneal and tibial nerve distribution. Dorsiflexion, plantarflexion and extension of the big toe are intact however expectedly limited due to fracture and associated pain. Vascular: Brisk capillary refill in all digits. Foot is warm and well-perfused. Strong palpable dorsalis pedis and posterior tibial pulses. MSK: Patient tender palpation about the left ankle somewhat diffusely. Also tender to palpation about the proximal fibular head. Nontender palpation about the thigh and no pain with rotation of the hip. Radiographs and Laboratory Studies:     Diagnostic Imaging Studies:    AP and lateral views left tib-fib were reviewed from 12/22/2021    Findings are consistent with mildly displaced posterior malleolus fracture with associated proximal fibula fracture spiral in nature at the level of the fibular neck with extension to the head. CT scan of the left ankle from 12/28/2021 was independently reviewed    Findings are consistent with incarcerated articular fragments involving the entirety of the posterior malleolus with extension to the medial side. There is associated displacement and posterior translation of the fibula relative to the incisura of the distal tibia consistent with syndesmotic disruption.   But overall classify as rotational pilon fracture    Laboratory Studies:   Lab Results   Component Value Date    WBC 9.2 12/28/2021    HGB 14.6 12/28/2021    HCT 44.7 12/28/2021    .9 (H) 12/28/2021     12/28/2021 Assessment:      Rotational pilon fracture left ankle     Plan:     Treatment options were discussed with patient and ultimately recommendation is for surgical intervention in the form of open reduction internal fixation of left ankle pilon fracture. Natural progression of this condition left untreated will result in significant pain, possible instability and latent dislocation, progressive arthritis and pain at the ankle. Surgical intervention to restore appropriate anatomic alignment to the ankle to allow for stability, early range of motion and possible prevention of posttraumatic arthritis in the future. Risks of surgical intervention were discussed with patient extensively including not limited to damage to nerves, tendons, vessels, nonunion, malunion, persistent pain, infection, cardiopulmonary complications associated the procedure and anesthesia including not limited to DVT, PE, stroke, heart attack. With knowledge of these risk and benefits patient is electing to proceed. Should be noted, patient did have a positive urine drug screen for cocaine. Patient is aware of risks associated with proceeding having use the substance after discussion with anesthesia feel that moving forward can be done in a safe manner to allow for patient to adequately undergo stabilization of her ankle in a timely manner. Orders Placed This Encounter   Procedures    URINE DRUG SCREEN     Standing Status:   Standing     Number of Occurrences:   1    POC Pregnancy Urine Qual     Standing Status:   Standing     Number of Occurrences:   1     Orders Placed This Encounter   Medications    ceFAZolin (ANCEF) 2000 mg in dextrose 5 % 100 mL IVPB     Order Specific Question:   Antimicrobial Indications     Answer:   Surgical Prophylaxis    lactated ringers infusion       No follow-ups on file. No name on file.

## 2022-01-03 NOTE — ANESTHESIA PRE PROCEDURE
Department of Anesthesiology  Preprocedure Note       Name:  Marva Read   Age:  33 y.o.  :  1990                                          MRN:  [id number]         Date:  1/3/2022      Surgeon: Kevin Fulton):  Olya Oh MD    Procedure: Procedure(s):  REDUCTION INTERNAL FIXATION LEFT POSTERIOR MALLEOLUS FRACTURE CASE COMMENTS: WILL ADVISE (PAT AT [facility]) [address] ON 21 12:00 PM-LUIGI    Medications prior to admission:   Prior to Admission medications    Not on File       Current medications:    Current Facility-Administered Medications   Medication Dose Route Frequency Provider Last Rate Last Admin    ceFAZolin (ANCEF) 2000 mg in dextrose 5 % 100 mL IVPB  2,000 mg IntraVENous On Call to [address] [place] [address] MD Kailash        lactated ringers infusion   IntraVENous Continuous [place] MD [facility] 100 mL/hr at 22 1142 New Bag at 22 1142       Allergies:     Allergies   Allergen Reactions    No Known Allergies Other (See Comments)       Problem List:    Patient Active Problem List   Diagnosis Code    Narcotic abuse, episodic ([facility] F11.10    39 weeks gestation of pregnancy Z3A.39    Overdose on Tylenol, intentional self-harm, initial encounter ([facility] T39.1X2A    Abnormal liver enzymes R74.8    Cocaine abuse ([facility] F14.10    Substance induced mood disorder ([facility] F19.94    Intentional acetaminophen overdose ([facility] T39.1X2A       Past Medical History:        Diagnosis Date    Hepatitis C 2014    Polysubstance abuse (HCC)     Hx opiates, cocaine, ETOH    Restless legs syndrome (RLS)        Past Surgical History:        Procedure Laterality Date    APPENDECTOMY         Social History:    Social History     Tobacco Use    Smoking status: Current Every Day Smoker     Packs/day: 1.00     Years: 15.00     Pack years: 15.00     Types: Cigarettes     Start date: 2005    Smokeless tobacco: Never Used   Substance Use Topics    Alcohol use: No     Alcohol/week: 12.0 standard drinks Types: 12 Cans of beer per week     Comment: daily                                Ready to quit: Not Answered  Counseling given: Not Answered      Vital Signs (Current):   Vitals:    01/03/22 1118   BP: (!) 95/54   Pulse: 72   Resp: 16   Temp: 97.8 °F (36.6 °C)   TempSrc: Temporal   SpO2: 100%   Weight: 170 lb (77.1 kg)   Height: 5' 6\" (1.676 m)                                              BP Readings from Last 3 Encounters:   01/03/22 (!) 95/54   12/29/21 100/68   12/22/21 97/61       NPO Status: Time of last liquid consumption: 2355                        Time of last solid consumption: 2355                        Date of last liquid consumption: 01/02/22                        Date of last solid food consumption: 01/02/22    BMI:   Wt Readings from Last 3 Encounters:   01/03/22 170 lb (77.1 kg)   12/29/21 170 lb (77.1 kg)   12/22/21 160 lb (72.6 kg)     Body mass index is 27.44 kg/m². CBC:   Lab Results   Component Value Date    WBC 9.2 12/28/2021    RBC 4.43 12/28/2021    HGB 14.6 12/28/2021    HCT 44.7 12/28/2021    .9 12/28/2021    RDW 13.8 12/28/2021     12/28/2021       CMP:   Lab Results   Component Value Date     12/28/2021    K 3.9 12/28/2021    K 4.1 12/27/2020     12/28/2021    CO2 24 12/28/2021    BUN 10 12/28/2021    CREATININE 0.66 12/28/2021    GFRAA >60.0 12/28/2021    LABGLOM >60.0 12/28/2021    GLUCOSE 67 12/28/2021    PROT 6.4 12/27/2020    CALCIUM 8.9 12/28/2021    BILITOT 0.3 12/27/2020    ALKPHOS 90 12/27/2020    AST 21 12/27/2020    ALT 36 12/27/2020       POC Tests: No results for input(s): POCGLU, POCNA, POCK, POCCL, POCBUN, POCHEMO, POCHCT in the last 72 hours.     Coags:   Lab Results   Component Value Date    PROTIME 13.2 12/25/2020    INR 1.0 12/25/2020       HCG (If Applicable):   Lab Results   Component Value Date    PREGTESTUR neg 06/25/2017        ABGs: No results found for: PHART, PO2ART, UUC0XYU, JYX7SAL, BEART, X5IHZSCO     Type & Screen (If Applicable):  No results found for: LABABO, LABRH    Drug/Infectious Status (If Applicable):  No results found for: HIV, HEPCAB    COVID-19 Screening (If Applicable):   Lab Results   Component Value Date    COVID19 Not Detected 12/28/2021           Anesthesia Evaluation  Patient summary reviewed and Nursing notes reviewed no history of anesthetic complications:   Airway: Mallampati: II  TM distance: >3 FB   Neck ROM: full  Mouth opening: > = 3 FB Dental: normal exam         Pulmonary:Negative Pulmonary ROS and normal exam                               Cardiovascular:Negative CV ROS  Exercise tolerance: good (>4 METS),         ECG reviewed               Beta Blocker:  Not on Beta Blocker         Neuro/Psych:   Negative Neuro/Psych ROS  (+) psychiatric history:depression/anxiety             GI/Hepatic/Renal: Neg GI/Hepatic/Renal ROS  (+) hepatitis: C, liver disease:,           Endo/Other: Negative Endo/Other ROS             Pt had PAT visit. Abdominal:             Vascular: negative vascular ROS. Other Findings:             Anesthesia Plan      MAC and regional     ASA 2       Induction: intravenous. MIPS: Postoperative opioids intended and Prophylactic antiemetics administered. Anesthetic plan and risks discussed with patient. Plan discussed with CRNA.     Attending anesthesiologist reviewed and agrees with Pre Eval content              Stanley Carr MD   1/3/2022

## 2022-01-25 ENCOUNTER — OFFICE VISIT (OUTPATIENT)
Dept: ORTHOPEDIC SURGERY | Age: 32
End: 2022-01-25

## 2022-01-25 ENCOUNTER — HOSPITAL ENCOUNTER (OUTPATIENT)
Dept: ORTHOPEDIC SURGERY | Age: 32
Discharge: HOME | End: 2022-01-27
Payer: MEDICAID

## 2022-01-25 VITALS
HEIGHT: 66 IN | WEIGHT: 170 LBS | TEMPERATURE: 97 F | BODY MASS INDEX: 27.32 KG/M2 | HEART RATE: 74 BPM | OXYGEN SATURATION: 98 %

## 2022-01-25 DIAGNOSIS — S82.892A CLOSED FRACTURE OF LEFT ANKLE, INITIAL ENCOUNTER: ICD-10-CM

## 2022-01-25 DIAGNOSIS — S82.892A CLOSED FRACTURE OF LEFT ANKLE, INITIAL ENCOUNTER: Primary | ICD-10-CM

## 2022-01-25 PROCEDURE — 73610 X-RAY EXAM OF ANKLE: CPT | Performed by: ORTHOPAEDIC SURGERY

## 2022-01-25 PROCEDURE — 73610 X-RAY EXAM OF ANKLE: CPT

## 2022-01-25 PROCEDURE — 99024 POSTOP FOLLOW-UP VISIT: CPT | Performed by: PHYSICIAN ASSISTANT

## 2022-01-25 NOTE — PROGRESS NOTES
[facility]      Subjective:      Chief Complaint   Patient presents with    Post-Op Check     Left ORIF of the left ankle on 1/3/22. The patient rates her pain as a 3/10 today. She also states that her foot feels tingly. HPI: Trevor Espinoza is a 33 y.o. female who is here 2 weeks after a posterior malleolar fixation by Dr. Yvon Coelho. Has been nonweightbearing. Incision is healing well, no signs of infection.     Past Medical History:   Diagnosis Date    Hepatitis C 12/25/2014    Polysubstance abuse (HCC)     Hx opiates, cocaine, ETOH    Restless legs syndrome (RLS)       Past Surgical History:   Procedure Laterality Date    ANKLE FRACTURE SURGERY Left 1/3/2022    OPEN REDUCTION INTERNAL FIXATION LEFT POSTERIOR MALLEOLUS FRACTURE performed by Justin Torre MD at [facility] History     Socioeconomic History    Marital status: Single     Spouse name: Not on file    Number of children: Not on file    Years of education: Not on file    Highest education level: Not on file   Occupational History    Not on file   Tobacco Use    Smoking status: Current Every Day Smoker     Packs/day: 1.00     Years: 15.00     Pack years: 15.00     Types: Cigarettes     Start date: 6/5/2005    Smokeless tobacco: Never Used   Substance and Sexual Activity    Alcohol use: No     Alcohol/week: 12.0 standard drinks     Types: 12 Cans of beer per week     Comment: daily    Drug use: No     Types: Marijuana ([place] Emperor), IV, Cocaine, Opiates      Comment: weed daily    Sexual activity: Yes     Partners: Male   Other Topics Concern    Not on file   Social History Narrative    Not on file     Social Determinants of Health     Financial Resource Strain: Low Risk     Difficulty of Paying Living Expenses: Not hard at all   Food Insecurity: No Food Insecurity    Worried About [address] [employer] in the Last Year: Never true    [place] of Food in the Last Year: Never true   Transportation Needs:     Lack of Transportation (Medical): Not on file    Lack of Transportation (Non-Medical): Not on file   Physical Activity:     Days of Exercise per Week: Not on file    Minutes of Exercise per Session: Not on file   Stress:     Feeling of Stress : Not on file   Social Connections:     Frequency of Communication with Friends and Family: Not on file    Frequency of Social Gatherings with Friends and Family: Not on file    Attends Yazidi Services: Not on file    Active Member of [address] [place] [address] [employer] or Organizations: Not on file    Attends Club or Organization Meetings: Not on file    Marital Status: Not on file   Intimate Partner Violence:     Fear of Current or Ex-Partner: Not on file    Emotionally Abused: Not on file    Physically Abused: Not on file    Sexually Abused: Not on file   Housing Stability:     Unable to Pay for Housing in the Last Year: Not on file    Number of [place] in the Last Year: Not on file    Unstable Housing in the Last Year: Not on file     No family history on file. Allergies   Allergen Reactions    No Known Allergies Other (See Comments)     Current Outpatient Medications on File Prior to Visit   Medication Sig Dispense Refill    aspirin 325 MG EC tablet Take 1 tablet by mouth daily 30 tablet 3     No current facility-administered medications on file prior to visit. Objective:   Pulse 74   Temp 97 °F (36.1 °C) (Temporal)   Ht 5' 6\" (1.676 m)   Wt 170 lb (77.1 kg)   SpO2 98%   BMI 27.44 kg/m²       Radiographs and Laboratory Studies:   Previous diagnostic imaging studies were reviewed. Laboratory Studies:   Lab Results   Component Value Date    WBC 9.2 12/28/2021    HGB 14.6 12/28/2021    HCT 44.7 12/28/2021    .9 (H) 12/28/2021     12/28/2021     No results found for: SEDRATE  No results found for: CRP    Assessment and Plan:      Diagnosis Orders   1.  Closed fracture of left ankle, initial encounter  XR ANKLE LEFT (MIN 3 VIEWS)       2 weeks since a fixation of the posterior malleolus on the left side by Dr. Jorge Luis East. Incision is healing well, no signs of infection. No erythema or discharge. She has been nonweightbearing. She does have some numbness in a few of her toes, there is significant swelling there and is likely irritating her since her sensory nerves and should return over the course the next month. For now we will continue with nonweightbearing. She understands that weightbearing restrictions are critical and could  Result in displacement of her hardware and fracture which could result in another surgery if she were to go against them. She can start some gentle range of motion exercises of that ankle but no resistance training. We will see her back in 3 to 4 weeks for another x-ray with Dr. Jorge Luis East is here and to likely start therapy with weightbearing     The above plan was discussed in thorough detail with Dr. Leon Rolon and the patient. No orders of the defined types were placed in this encounter. No orders of the defined types were placed in this encounter. No follow-ups on file.     [facility]  [phone]

## 2022-01-28 ENCOUNTER — OFFICE VISIT (OUTPATIENT)
Dept: FAMILY MEDICINE CLINIC | Age: 32
End: 2022-01-28
Payer: MEDICAID

## 2022-01-28 VITALS
BODY MASS INDEX: 27.44 KG/M2 | DIASTOLIC BLOOD PRESSURE: 78 MMHG | HEART RATE: 75 BPM | OXYGEN SATURATION: 100 % | SYSTOLIC BLOOD PRESSURE: 118 MMHG | RESPIRATION RATE: 16 BRPM | TEMPERATURE: 96.4 F | HEIGHT: 66 IN

## 2022-01-28 DIAGNOSIS — S82.872D CLOSED FRACTURE OF LEFT TIBIAL PLAFOND WITH FIBULA INVOLVEMENT WITH ROUTINE HEALING, SUBSEQUENT ENCOUNTER: ICD-10-CM

## 2022-01-28 DIAGNOSIS — S82.832D CLOSED FRACTURE OF LEFT TIBIAL PLAFOND WITH FIBULA INVOLVEMENT WITH ROUTINE HEALING, SUBSEQUENT ENCOUNTER: ICD-10-CM

## 2022-01-28 DIAGNOSIS — G25.81 RESTLESS LEG: ICD-10-CM

## 2022-01-28 DIAGNOSIS — G25.81 RESTLESS LEG: Primary | ICD-10-CM

## 2022-01-28 PROCEDURE — 4004F PT TOBACCO SCREEN RCVD TLK: CPT | Performed by: PHYSICIAN ASSISTANT

## 2022-01-28 PROCEDURE — G8419 CALC BMI OUT NRM PARAM NOF/U: HCPCS | Performed by: PHYSICIAN ASSISTANT

## 2022-01-28 PROCEDURE — G8484 FLU IMMUNIZE NO ADMIN: HCPCS | Performed by: PHYSICIAN ASSISTANT

## 2022-01-28 PROCEDURE — G8427 DOCREV CUR MEDS BY ELIG CLIN: HCPCS | Performed by: PHYSICIAN ASSISTANT

## 2022-01-28 PROCEDURE — 99214 OFFICE O/P EST MOD 30 MIN: CPT | Performed by: PHYSICIAN ASSISTANT

## 2022-01-28 RX ORDER — GABAPENTIN 300 MG/1
300 CAPSULE ORAL NIGHTLY
Qty: 90 CAPSULE | Refills: 0 | Status: SHIPPED | OUTPATIENT
Start: 2022-01-28 | End: 2022-04-28

## 2022-01-28 ASSESSMENT — ENCOUNTER SYMPTOMS
ABDOMINAL PAIN: 0
SHORTNESS OF BREATH: 0
COLOR CHANGE: 0
COUGH: 0
ABDOMINAL DISTENTION: 0
CHEST TIGHTNESS: 0
WHEEZING: 0

## 2022-01-28 NOTE — PROGRESS NOTES
Subjective  Kristy Border, 33 y.o. female presents today with:  Chief Complaint   Patient presents with    New Patient     no previous PCP disucss restless legs that worsened since breaking leg      HPI  Would like to establish. Recent history of L ankle fracture from a fall at home. She had ORIF of L ankle with Dr. Mihaela Piedra on 1/3/22. Most recent follow up was on 1/25 with Mckayla Rutledge PA-C. Remains non-weight bearing. Denies worsening pain. History of restless leg. Would like to discuss going back on gabapentin at night for symptoms. Feels like she constantly has to move feet. This is worse at night. Most recent labs:     Results for orders placed or performed during the hospital encounter of 01/03/22   URINE DRUG SCREEN   Result Value Ref Range    Amphetamine Screen, Urine Neg Negative <1000 ng/mL    Barbiturate Screen, Ur Neg Negative < 200 ng/mL    Benzodiazepine Screen, Urine Neg Negative < 200 ng/mL    Cannabinoid Scrn, Ur POSITIVE (A) Negative < 50 ng/mL    Cocaine Metabolite Screen, Urine POSITIVE (A) Negative < 300 ng/mL    Opiate Scrn, Ur Neg Negative < 300 ng/mL    PCP Screen, Urine Neg Negative < 25 ng/mL    Methadone Screen, Urine Neg Negative <300 ng/mL    Propoxyphene Scrn, Ur Neg Negative <300 ng/mL    Oxycodone Urine Neg Negative <100 ng/mL    Drug Screen Comment: see below    POC Pregnancy Urine Qual   Result Value Ref Range    HCG, Urine, POC Negative Negative    Lot Number [id number]     Positive QC Pass/Fail Pass     Negative QC Pass/Fail Pass      Review of Systems   Constitutional: Positive for activity change. Negative for chills, diaphoresis and fatigue. HENT: Negative for congestion. Eyes: Negative for visual disturbance. Respiratory: Negative for cough, chest tightness, shortness of breath and wheezing. Cardiovascular: Negative for chest pain, palpitations and leg swelling. Gastrointestinal: Negative for abdominal distention and abdominal pain. Musculoskeletal: Positive for arthralgias and gait problem. Skin: Negative for color change. Neurological: Negative for dizziness and light-headedness. Psychiatric/Behavioral: Negative for dysphoric mood and sleep disturbance. The patient is not nervous/anxious. Past Medical History:   Diagnosis Date    Hepatitis C 12/25/2014    Polysubstance abuse (HCC)     Hx opiates, cocaine, ETOH    Restless legs syndrome (RLS)      Past Surgical History:   Procedure Laterality Date    ANKLE FRACTURE SURGERY Left 1/3/2022    OPEN REDUCTION INTERNAL FIXATION LEFT POSTERIOR MALLEOLUS FRACTURE performed by Rubén Granados MD at [address] [facility] History     Socioeconomic History    Marital status: Single     Spouse name: Not on file    Number of children: Not on file    Years of education: Not on file    Highest education level: Not on file   Occupational History    Not on file   Tobacco Use    Smoking status: Current Every Day Smoker     Packs/day: 1.00     Years: 15.00     Pack years: 15.00     Types: Cigarettes     Start date: 6/5/2005    Smokeless tobacco: Never Used   Substance and Sexual Activity    Alcohol use: No     Alcohol/week: 12.0 standard drinks     Types: 12 Cans of beer per week     Comment: daily    Drug use: No     Types: Marijuana (Rahel Gloss), IV, Cocaine, Opiates      Comment: weed daily    Sexual activity: Yes     Partners: Male   Other Topics Concern    Not on file   Social History Narrative    Not on file     Social Determinants of Health     Financial Resource Strain: Low Risk     Difficulty of Paying Living Expenses: Not hard at all   Food Insecurity: No Food Insecurity    Worried About Running Out of Food in the Last Year: Never true    [place] of Food in the Last Year: Never true   Transportation Needs:     Lack of Transportation (Medical): Not on file    Lack of Transportation (Non-Medical):  Not on file   Physical Activity:     Days of Exercise per Week: Not on file    Minutes of Exercise per Session: Not on file   Stress:     Feeling of Stress : Not on file   Social Connections:     Frequency of Communication with Friends and Family: Not on file    Frequency of Social Gatherings with Friends and Family: Not on file    Attends Jew Services: Not on file    Active Member of Clubs or Organizations: Not on file    Attends Club or Organization Meetings: Not on file    Marital Status: Not on file   Intimate Partner Violence:     Fear of Current or Ex-Partner: Not on file    Emotionally Abused: Not on file    Physically Abused: Not on file    Sexually Abused: Not on file   Housing Stability:     Unable to Pay for Housing in the Last Year: Not on file    Number of [place] in the Last Year: Not on file    Unstable Housing in the Last Year: Not on file     History reviewed. No pertinent family history. Allergies   Allergen Reactions    No Known Allergies Other (See Comments)     Current Outpatient Medications   Medication Sig Dispense Refill    gabapentin (NEURONTIN) 300 MG capsule Take 1 capsule by mouth nightly for 90 days. 90 capsule 0     No current facility-administered medications for this visit. PMH, Surgical Hx, Family Hx, and Social Hx reviewed and updated. Health Maintenance reviewed. Objective  Vitals:    01/28/22 1035   BP: 118/78   Site: Left Lower Arm   Position: Sitting   Cuff Size: Medium Adult   Pulse: 75   Resp: 16   Temp: 96.4 °F (35.8 °C)   TempSrc: Temporal   SpO2: 100%   Height: 5' 6\" (1.676 m)     BP Readings from Last 3 Encounters:   01/28/22 118/78   01/03/22 (!) 100/59   01/03/22 103/63     Wt Readings from Last 3 Encounters:   01/25/22 170 lb (77.1 kg)   01/03/22 170 lb (77.1 kg)   12/29/21 170 lb (77.1 kg)     Physical Exam  Vitals reviewed. HENT:      Head: Normocephalic and atraumatic.       Right Ear: External ear normal.      Left Ear: External ear normal.      Nose: Nose normal.   Cardiovascular:      Rate and Rhythm: Normal rate and regular rhythm. Pulmonary:      Effort: Pulmonary effort is normal.      Breath sounds: Normal breath sounds. Musculoskeletal:         General: Signs of injury present. Right lower leg: No edema. Left lower leg: No edema. Comments: Post-op boot on LLE noted   Skin:     General: Skin is warm and dry. Neurological:      General: No focal deficit present. Mental Status: She is alert and oriented to person, place, and time. Psychiatric:         Mood and Affect: Mood normal.         Thought Content: Thought content normal.         Judgment: Judgment normal.       Assessment & Plan   [facility] was [facility] today for new patient. Diagnoses and all orders for this visit:    Restless leg  -     Vitamin D 25 Hydroxy; Future  -     Vitamin B12 & Folate; Future  -     gabapentin (NEURONTIN) 300 MG capsule; Take 1 capsule by mouth nightly for 90 days. Closed fracture of left tibial plafond with fibula involvement with routine healing, subsequent encounter    Labs today for further evaluation. Continue ortho follow up. Contact office with any questions or concerns. Orders Placed This Encounter   Procedures    Vitamin D 25 Hydroxy     Standing Status:   Future     Number of Occurrences:   1     Standing Expiration Date:   1/28/2023    Vitamin B12 & Folate     Standing Status:   Future     Number of Occurrences:   1     Standing Expiration Date:   1/28/2023     Orders Placed This Encounter   Medications    gabapentin (NEURONTIN) 300 MG capsule     Sig: Take 1 capsule by mouth nightly for 90 days. Dispense:  90 capsule     Refill:  0     Medications Discontinued During This Encounter   Medication Reason    aspirin 325 MG EC tablet LIST CLEANUP     No follow-ups on file. Reviewed with the patient: current clinical status, medications, activities and diet.      Side effects, adverse effects of the medication prescribed today, as well as treatment plan/ rationale and result expectations have been discussed with the patient who expresses understanding and desires to proceed. Close follow up to evaluate treatment results and for coordination of care. I have reviewed the patient's medical history in detail and updated the computerized patient record.     Nina Le PA-C

## 2022-01-29 LAB
FOLATE: 8.3 NG/ML
VITAMIN B-12: 390 PG/ML (ref 232–1245)
VITAMIN D 25-HYDROXY: 20.9 NG/ML (ref 30–100)

## 2022-02-02 DIAGNOSIS — E55.9 VITAMIN D DEFICIENCY: Primary | ICD-10-CM

## 2022-02-02 RX ORDER — IRON HEME POLYPEPTIDE/FOLIC AC 12-1MG
5000 TABLET ORAL DAILY
Qty: 90 CAPSULE | Refills: 1 | Status: SHIPPED | OUTPATIENT
Start: 2022-02-02

## 2022-02-21 ENCOUNTER — HOSPITAL ENCOUNTER (OUTPATIENT)
Dept: ORTHOPEDIC SURGERY | Age: 32
Discharge: HOME | End: 2022-02-23
Payer: MEDICAID

## 2022-02-21 ENCOUNTER — OFFICE VISIT (OUTPATIENT)
Dept: ORTHOPEDIC SURGERY | Age: 32
End: 2022-02-21
Payer: MEDICAID

## 2022-02-21 VITALS
OXYGEN SATURATION: 97 % | TEMPERATURE: 97 F | HEART RATE: 102 BPM | WEIGHT: 170 LBS | HEIGHT: 66 IN | BODY MASS INDEX: 27.32 KG/M2

## 2022-02-21 DIAGNOSIS — S82.892A CLOSED FRACTURE OF LEFT ANKLE, INITIAL ENCOUNTER: ICD-10-CM

## 2022-02-21 DIAGNOSIS — S82.872D CLOSED DISPLACED PILON FRACTURE OF LEFT TIBIA WITH ROUTINE HEALING, SUBSEQUENT ENCOUNTER: Primary | ICD-10-CM

## 2022-02-21 PROCEDURE — 99024 POSTOP FOLLOW-UP VISIT: CPT | Performed by: ORTHOPAEDIC SURGERY

## 2022-02-21 PROCEDURE — 73610 X-RAY EXAM OF ANKLE: CPT

## 2022-02-21 PROCEDURE — 73610 X-RAY EXAM OF ANKLE: CPT | Performed by: ORTHOPAEDIC SURGERY

## 2022-02-21 NOTE — PROGRESS NOTES
Subjective:      Patient ID: Shwetha Eduardo is a 33 y.o. female who presents today for:  Chief Complaint   Patient presents with    Follow-up     Sx on 1/3/22 for left ankle fx. The patient states that her ankle is doing well. She denies having pain, but continues to have numbness and tingling. HPI  Patient doing okay overall. Reports that symptoms appear to be improving overall. As swelling is decreased the tingling specifically in the big toe continues to improve. Patient denies any new trauma. Has been maintaining nonweightbearing precautions but has been balancing when standing in the kitchen putting some minimal pressure onto the leg with the walking boot.     Past Medical History:   Diagnosis Date    Hepatitis C 12/25/2014    Polysubstance abuse (HCC)     Hx opiates, cocaine, ETOH    Restless legs syndrome (RLS)       Past Surgical History:   Procedure Laterality Date    ANKLE FRACTURE SURGERY Left 1/3/2022    OPEN REDUCTION INTERNAL FIXATION LEFT POSTERIOR MALLEOLUS FRACTURE performed by Roger Garcia MD at [address] [facility] History     Socioeconomic History    Marital status: Single     Spouse name: Not on file    Number of children: Not on file    Years of education: Not on file    Highest education level: Not on file   Occupational History    Not on file   Tobacco Use    Smoking status: Current Every Day Smoker     Packs/day: 1.00     Years: 15.00     Pack years: 15.00     Types: Cigarettes     Start date: 6/5/2005    Smokeless tobacco: Never Used   Substance and Sexual Activity    Alcohol use: No     Alcohol/week: 12.0 standard drinks     Types: 12 Cans of beer per week     Comment: daily    Drug use: No     Types: Marijuana (Weed), IV, Cocaine, Opiates      Comment: weed daily    Sexual activity: Yes     Partners: Male   Other Topics Concern    Not on file   Social History Narrative    Not on file     Social Determinants of Health     Financial Resource Strain: Low Risk     Difficulty of Paying Living Expenses: Not hard at all   Food Insecurity: No Food Insecurity    Worried About Running Out of Food in the Last Year: Never true    Kannan of Food in the Last Year: Never true   Transportation Needs:     Lack of Transportation (Medical): Not on file    Lack of Transportation (Non-Medical): Not on file   Physical Activity:     Days of Exercise per Week: Not on file    Minutes of Exercise per Session: Not on file   Stress:     Feeling of Stress : Not on file   Social Connections:     Frequency of Communication with Friends and Family: Not on file    Frequency of Social Gatherings with Friends and Family: Not on file    Attends Alevism Services: Not on file    Active Member of [address] [place] [address] [employer] or Organizations: Not on file    Attends Club or Organization Meetings: Not on file    Marital Status: Not on file   Intimate Partner Violence:     Fear of Current or Ex-Partner: Not on file    Emotionally Abused: Not on file    Physically Abused: Not on file    Sexually Abused: Not on file   Housing Stability:     Unable to Pay for Housing in the Last Year: Not on file    Number of [place] in the Last Year: Not on file    Unstable Housing in the Last Year: Not on file     No family history on file. Allergies   Allergen Reactions    No Known Allergies Other (See Comments)     Current Outpatient Medications on File Prior to Visit   Medication Sig Dispense Refill    vitamin D (DIALYVITE VITAMIN D 5000) 125 MCG (5000 UT) CAPS capsule Take 1 capsule by mouth daily 90 capsule 1    gabapentin (NEURONTIN) 300 MG capsule Take 1 capsule by mouth nightly for 90 days. 90 capsule 0     No current facility-administered medications on file prior to visit.          Review of Systems      Objective:   Pulse 102   Temp 97 °F (36.1 °C) (Temporal)   Ht 5' 6\" (1.676 m)   Wt 170 lb (77.1 kg)   SpO2 97%   BMI 27.44 kg/m²     Ortho Exam  Left lower extremity: Patient has well-healing posterior lateral surgical incision about the left leg. Patient has expected stiffness in the ankle but is able obtain a plantigrade foot to 5 degrees of dorsiflexion and approximately 30 degrees of plantarflexion. Sensation is intact light touch in superficial peroneal, deep peroneal, sural and tibial nerve distribution. Some vague decreased distribution over the tip of the great toe. Motors lesser toes appropriately without any clawing appreciated. Radiographs and Laboratory Studies:     Diagnostic Imaging Studies:    AP, oblique and lateral imaging of the left ankle was obtained on 2/21/2022 to evaluate for progressive healing of ankle fracture    Radiographs demonstrate maintenance of reduction fixation of posterior pilon ankle fracture with progressive healing appreciated. Laboratory Studies:   Lab Results   Component Value Date    WBC 9.2 12/28/2021    HGB 14.6 12/28/2021    HCT 44.7 12/28/2021    .9 (H) 12/28/2021     12/28/2021     No results found for: SEDRATE  No results found for: CRP    Assessment:      2-month status post ORIF left ankle Pilon fracture     Plan:     Patient doing very well overall. We will transition patient to 50% weightbearing in the walking boot on the left lower extremity. Over the following 2 weeks may transition to weightbearing as tolerated in the walking boot. We will see patient back in 1 month for repeat radiographs of the left ankle.     Orders Placed This Encounter   Procedures    XR ANKLE LEFT (MIN 3 VIEWS)     Standing Status:   Future     Standing Expiration Date:   2/21/2023     Scheduling Instructions:      AP, Mortise, Lateral     Order Specific Question:   Reason for exam:     Answer:   post op fracture    XR ANKLE LEFT (MIN 3 VIEWS)     Standing Status:   Future     Standing Expiration Date:   2/21/2023     Scheduling Instructions:      AP, Mortise, Lateral     Order Specific Question:   Reason for exam:     Answer:   post op fracture         Curt Ram MD

## 2022-03-21 ENCOUNTER — HOSPITAL ENCOUNTER (OUTPATIENT)
Dept: ORTHOPEDIC SURGERY | Age: 32
Discharge: HOME | End: 2022-03-23
Payer: MEDICAID

## 2022-03-21 ENCOUNTER — OFFICE VISIT (OUTPATIENT)
Dept: ORTHOPEDIC SURGERY | Age: 32
End: 2022-03-21
Payer: MEDICAID

## 2022-03-21 VITALS
OXYGEN SATURATION: 98 % | TEMPERATURE: 97 F | WEIGHT: 170 LBS | HEIGHT: 66 IN | HEART RATE: 88 BPM | BODY MASS INDEX: 27.32 KG/M2

## 2022-03-21 DIAGNOSIS — S82.832D CLOSED FRACTURE OF LEFT TIBIAL PLAFOND WITH FIBULA INVOLVEMENT WITH ROUTINE HEALING, SUBSEQUENT ENCOUNTER: Primary | ICD-10-CM

## 2022-03-21 DIAGNOSIS — S82.872D CLOSED FRACTURE OF LEFT TIBIAL PLAFOND WITH FIBULA INVOLVEMENT WITH ROUTINE HEALING, SUBSEQUENT ENCOUNTER: Primary | ICD-10-CM

## 2022-03-21 DIAGNOSIS — S82.892A CLOSED FRACTURE OF LEFT ANKLE, INITIAL ENCOUNTER: ICD-10-CM

## 2022-03-21 PROCEDURE — 73610 X-RAY EXAM OF ANKLE: CPT

## 2022-03-21 PROCEDURE — 99024 POSTOP FOLLOW-UP VISIT: CPT | Performed by: ORTHOPAEDIC SURGERY

## 2022-03-21 PROCEDURE — 73610 X-RAY EXAM OF ANKLE: CPT | Performed by: ORTHOPAEDIC SURGERY

## 2022-03-21 NOTE — PROGRESS NOTES
Subjective:      Patient ID: Emir Lozoya is a 33 y.o. female who presents today for:  Follow-up status post open reduction internal fixation of left ankle pilon fracture    HPI  Patient doing okay overall. States she essentially has very little to no pain. Has been ambulating well with walking boot outside. In the house she has been removing the boot and focusing on range of motion has been ambulating on this in the house setting. Denies any new trauma or falls.     Past Medical History:   Diagnosis Date    Hepatitis C 12/25/2014    Polysubstance abuse (HCC)     Hx opiates, cocaine, ETOH    Restless legs syndrome (RLS)       Past Surgical History:   Procedure Laterality Date    ANKLE FRACTURE SURGERY Left 1/3/2022    OPEN REDUCTION INTERNAL FIXATION LEFT POSTERIOR MALLEOLUS FRACTURE performed by Renuka Aguirre MD at [facility] History     Socioeconomic History    Marital status: Single     Spouse name: Not on file    Number of children: Not on file    Years of education: Not on file    Highest education level: Not on file   Occupational History    Not on file   Tobacco Use    Smoking status: Current Every Day Smoker     Packs/day: 1.00     Years: 15.00     Pack years: 15.00     Types: Cigarettes     Start date: 6/5/2005    Smokeless tobacco: Never Used   Substance and Sexual Activity    Alcohol use: No     Alcohol/week: 12.0 standard drinks     Types: 12 Cans of beer per week     Comment: daily    Drug use: No     Types: Marijuana (Preston Gain), IV, Cocaine, Opiates      Comment: weed daily    Sexual activity: Yes     Partners: Male   Other Topics Concern    Not on file   Social History Narrative    Not on file     Social Determinants of Health     Financial Resource Strain: Low Risk     Difficulty of Paying Living Expenses: Not hard at all   Food Insecurity: No Food Insecurity    Worried About Running Out of Food in the Last Year: Never true    [address] in the Last Year: Never true   Transportation Needs:     Lack of Transportation (Medical): Not on file    Lack of Transportation (Non-Medical): Not on file   Physical Activity:     Days of Exercise per Week: Not on file    Minutes of Exercise per Session: Not on file   Stress:     Feeling of Stress : Not on file   Social Connections:     Frequency of Communication with Friends and Family: Not on file    Frequency of Social Gatherings with Friends and Family: Not on file    Attends Synagogue Services: Not on file    Active Member of [address] [place] [address] [employer] or Organizations: Not on file    Attends Club or Organization Meetings: Not on file    Marital Status: Not on file   Intimate Partner Violence:     Fear of Current or Ex-Partner: Not on file    Emotionally Abused: Not on file    Physically Abused: Not on file    Sexually Abused: Not on file   Housing Stability:     Unable to Pay for Housing in the Last Year: Not on file    Number of [place] in the Last Year: Not on file    Unstable Housing in the Last Year: Not on file     No family history on file. Allergies   Allergen Reactions    No Known Allergies Other (See Comments)     Current Outpatient Medications on File Prior to Visit   Medication Sig Dispense Refill    vitamin D (DIALYVITE VITAMIN D 5000) 125 MCG (5000 UT) CAPS capsule Take 1 capsule by mouth daily 90 capsule 1    gabapentin (NEURONTIN) 300 MG capsule Take 1 capsule by mouth nightly for 90 days. 90 capsule 0     No current facility-administered medications on file prior to visit. Review of Systems      Objective: There were no vitals taken for this visit. Ortho Exam  Left lower extremity:  Skin: Intact circumferentially no edema or ecchymosis appreciated with well-healed incision about the posterior lateral aspect of the ankle. Neuro: Sensation is intact to light touch in the superficial peroneal, deep peroneal, tibial nerve distribution.   5 out of 5 strength in dorsiflexion at the ankle, plantarflexion at the ankle, extension of the big toe. Vascular: Patient demonstrates 2+ dorsalis pedis pulse and demonstrates 2+ posterior tibial pulse. MSK: Patient is nontender palpation diffusely throughout the ankle nontender palpation about the peroneal tendons. Negative heel squeeze, negative syndesmotic squeeze, no pain with abduction of the forefoot. Range of motion of the ankle is from 10 degrees of dorsiflexion approximately 50 degrees of plantarflexion. Approaching symmetry compared to the contralateral side. Radiographs and Laboratory Studies:     Diagnostic Imaging Studies:    AP, oblique and lateral imaging of the left ankle was obtained on 3/21/2022 to eval for progressive healing of left ankle pilon fracture    Radiographs demonstrate progressive in complete radiographic healing of left ankle pilon fracture. No evidence of hardware backout or failure. Laboratory Studies:   Lab Results   Component Value Date    WBC 9.2 12/28/2021    HGB 14.6 12/28/2021    HCT 44.7 12/28/2021    .9 (H) 12/28/2021     12/28/2021     No results found for: SEDRATE  No results found for: CRP    Assessment:      Healed left ankle pilon fracture     Plan:     Patient is healed her fracture uneventfully. Range of motion is already excellent. Patient may discontinue use of the walking boot and gradually resume normal shoewear. He may gradually return to all activity as tolerated. No formal follow-up is required from my standpoint.     Balbir Venegas MD

## 2022-07-14 NOTE — PROGRESS NOTES
Patient's last menstrual period was 01/26/2018 (approximate). Please reference prenatal and OB flow chart for further information  PT here today for routine prenatal care  Pt endorses fetal movement and denies loss of fluid, contractions or vaginal bleeding  Pt without complaints  ROS:  Pt denies headache, dysuria, nausea/vomiting  PE:  /70   Pulse 92   Wt 167 lb (75.8 kg)   LMP 01/26/2018 (Approximate)   BMI 26.95 kg/m²   Gen - Alert and oriented x 3  HEENT- NC/AT, CVS - RRR, Lungs - CTAB  Abd - FH Appropriate fetal growth  LE no edema  Reassuring fetal status at this time    Upon completion of the visit all questions were answered and the patients follow-up and testing schedule were reviewed.
[Time Spent: ___ minutes] : I have spent [unfilled] minutes of time on the encounter.

## 2022-12-22 ENCOUNTER — TELEPHONE (OUTPATIENT)
Dept: FAMILY MEDICINE CLINIC | Age: 32
End: 2022-12-22

## 2023-01-04 ENCOUNTER — HOSPITAL ENCOUNTER (INPATIENT)
Age: 33
LOS: 4 days | Discharge: HOME | DRG: 753 | End: 2023-01-08
Attending: PSYCHIATRY & NEUROLOGY | Admitting: PSYCHIATRY & NEUROLOGY
Payer: MEDICAID

## 2023-01-04 DIAGNOSIS — F32.2 SEVERE MAJOR DEPRESSION (HCC): Primary | ICD-10-CM

## 2023-01-04 LAB
ACETAMINOPHEN LEVEL: <5 UG/ML (ref 10–30)
ALBUMIN SERPL-MCNC: 4.5 G/DL (ref 3.5–4.6)
ALP BLD-CCNC: 114 U/L (ref 40–130)
ALT SERPL-CCNC: 23 U/L (ref 0–33)
AMPHETAMINE SCREEN, URINE: ABNORMAL
ANION GAP SERPL CALCULATED.3IONS-SCNC: 16 MEQ/L (ref 9–15)
AST SERPL-CCNC: 21 U/L (ref 0–35)
BARBITURATE SCREEN URINE: ABNORMAL
BASOPHILS ABSOLUTE: 0.1 K/UL (ref 0–0.2)
BASOPHILS RELATIVE PERCENT: 1 %
BENZODIAZEPINE SCREEN, URINE: ABNORMAL
BILIRUB SERPL-MCNC: <0.2 MG/DL (ref 0.2–0.7)
BILIRUBIN URINE: NEGATIVE
BLOOD, URINE: NEGATIVE
BUN BLDV-MCNC: 7 MG/DL (ref 6–20)
CALCIUM SERPL-MCNC: 9 MG/DL (ref 8.5–9.9)
CANNABINOID SCREEN URINE: ABNORMAL
CHLORIDE BLD-SCNC: 104 MEQ/L (ref 95–107)
CHOLESTEROL, TOTAL: 229 MG/DL (ref 0–199)
CLARITY: CLEAR
CO2: 21 MEQ/L (ref 20–31)
COCAINE METABOLITE SCREEN URINE: POSITIVE
COLOR: YELLOW
CREAT SERPL-MCNC: 0.57 MG/DL (ref 0.5–0.9)
EOSINOPHILS ABSOLUTE: 0 K/UL (ref 0–0.7)
EOSINOPHILS RELATIVE PERCENT: 0.5 %
ETHANOL PERCENT: 0.16 G/DL
ETHANOL: 186 MG/DL (ref 0–0.08)
FENTANYL SCREEN, URINE: ABNORMAL
GFR SERPL CREATININE-BSD FRML MDRD: >60 ML/MIN/{1.73_M2}
GLOBULIN: 2.9 G/DL (ref 2.3–3.5)
GLUCOSE BLD-MCNC: 82 MG/DL (ref 70–99)
GLUCOSE URINE: NEGATIVE MG/DL
HCG, URINE, POC: NEGATIVE
HCT VFR BLD CALC: 45.6 % (ref 37–47)
HDLC SERPL-MCNC: 62 MG/DL (ref 40–59)
HEMOGLOBIN: 15.5 G/DL (ref 12–16)
KETONES, URINE: NEGATIVE MG/DL
LDL CHOLESTEROL CALCULATED: 129 MG/DL (ref 0–129)
LEUKOCYTE ESTERASE, URINE: NEGATIVE
LYMPHOCYTES ABSOLUTE: 2.8 K/UL (ref 1–4.8)
LYMPHOCYTES RELATIVE PERCENT: 27.6 %
Lab: ABNORMAL
Lab: NORMAL
MCH RBC QN AUTO: 33.1 PG (ref 27–31.3)
MCHC RBC AUTO-ENTMCNC: 34 % (ref 33–37)
MCV RBC AUTO: 97.3 FL (ref 79.4–94.8)
METHADONE SCREEN, URINE: ABNORMAL
MONOCYTES ABSOLUTE: 0.5 K/UL (ref 0.2–0.8)
MONOCYTES RELATIVE PERCENT: 4.5 %
NEGATIVE QC PASS/FAIL: NORMAL
NEUTROPHILS ABSOLUTE: 6.7 K/UL (ref 1.4–6.5)
NEUTROPHILS RELATIVE PERCENT: 66.4 %
NITRITE, URINE: NEGATIVE
OPIATE SCREEN URINE: ABNORMAL
OXYCODONE URINE: ABNORMAL
PDW BLD-RTO: 13 % (ref 11.5–14.5)
PH UA: 5.5 (ref 5–9)
PHENCYCLIDINE SCREEN URINE: ABNORMAL
PLATELET # BLD: 269 K/UL (ref 130–400)
POSITIVE QC PASS/FAIL: NORMAL
POTASSIUM SERPL-SCNC: 3.9 MEQ/L (ref 3.4–4.9)
PROPOXYPHENE SCREEN: ABNORMAL
PROTEIN UA: NEGATIVE MG/DL
RBC # BLD: 4.68 M/UL (ref 4.2–5.4)
SALICYLATE, SERUM: <0.3 MG/DL (ref 15–30)
SARS-COV-2, NAAT: NOT DETECTED
SODIUM BLD-SCNC: 141 MEQ/L (ref 135–144)
SPECIFIC GRAVITY UA: 1.01 (ref 1–1.03)
TOTAL CK: 100 U/L (ref 0–170)
TOTAL PROTEIN: 7.4 G/DL (ref 6.3–8)
TRIGL SERPL-MCNC: 190 MG/DL (ref 0–150)
UROBILINOGEN, URINE: 0.2 E.U./DL
WBC # BLD: 10.1 K/UL (ref 4.8–10.8)

## 2023-01-04 PROCEDURE — 82077 ASSAY SPEC XCP UR&BREATH IA: CPT

## 2023-01-04 PROCEDURE — 80307 DRUG TEST PRSMV CHEM ANLYZR: CPT

## 2023-01-04 PROCEDURE — 87635 SARS-COV-2 COVID-19 AMP PRB: CPT

## 2023-01-04 PROCEDURE — 82550 ASSAY OF CK (CPK): CPT

## 2023-01-04 PROCEDURE — 36415 COLL VENOUS BLD VENIPUNCTURE: CPT

## 2023-01-04 PROCEDURE — 85025 COMPLETE CBC W/AUTO DIFF WBC: CPT

## 2023-01-04 PROCEDURE — 80061 LIPID PANEL: CPT

## 2023-01-04 PROCEDURE — 81003 URINALYSIS AUTO W/O SCOPE: CPT

## 2023-01-04 PROCEDURE — 6370000000 HC RX 637 (ALT 250 FOR IP): Performed by: PSYCHIATRY & NEUROLOGY

## 2023-01-04 PROCEDURE — 80179 DRUG ASSAY SALICYLATE: CPT

## 2023-01-04 PROCEDURE — 6370000000 HC RX 637 (ALT 250 FOR IP)

## 2023-01-04 PROCEDURE — 80053 COMPREHEN METABOLIC PANEL: CPT

## 2023-01-04 PROCEDURE — 80143 DRUG ASSAY ACETAMINOPHEN: CPT

## 2023-01-04 PROCEDURE — 99285 EMERGENCY DEPT VISIT HI MDM: CPT

## 2023-01-04 PROCEDURE — 1240000000 HC EMOTIONAL WELLNESS R&B

## 2023-01-04 RX ORDER — HYDROXYZINE PAMOATE 50 MG/1
50 CAPSULE ORAL EVERY 6 HOURS PRN
Status: DISCONTINUED | OUTPATIENT
Start: 2023-01-04 | End: 2023-01-08 | Stop reason: HOSPADM

## 2023-01-04 RX ORDER — BENZTROPINE MESYLATE 1 MG/ML
2 INJECTION INTRAMUSCULAR; INTRAVENOUS 2 TIMES DAILY PRN
Status: DISCONTINUED | OUTPATIENT
Start: 2023-01-04 | End: 2023-01-08 | Stop reason: HOSPADM

## 2023-01-04 RX ORDER — NICOTINE 21 MG/24HR
1 PATCH, TRANSDERMAL 24 HOURS TRANSDERMAL DAILY
Status: DISCONTINUED | OUTPATIENT
Start: 2023-01-04 | End: 2023-01-08 | Stop reason: HOSPADM

## 2023-01-04 RX ORDER — ACETAMINOPHEN 325 MG/1
650 TABLET ORAL EVERY 4 HOURS PRN
Status: DISCONTINUED | OUTPATIENT
Start: 2023-01-04 | End: 2023-01-08 | Stop reason: HOSPADM

## 2023-01-04 RX ORDER — TRAZODONE HYDROCHLORIDE 50 MG/1
50 TABLET ORAL NIGHTLY PRN
Status: DISCONTINUED | OUTPATIENT
Start: 2023-01-04 | End: 2023-01-06

## 2023-01-04 RX ORDER — HALOPERIDOL 5 MG/ML
5 INJECTION INTRAMUSCULAR EVERY 6 HOURS PRN
Status: DISCONTINUED | OUTPATIENT
Start: 2023-01-04 | End: 2023-01-08 | Stop reason: HOSPADM

## 2023-01-04 RX ORDER — HALOPERIDOL 5 MG/1
5 TABLET ORAL EVERY 6 HOURS PRN
Status: DISCONTINUED | OUTPATIENT
Start: 2023-01-04 | End: 2023-01-08 | Stop reason: HOSPADM

## 2023-01-04 RX ORDER — MAGNESIUM HYDROXIDE/ALUMINUM HYDROXICE/SIMETHICONE 120; 1200; 1200 MG/30ML; MG/30ML; MG/30ML
30 SUSPENSION ORAL PRN
Status: DISCONTINUED | OUTPATIENT
Start: 2023-01-04 | End: 2023-01-08 | Stop reason: HOSPADM

## 2023-01-04 RX ORDER — HYDROXYZINE HYDROCHLORIDE 50 MG/ML
50 INJECTION, SOLUTION INTRAMUSCULAR EVERY 6 HOURS PRN
Status: DISCONTINUED | OUTPATIENT
Start: 2023-01-04 | End: 2023-01-08 | Stop reason: HOSPADM

## 2023-01-04 RX ORDER — GABAPENTIN 100 MG/1
300 CAPSULE ORAL ONCE
Status: COMPLETED | OUTPATIENT
Start: 2023-01-04 | End: 2023-01-04

## 2023-01-04 RX ADMIN — GABAPENTIN 300 MG: 100 CAPSULE ORAL at 06:12

## 2023-01-04 RX ADMIN — HYDROXYZINE PAMOATE 50 MG: 50 CAPSULE ORAL at 22:04

## 2023-01-04 ASSESSMENT — SLEEP AND FATIGUE QUESTIONNAIRES
DO YOU HAVE DIFFICULTY SLEEPING: YES
DO YOU USE A SLEEP AID: NO
SLEEP PATTERN: DIFFICULTY FALLING ASLEEP
AVERAGE NUMBER OF SLEEP HOURS: 6

## 2023-01-04 ASSESSMENT — LIFESTYLE VARIABLES
HOW OFTEN DO YOU HAVE A DRINK CONTAINING ALCOHOL: 2-3 TIMES A WEEK
HOW MANY STANDARD DRINKS CONTAINING ALCOHOL DO YOU HAVE ON A TYPICAL DAY: 5 OR 6

## 2023-01-04 ASSESSMENT — PAIN - FUNCTIONAL ASSESSMENT
PAIN_FUNCTIONAL_ASSESSMENT: NONE - DENIES PAIN
PAIN_FUNCTIONAL_ASSESSMENT: NONE - DENIES PAIN

## 2023-01-04 ASSESSMENT — PATIENT HEALTH QUESTIONNAIRE - PHQ9
SUM OF ALL RESPONSES TO PHQ QUESTIONS 1-9: 14
SUM OF ALL RESPONSES TO PHQ QUESTIONS 1-9: 19

## 2023-01-04 NOTE — ED NOTES
Per Dr Kim Silva, admit to [address] No 1:1 ordered at this time. Bryan Baugh  01/04/23 6529

## 2023-01-04 NOTE — ED NOTES
Pt was given lunch at the bedside, pt is quiet and cooperative with no problems and no C/O any kind expressed     Luis Nichols, [facility]  01/04/23 5782

## 2023-01-04 NOTE — ED NOTES
Pt ate fair for breakfast drinking fluids with no problems and no C/O any kind expressed     Alejandro Lancaster RN  01/04/23 0427

## 2023-01-04 NOTE — ED NOTES
Pt arrived with complaints of depression and suicidal ideation. Pt stated that she has been having a lot go on at how and has been trying to get custody of one of her kids. Pt stated that things kind of built up and she began to get depressed and have suicidal thoughts. Pt stated that she did have a plan and that it was to take pills. Pt stated that it didn't matter what kind of pills that they were. Pt stated that she does have a previous history of a suicide attempt. Pt stated that she took a bunch of tylenol when that happened. Pt stated that once she got here and started to talking to people she started to feel better. Pt denies seeing or hearing things. Pt changed out of clothes and locked up. Labs obtained and sent. Pt is cooperative with staff. NAD noted at this time.  Will continue plan of care      Sujatha Prakash RN  01/04/23 2798

## 2023-01-04 NOTE — ED NOTES
Pt is aware of her being second to assess, explained the need for assessments to be completed then the on call psychiatrist called for her disposition, she understood the information given to her.      Lidia Colon RN  01/04/23 2052

## 2023-01-04 NOTE — ED PROVIDER NOTES
[address] [facility] ED  eMERGENCY dEPARTMENT eNCOUnter      Pt Name: Kvng Reed  MRN: [id number]  [place] 1990  Date of evaluation: 1/4/2023  Provider: Cleveland Post PA-C        HISTORY OF PRESENT ILLNESS    Kvng Reed is a 27 y.o. female per chart review has ah/o substance abuse, mood disorder. Patient presents emergency department with ongoing SI, states worse recently over the past several days. She endorses that she has a specific plan for how she would end her life, at this time refuses to trauma what it is. She denies recent self-harm. Reports history of suicide attempt approximately 2 years ago. Denies HI. Denies VAH. Reports drinking 3 beers this evening. Denies daily EtOH use. Denies tobacco, marijuana, other substance abuse. REVIEW OF SYSTEMS       Review of Systems   Psychiatric/Behavioral:  Positive for suicidal ideas. Negative for confusion, hallucinations and self-injury. The patient is not nervous/anxious and is not hyperactive. Except as noted above the remainder of the review of systems was reviewed and negative. PAST MEDICAL HISTORY     Past Medical History:   Diagnosis Date    Hepatitis C 12/25/2014    Polysubstance abuse (HCC)     Hx opiates, cocaine, ETOH    Restless legs syndrome (RLS)          SURGICAL HISTORY       Past Surgical History:   Procedure Laterality Date    ANKLE FRACTURE SURGERY Left 1/3/2022    OPEN REDUCTION INTERNAL FIXATION LEFT POSTERIOR MALLEOLUS FRACTURE performed by Salas Chopra MD at [facility]       Previous Medications    GABAPENTIN (NEURONTIN) 300 MG CAPSULE    Take 1 capsule by mouth nightly for 90 days. ALLERGIES     No known allergies    FAMILY HISTORY     History reviewed. No pertinent family history.        SOCIAL HISTORY       Social History     Socioeconomic History    Marital status: Single     Spouse name: None    Number of children: None    Years of education: None Highest education level: None   Tobacco Use    Smoking status: Every Day     Packs/day: 1.00     Years: 15.00     Pack years: 15.00     Types: Cigarettes     Start date: 6/5/2005    Smokeless tobacco: Never   Substance and Sexual Activity    Alcohol use: Yes     Alcohol/week: 12.0 standard drinks     Types: 12 Cans of beer per week     Comment: daily    Drug use: No     Types: Marijuana (Weed), IV, Cocaine, Opiates      Comment: weed daily    Sexual activity: Yes     Partners: Male         PHYSICAL EXAM        ED Triage Vitals [01/04/23 0329]   BP Temp Temp src Heart Rate Resp SpO2 Height Weight   111/67 97.5 °F (36.4 °C) -- 96 16 96 % 5' 6\" (1.676 m) 170 lb (77.1 kg)       Physical Exam  Constitutional:       General: She is not in acute distress. Appearance: Normal appearance. She is not ill-appearing, toxic-appearing or diaphoretic. HENT:      Head: Normocephalic and atraumatic. Right Ear: External ear normal.      Left Ear: External ear normal.      Nose: Nose normal.      Mouth/Throat:      Mouth: Mucous membranes are moist.      Pharynx: Oropharynx is clear. Eyes:      Extraocular Movements: Extraocular movements intact. Cardiovascular:      Rate and Rhythm: Normal rate and regular rhythm. Pulmonary:      Effort: Pulmonary effort is normal. No respiratory distress. Breath sounds: Normal breath sounds. Abdominal:      General: Bowel sounds are normal.      Palpations: Abdomen is soft. Tenderness: There is no abdominal tenderness. Musculoskeletal:         General: Normal range of motion. Cervical back: Normal range of motion. Skin:     General: Skin is warm. Neurological:      Mental Status: She is alert and oriented to person, place, and time. Psychiatric:         Attention and Perception: Attention normal.         Mood and Affect: Mood is depressed. Affect is flat. Speech: Speech normal.         Behavior: Behavior is agitated and withdrawn.          Thought Content: Thought content includes suicidal ideation. Thought content includes suicidal plan. LABS:  Labs Reviewed   CBC WITH AUTO DIFFERENTIAL - Abnormal; Notable for the following components:       Result Value    MCV 97.3 (*)     MCH 33.1 (*)     Neutrophils Absolute 6.7 (*)     All other components within normal limits   COMPREHENSIVE METABOLIC PANEL - Abnormal; Notable for the following components:    Anion Gap 16 (*)     All other components within normal limits   URINE DRUG SCREEN - Abnormal; Notable for the following components:    Cocaine Metabolite Screen, Urine POSITIVE (*)     All other components within normal limits   SALICYLATE LEVEL - Abnormal; Notable for the following components:    Salicylate, Serum <0.8 (*)     All other components within normal limits   ACETAMINOPHEN LEVEL - Abnormal; Notable for the following components:    Acetaminophen Level <5 (*)     All other components within normal limits   LIPID PANEL - Abnormal; Notable for the following components:    Cholesterol, Total 229 (*)     Triglycerides 190 (*)     HDL 62 (*)     All other components within normal limits   COVID-19, RAPID   ETHANOL   URINALYSIS   CK   POC PREGNANCY UR-QUAL         MDM:   Vitals:    Vitals:    01/04/23 0329 01/04/23 1106   BP: 111/67 (!) 108/56   Pulse: 96 90   Resp: 16 18   Temp: 97.5 °F (36.4 °C) 98.6 °F (37 °C)   TempSrc:  Oral   SpO2: 96% 94%   Weight: 170 lb (77.1 kg)    Height: 5' 6\" (1.676 m)        64-year-old female patient to the emergency department for evaluation of suicidal ideation with a plan. She is medically stable for behavioral health evaluation at this time. Patient was seen, evaluated and will be admitted by psychiatry for severe major depression. PROCEDURES:  Unlessotherwise noted below, none      Procedures      FINAL IMPRESSION      1.  Severe major depression [facility])          DISPOSITION/PLAN   DISPOSITION Decision To Admit 01/04/2023 05:34:06 PM          Lucy Cai DANIA (electronically signed)  Attending Emergency Physician          Hailee Thurston PA-C  01/04/23 0538

## 2023-01-04 NOTE — ED NOTES
Pt is in bed area quiet and cooperative with no problems and no C/O any kind expressed     Miguel Jerez RN  01/04/23 6734
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
## 2023-01-04 NOTE — ED NOTES
Pt was given breakfast at the bedside, pt is quiet and cooperative with no problems and no C/O any kind expressed     Viri Rios RN  01/04/23 0830

## 2023-01-04 NOTE — ED NOTES
Pt is here from main ER room 12 quiet and cooperative with no problems and no C/O any kind expressed     Radha Quach RN  01/04/23 8442
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
## 2023-01-04 NOTE — ED TRIAGE NOTES
Pt to ED due to suicidal ideation. Pt states she has been having suicidal ideation with a plan. Pt states she has been having SI for a year but has been getting worse. Pt states that she has been drinking tonight. Pt is alert and oriented x4. Skin is warm, dry, intact.  Resp are regular and equal.

## 2023-01-04 NOTE — ED NOTES
[employer] notified of need for [facility] & Patient belongings.      Yemi Mckeon RN  01/04/23 7891

## 2023-01-04 NOTE — ED NOTES
Pt is in bed area quiet and cooperative with no problems and no C/O any kind expressed     Shayy Peraza RN  01/04/23 1038

## 2023-01-05 PROCEDURE — 6370000000 HC RX 637 (ALT 250 FOR IP): Performed by: PSYCHIATRY & NEUROLOGY

## 2023-01-05 PROCEDURE — 93005 ELECTROCARDIOGRAM TRACING: CPT

## 2023-01-05 PROCEDURE — 1240000000 HC EMOTIONAL WELLNESS R&B

## 2023-01-05 PROCEDURE — 2500000003 HC RX 250 WO HCPCS: Performed by: REGISTERED NURSE

## 2023-01-05 RX ORDER — QUETIAPINE FUMARATE 50 MG/1
50 TABLET, FILM COATED ORAL NIGHTLY
Status: DISCONTINUED | OUTPATIENT
Start: 2023-01-05 | End: 2023-01-06

## 2023-01-05 RX ORDER — DIVALPROEX SODIUM 500 MG/1
500 TABLET, EXTENDED RELEASE ORAL DAILY
Status: DISCONTINUED | OUTPATIENT
Start: 2023-01-05 | End: 2023-01-08 | Stop reason: HOSPADM

## 2023-01-05 RX ADMIN — DIVALPROEX SODIUM 500 MG: 500 TABLET, EXTENDED RELEASE ORAL at 10:23

## 2023-01-05 RX ADMIN — HYDROXYZINE PAMOATE 50 MG: 50 CAPSULE ORAL at 11:46

## 2023-01-05 RX ADMIN — MICONAZOLE NITRATE: 2 POWDER TOPICAL at 13:00

## 2023-01-05 RX ADMIN — TRAZODONE HYDROCHLORIDE 50 MG: 50 TABLET ORAL at 02:03

## 2023-01-05 RX ADMIN — MICONAZOLE NITRATE: 2 POWDER TOPICAL at 21:16

## 2023-01-05 NOTE — CONSULTS
HISTORY AND PHYSICAL             Date: 1/5/2023        Patient Name: Abraham Larkin     YOB: 1990      Age:  27 y.o. Chief Complaint     Chief Complaint   Patient presents with    Suicidal     Suicidal ideation x1 year worsening tonight. History Obtained From   patient    History of Present Illness   Neal Vergara is a 41-year-old female who presented to the ED for suicide ideation. Patient has a history of substance abuse and mood disorder. Patient denies plan at this time. Patient denies smoking, illicit drug use, and alcohol abuse, she acknowledges drinking occasionally. Urine tox positive for cocaine. Patient denies chest pain, cough, shortness of breath, palpitations, dizziness, fever, chills, N/V/D, or changes in appetite. Patient complains of a rash that she has had on her groin area x1 week. Complains rash is red. Denies itchiness tingling. Denies concerns for STDs. Past Medical History     Past Medical History:   Diagnosis Date    Hepatitis C 12/25/2014    Polysubstance abuse (HCC)     Hx opiates, cocaine, ETOH    Restless legs syndrome (RLS)         Past Surgical History     Past Surgical History:   Procedure Laterality Date    ANKLE FRACTURE SURGERY Left 1/3/2022    OPEN REDUCTION INTERNAL FIXATION LEFT POSTERIOR MALLEOLUS FRACTURE performed by Reyna Grove MD at [address] [place] [address]          Medications Prior to Admission     Prior to Admission medications    Medication Sig Start Date End Date Taking? Authorizing Provider   gabapentin (NEURONTIN) 300 MG capsule Take 1 capsule by mouth nightly for 90 days.  1/28/22 4/28/22  Nina Le PA-C        Allergies   No known allergies    Social History     Social History       Tobacco History       Smoking Status  Every Day Smoking Start Date  6/5/2005 Smoking Frequency  1 pack/day for 15.00 years (15.00 pk-yrs) Smoking Tobacco Type  Cigarettes since 6/5/2005      Smokeless Tobacco Use  Never              Alcohol History Alcohol Use Status  Yes Drinks/Week  12 Cans of beer per week Amount  12.0 standard drinks of alcohol/wk Comment  daily              Drug Use       Drug Use Status  No Comment  weed daily              Sexual Activity       Sexually Active  Yes Partners  Male                    Family History   History reviewed. No pertinent family history. Review of Systems   12 point ROS reviewed and negative except as stated in HPI    Physical Exam   /60   Pulse 84   Temp 98.6 °F (37 °C) (Oral)   Resp 18   Ht 5' 6\" (1.676 m)   Wt 170 lb (77.1 kg)   SpO2 99%   BMI 27.44 kg/m²     CONSTITUTIONAL:  awake, alert, cooperative, no apparent distress, and appears stated age  NECK:  Supple, symmetrical, trachea midline, no adenopathy, thyroid symmetric, not enlarged and no tenderness, skin normal  LUNGS:  No increased work of breathing, good air exchange, clear to auscultation bilaterally, no crackles or wheezing  CARDIOVASCULAR:  Normal apical impulse, regular rate and rhythm, normal S1 and S2, no S3 or S4, and no murmur noted  ABDOMEN:  No scars, normal bowel sounds, soft, non-distended, non-tender, no masses palpated, no hepatosplenomegally  MUSCULOSKELETAL:  There is no redness, warmth, or swelling of the joints. Full range of motion noted. Motor strength is 5 out of 5 all extremities bilaterally. Tone is normal.  NEUROLOGIC:  Awake, alert, oriented to name, place and time. Cranial nerves II-XII are grossly intact. Motor is 5 out of 5 bilaterally. Cerebellar finger to nose, heel to shin intact. Sensory is intact. Babinski down going, Romberg negative, and gait is normal.  SKIN:  no bruising or bleeding, normal skin color, texture, turgor, and no redness, warmth, or swelling. Rash noted to groin area and abdominal fold. Labs    No results found for this or any previous visit (from the past 24 hour(s)). Imaging/Diagnostics Last 24 Hours   No results found.     Assessment      Hospital Problems Last Modified POA    * (Principal) Major depressive disorder, severe ([facility] [address] 1/4/2023 Yes       Plan   *Suicide ideation/major depressive disorder-managed by psychiatry  *Rash-miconazole powder twice daily    Patient medically appropriate for psych. Plan of care discussed with patient. Please consult for medical needs as needed. Time spent with patient 35 minutes.       Consultations Ordered:  IP CONSULT TO HOSPITALIST  IP CONSULT TO SOCIAL WORK    Electronically signed by OTTO Dean CNP on 1/5/23 at 10:22 AM EST

## 2023-01-05 NOTE — PROGRESS NOTES
Pt reports is prescribed Neurontin for restless leg syndrome,and is requesting Dr Marcial Terry reorder.  Encouraged pt to speak with  in AM.

## 2023-01-05 NOTE — GROUP NOTE
Group Therapy Note    Date: 1/5/2023    Group Start Time: 1245  Group End Time: 1330  Group Topic: Healthy Living/Wellness    [facility]    Manasa Billings RN        Group Therapy Note    Attendees: 14/23       Patient's Goal:  Discuss personal development and values     Notes:  Alert and appropriate.      Status After Intervention:  Unchanged    Participation Level: Interactive    Participation Quality: Appropriate and Attentive      Speech:  normal      Thought Process/Content: Logical  Linear      Affective Functioning: Congruent      Mood: euthymic      Level of consciousness:  Alert and Oriented x4      Response to Learning: Able to verbalize current knowledge/experience and Able to verbalize/acknowledge new learning      Endings: None Reported    Modes of Intervention: Support      Discipline Responsible: Registered Nurse      Signature:  Manasa Billings RN

## 2023-01-05 NOTE — PLAN OF CARE
Problem: Self Harm/Suicidality  Goal: Will have no self-injury during hospital stay  Description: INTERVENTIONS:  1. Ensure constant observer at bedside with Q15M safety checks  2. Maintain a safe environment  3. Secure patient belongings  4. Ensure family/visitors adhere to safety recommendations  5. Ensure safety tray has been added to patient's diet order  6. Every shift and PRN: Re-assess suicidal risk via Frequent Screener    1/5/2023 1155 by Rodo Hanna RN  Outcome: Progressing  Flowsheets (Taken 1/5/2023 1153)  Will have no self-injury during hospital stay: Maintain a safe environment  0/2/7784 4931 by Esperanza Grant RN  Outcome: Progressing     Problem: Anxiety  Goal: Will report anxiety at manageable levels  Description: INTERVENTIONS:  1. Administer medication as ordered  2. Teach and rehearse alternative coping skills  3. Provide emotional support with 1:1 interaction with staff  1/5/2023 1155 by Rodo Hanna RN  Outcome: Progressing  Flowsheets (Taken 1/5/2023 1153)  Will report anxiety at manageable levels:   Provide emotional support with 1:1 interaction with staff   Administer medication as ordered   Teach and rehearse alternative coping skills  5/5/5221 7211 by Esperanza Grant RN  Outcome: Progressing     Problem: Coping  Goal: Pt/Family able to verbalize concerns and demonstrate effective coping strategies  Description: INTERVENTIONS:  1. Assist patient/family to identify coping skills, available support systems and cultural and spiritual values  2. Provide emotional support, including active listening and acknowledgement of concerns of patient and caregivers  3. Reduce environmental stimuli, as able  4. Instruct patient/family in relaxation techniques, as appropriate  5.  Assess for spiritual pain/suffering and initiate Spiritual Care, Psychosocial Clinical Specialist consults as needed  1/5/2023 1155 by Rodo Hanna RN  Outcome: Progressing  Flowsheets (Taken 1/5/2023 1155)  Patient/family able to verbalize anxieties, fears, and concerns, and demonstrate effective coping: Reduce environmental stimuli, as able  9/7/9564 4343 by Aimee Adame RN  Outcome: Progressing     Problem: Decision Making  Goal: Pt/Family able to effectively weigh alternatives and participate in decision making related to treatment and care  Description: INTERVENTIONS:  1. Determine when there are differences between patient's view, family's view, and healthcare provider's view of condition  2. Facilitate patient and family articulation of goals for care  3. Help patient and family identify pros/cons of alternative solutions  4. Provide information as requested by patient/family  5. Respect patient/family right to receive or not to receive information  6. Serve as a liaison between patient and family and health care team  7. Initiate Consults from Ethics, Palliative Care or initiate [facility] as is appropriate  1/5/2023 1155 by Rafael Weiss RN  Outcome: Progressing  Flowsheets (Taken 1/5/2023 1153)  Patient/family able to effectively weigh alternatives and participate in decision making related to treatment and care: Facilitate patient and family articulation of goals for care   Provide information as requested by patient/family  9/5/1389 3325 by Aimee Adame RN  Outcome: Progressing     Problem: Depression/Self Harm  Goal: Effect of psychiatric condition will be minimized and patient will be protected from self harm  Description: INTERVENTIONS:  1. Assess impact of patient's symptoms on level of functioning, self care needs and offer support as indicated  2. Assess patient/family knowledge of depression, impact on illness and need for teaching  3. Provide emotional support, presence and reassurance  4. Assess for possible suicidal thoughts or ideation.  If patient expresses suicidal thoughts or statements do not leave alone, initiate Suicide Precautions, move to a room close to the nursing station and obtain chana  5. Initiate consults as appropriate with Mental Health Professional, Spiritual Care, Psychosocial CNS, and consider a recommendation to the LIP for a Psychiatric Consultation  1/5/2023 1155 by Sky Zhang RN  Outcome: Progressing  2/0/1642 3075 by Alber Norwood RN  Outcome: Progressing     Problem: Involuntary Admit  Goal: Will cooperate with staff recommendations and doctor's orders and will demonstrate appropriate behavior  Description: INTERVENTIONS:  1. Treat underlying conditions and offer medication as ordered  2. Educate regarding involuntary admission procedures and rules  3.  Contain excessive/inappropriate behavior per unit and hospital policies  6/5/0137 8942 by Sky Zhang RN  Outcome: Progressing  Flowsheets (Taken 1/5/2023 1153)  Will cooperate with staff recommendations and doctor's orders and will demonstrate appropriate behavior:   Treat underlying conditions and offer medication as ordered   Contain excessive/inappropriate behavior per unit and hospital policies   Educate regarding involuntary admission procedures and rules  8/5/1300 6956 by Alber Norwood RN  Outcome: Progressing

## 2023-01-05 NOTE — PROGRESS NOTES
Pt reports poor sleep , explained new meds and reports she cant take Seroquel for pain, reports using gabapentin 600mg for restless leg that did help with sleep, that she just didn't get it refilled due to a moving situation and refills . Gave newly ordered med depakote , it was explained and given, med education print out given, explained vistaril for anxiety that is mostly her problem and depression #4. Pt denied having suicidal thoughts.

## 2023-01-05 NOTE — PROGRESS NOTES
Pt. presents pleasant. Worrisome affect. Reports feeling depressed for several months and feels like she needs a mood stabilizer to smooth out her moods. Recently moved in with bf uncle and is having financial issues, and is not currently employed. Reports increased sleep. Low motivation. Anhedonia. Reports suicidal thoughts prior to admission with no plan. Has had past attempts. Has supportive friends and family and bf x4 years is supportive as well. Drinks ETOH 2-3x weekly to maybe once a month. Smokes marijuana 1x weekly and denies using any other drugs. Resource folder given and explained.   Stressors include  1.not working  2.not seeing daughter regularly  3.current living situation  *swim/beach, snow mobile,  being  outside, draw/color, word search   Electronically signed by Emmanuel Zhu on 1/5/2023 at 9:03 AM

## 2023-01-05 NOTE — PLAN OF CARE
Admission completed. Pt stated last few days she's been having a tough time getting out of bed and lost interest in doing things she enjoys doing. Pt had an argument with boyfriend d/t his encouragement to go help pt. Pt left the house starting walking down the road. Pt then called an ambulance. Pt stated she felt overwhelmed over nothing. Pt has been unemployed for a few months, and recently moved to a new place with boyfriend who is very supportive. Pt has 2 Children, 11 and 4 who don't live with her. Pt states she is aware she has depression, says she \"is either way down or way up, I don't have a real middle. \"  Pt would like to get on a mood stabilizer to help. Pt's boyfriend encouraged her to go to [facility] for treatment and now she believes she should. Pt reports good sleep/appetite. LBM today. Pt rates anxiety 5/10, depression 0/10. Pt states she feels down. Pt unsure as to why she's down. Pt stated there has been no defining reason for anxiety. Pt states she's been struggling with anxiety for a few month. Denies SI/HI/AVH. Pt denies further needs. Problem: Self Harm/Suicidality  Goal: Will have no self-injury during hospital stay  Description: INTERVENTIONS:  1. Ensure constant observer at bedside with Q15M safety checks  2. Maintain a safe environment  3. Secure patient belongings  4. Ensure family/visitors adhere to safety recommendations  5. Ensure safety tray has been added to patient's diet order  6. Every shift and PRN: Re-assess suicidal risk via Frequent Screener    Outcome: Progressing     Problem: Risk for Elopement  Goal: Patient will not exit the unit/facility without proper excort  Outcome: Progressing     Problem: Anxiety  Goal: Will report anxiety at manageable levels  Description: INTERVENTIONS:  1. Administer medication as ordered  2. Teach and rehearse alternative coping skills  3.  Provide emotional support with 1:1 interaction with staff  Outcome: Progressing Problem: Coping  Goal: Pt/Family able to verbalize concerns and demonstrate effective coping strategies  Description: INTERVENTIONS:  1. Assist patient/family to identify coping skills, available support systems and cultural and spiritual values  2. Provide emotional support, including active listening and acknowledgement of concerns of patient and caregivers  3. Reduce environmental stimuli, as able  4. Instruct patient/family in relaxation techniques, as appropriate  5. Assess for spiritual pain/suffering and initiate Spiritual Care, Psychosocial Clinical Specialist consults as needed  Outcome: Progressing     Problem: Decision Making  Goal: Pt/Family able to effectively weigh alternatives and participate in decision making related to treatment and care  Description: INTERVENTIONS:  1. Determine when there are differences between patient's view, family's view, and healthcare provider's view of condition  2. Facilitate patient and family articulation of goals for care  3. Help patient and family identify pros/cons of alternative solutions  4. Provide information as requested by patient/family  5. Respect patient/family right to receive or not to receive information  6. Serve as a liaison between patient and family and health care team  7. Initiate Consults from Ethics, Palliative Care or initiate [address] [place] [address] as is appropriate  Outcome: Progressing     Problem: Depression/Self Harm  Goal: Effect of psychiatric condition will be minimized and patient will be protected from self harm  Description: INTERVENTIONS:  1. Assess impact of patient's symptoms on level of functioning, self care needs and offer support as indicated  2. Assess patient/family knowledge of depression, impact on illness and need for teaching  3. Provide emotional support, presence and reassurance  4. Assess for possible suicidal thoughts or ideation.  If patient expresses suicidal thoughts or statements do not leave alone, initiate Suicide Precautions, move to a room close to the nursing station and obtain sitter  5. Initiate consults as appropriate with Mental Health Professional, Spiritual Care, Psychosocial CNS, and consider a recommendation to the LIP for a Psychiatric Consultation  Outcome: Progressing     Problem: Involuntary Admit  Goal: Will cooperate with staff recommendations and doctor's orders and will demonstrate appropriate behavior  Description: INTERVENTIONS:  1. Treat underlying conditions and offer medication as ordered  2. Educate regarding involuntary admission procedures and rules  3.  Contain excessive/inappropriate behavior per unit and hospital policies  Outcome: Progressing

## 2023-01-05 NOTE — PROGRESS NOTES
Offered and gave vistaril ,pt stated she has had before and that does ok with her restless leg, pt appears more relaxed from am Depakote given but still wanted vistaril for anxiety, reports traz and Seroquel make her restless leg worse.

## 2023-01-05 NOTE — GROUP NOTE
Group Therapy Note    Date: 2023    Group Start Time: 790  Group End Time: 1600  Group Topic: Healthy Living/Wellness    [facility]    Yuli Kerr RN        Group Therapy Note    Attendees:          Patient's Goal:  ***    Notes:  ***    Status After Intervention:  {Status After Intervention:255804447}    Participation Level: {Participation Level:790948787}    Participation Quality: {[facility] PARTICIPATION QUALITY:709126484}      Speech:  {[facility] CD_SPEECH:15631}      Thought Process/Content: {Thought Process/Content:361498616}      Affective Functioning: {Affective Functionin}      Mood: {Mood:444372600}      Level of consciousness:  {Level of consciousness:484107891}      Response to Learnin Lauren Robbin [facility] Responses to Learnin}      Endings: {[facility] Endings:18489}    Modes of Intervention: {[facility] Modes of Intervention:536886877}      Discipline Responsible: Dang PRUETT Multidisciplinary:433511466}      Signature:  Yuli Kerr RN

## 2023-01-05 NOTE — CARE COORDINATION
Brief Intervention and Referral to Treatment Summary    Patient was provided PHQ-9, AUDIT-C and DAST Screening:      PHQ-9 Score: 14  AUDIT-C Score:  8  DAST Score:  0  ETOH .163  UDS positive for cocaine    Patient’s substance use is considered     Low Risk/Healthy X (drug)  Moderate Risk  Harmful X (alcohol)  Dependent    Patient’s depression is considered:     Minimal  Mild   Moderate X  Moderately Severe  Severe    Brief Education Was Provided    Patient was receptive X  Patient was not receptive      Brief Intervention Is Provided (Only for AUDIT-C or DAST)     Patient reports readiness to decrease and/or stop use and a plan was discussed   Patient denies readiness to decrease and/or stop use and a plan was not discussed X      Recommendations/Referrals for Brief and/or Specialized Treatment Provided to Patient    Patient denies she is alcohol dependent and abuses drugs. She refused consult/referral with [facility].  She would like to start receiving MH services with [facility].

## 2023-01-05 NOTE — PROGRESS NOTES
Pt to NS with c/o inability to stay asleep. Requested and was medicated with desyrel 50mg po at 0203 for sleep

## 2023-01-05 NOTE — GROUP NOTE
Group Therapy Note    Date: 1/5/2023    Group Start Time: 2526  Group End Time: 1440  Group Topic: Healthy Living/Wellness    [facility]    Rylan Chen        Group Therapy Note    Attendees: 12/23       Patient's Goal: To participate in a group learning about the different styles of communication. Notes:  Patient actively participated. Status After Intervention:  Improved    Participation Level:  Active Listener and Interactive    Participation Quality: Appropriate, Attentive, and Sharing      Speech:  normal      Thought Process/Content: Logical      Affective Functioning: Constricted/Restricted      Mood: euthymic      Level of consciousness:  Alert and Attentive      Response to Learning: Progressing to goal      Endings: None Reported    Modes of Intervention: Education      Discipline Responsible: [address] [employer]      Signature:  Rylan Chen

## 2023-01-05 NOTE — H&P
Department of Psychiatry  TELE PSYCHIATRY/ VIRTUAL ASSESSMENT  History and Physical - Adult       CHIEF COMPLAINT:  Depression SI    History obtained from:  patient, electronic medical record    Patient was seen after discussing with the treatment team and reviewing the chart    CIRCUMSTANCES OF ADMISSION:    Pt reports she had a fight with her boyfriend last night after consuming alcohol. She began having thoughts of suicide, she became fearful of acting on thos thoughts due to past attempt and so called 911 herself. She states she has been more depressed, sleeping much of day, has little interest in activities, She had a recent job loss and this is a stressors as well as leaving a lot of open time. She denies symptoms of psychosis and no overt symptoms of same are noted. HISTORY OF PRESENT ILLNESS:      The patient is a 27 y.o. female live with her BF, unemployed. H/O addiction in the past, last admit was in 2020  Pt report that she has been depressed for the past few months. No specific stressor. Was thinking about suicide, no specific plan but was scared of acting on those thoughts. Called 911 and came here. Duration:  Severity: Rating mood to be around 2/10 (10- good)  Quality:melancholic  Content: Hopeless, worthless and helpless feeling  Suicidal thoughts - passive  Associated symptoms:  Poor concentration, anhedonia, decrease motivation  Sleep - increase and appetite- good    Stressors: denies    The patient is not currently receiving care for the above psychiatric illness. Medications Prior to Admission:   Medications Prior to Admission: gabapentin (NEURONTIN) 300 MG capsule, Take 1 capsule by mouth nightly for 90 days.     Compliance: has been taking neurontin in the past - not in the past few months    Psychiatric Review of Systems       Depression: yes     Cammie or Hypomania:  yes- mood swings, anger irritable, frustrated, impulsive     Panic Attacks:  no     Phobias:  no     Obsessions and Compulsions:  no     PTSD : denies     Hallucinations:  no     Delusions:  no    Substance Abuse History:  Pt report drinking once a week - 6-8 beers, last drink was 2 days ago. No drugs, sober from cocaine use- for few weeks. MJ occasionaly    Past Psychiatric History:  Prior Diagnosis:  Substance disorders:  Opioid use disorder, ? Cocaine use disorder  Psychiatrist: no  Therapist:no  Hospitalization: no, denies  Hx of Suicidal Attempts: yes  Hx of violence:  no  ECT: no  Previous discontinued Psychiatric Med Trials: n/a    Past Medical History:        Diagnosis Date    Hepatitis C 12/25/2014    Polysubstance abuse (HCC)     Hx opiates, cocaine, ETOH    Restless legs syndrome (RLS)        Past Surgical History:        Procedure Laterality Date    ANKLE FRACTURE SURGERY Left 1/3/2022    OPEN REDUCTION INTERNAL FIXATION LEFT POSTERIOR MALLEOLUS FRACTURE performed by Lisandro Castañeda MD at [address] [place], [place] [address]         Allergies:   No known allergies    Family History  History reviewed. No pertinent family history. Social History:    Describes Childhood:   loving  Education: 11th grade  Employment: see above  Relationships:   Children:  two children, as above  Current Support: romantic partner  And grandparents  Legal Hx:  she was arrested in the past for drug-related charges  Access to weapons?:  No      EXAMINATION:    REVIEW OF SYSTEMS:    ROS:  [x] All negative/unchanged except if checked.  Explain positive(checked items) below:  [] Constitutional  [] Eyes  [] Ear/Nose/Mouth/Throat  [] Respiratory  [] CV  [] GI  []   [] Musculoskeletal  [] Skin/Breast  [] Neurological  [] Endocrine  [] Heme/Lymph  [] Allergic/Immunologic    Explanation:     Vitals:  /60   Pulse 84   Temp 98.6 °F (37 °C) (Oral)   Resp 18   Ht 5' 6\" (1.676 m)   Wt 170 lb (77.1 kg)   SpO2 99%   BMI 27.44 kg/m²      Neurologic Exam:   Muscle Strength & Tone: full ROM  Gait: normal gait   Involuntary Movements: No    Mental Status Examination:    Level of consciousness:  within normal limits   Appearance:  well-appearing, street clothes, good grooming and good hygiene  Behavior/Motor:  no abnormalities noted  Attitude toward examiner:  Superficially cooperative and good eye contact  Speech:  spontaneous, normal rate, normal volume and well articulated   Mood: depression, mood swings  Affect:  mood congruent  Thought processes:  linear, goal directed and coherent   Thought content:  hopeless and worthless  Homocidal ideation denies  Suicidal Ideation:   suicidal ideation ++  Delusions:  no evidence of delusions  Perceptual Disturbance:  denies any perceptual disturbance  Cognition:  oriented to person, place, and time   Concentration distractible  Memory intact   Insight limited   Judgement limited   Fund of Knowledge fair          DIAGNOSIS:    Bipolar depression  Cocaine use disorder  Alcohol use disorder          RISK ASSESSMENT:    SUICIDE RISK ASSESSMENT: high  HOMICIDE: low  AGITATION/VIOLENCE: low  ELOPEMENT: high    LABS: REVIEWED TODAY:  Recent Labs     01/04/23 0415   WBC 10.1   HGB 15.5        Recent Labs     01/04/23 0415      K 3.9      CO2 21   BUN 7   CREATININE 0.57   GLUCOSE 82     Recent Labs     01/04/23 0415   BILITOT <0.2   ALKPHOS 114   AST 21   ALT 23     Lab Results   Component Value Date/Time    LABAMPH Neg 01/04/2023 04:15 AM    BARBSCNU Neg 01/04/2023 04:15 AM    LABBENZ Neg 01/04/2023 04:15 AM    LABMETH Neg 01/04/2023 04:15 AM    OPIATESCREENURINE Neg 01/04/2023 04:15 AM    PHENCYCLIDINESCREENURINE Neg 01/04/2023 04:15 AM    ETOH 186 01/04/2023 04:15 AM     Lab Results   Component Value Date/Time    TSH 2.550 12/23/2020 12:15 PM     No results found for: LITHIUM  No results found for: VALPROATE, CBMZ  No results found for: LITHIUM, VALPROATE    FURTHER LABS ORDERED :      Radiology   Ir Picc Wo Sq Port/pump > 5 Years    Result Date: 12/25/2020  IR PICC WO SQ PORT/PUMP > 5 YEARS: 12/24/2020 CLINICAL HISTORY:  IV medications or septic shock; including pressors . PROCEDURE: After discussing the procedure and possible complications with the patient, informed consent was obtained. The patient was placed on the Special Procedures table. Central venous catheter was inserted using a maximal sterile barrier technique which includes cap, mask, sterile gown, sterile gloves, sterile full-body drape, hand hygiene and 2% chlorhexidine for cutaneous antisepsis. A sterile ultrasound technique with sterile gel and sterile probe covers was also utilized. The right upper extremity was sterilely prepared using 2% chlorhexidine and then draped with sterile towels and a body-sized sterile drape. All personnel in the Special Procedures Room donned caps and surgical masks. In addition, the  and first assistant donned sterile gowns and gloves after proper hand cleansing. A pre-procedure time out was performed in order to assure the correct patient and procedure. Local anesthetic was administered. A peripheral vein was accessed with sonographic guidance. A sonographic spot image was obtained for documentation. A guidewire was advanced into the vein with fluoroscopic guidance and a sheath was placed over the guidewire. A 5-Palestinian 40 cm dual lumen PICC was advanced through the sheath, up the arm and into the central vasculature. It was positioned appropriately. The sheath was removed. The catheter was shown to aspirate and infuse properly. The flange of the catheter was affixed to the arm using a PICC securement device. A spot image of the chest showed the tip of the PICC line to lie in the cavoatrial junction. The patient tolerated the procedure well and without complications. SUCCESSFUL PICC PLACEMENT WITHOUT IMMEDIATE COMPLICATIONS. 2.4 mGy of fluoroscopy was used, with 1 fluoroscopic still saved. No diagnostic images were obtained.        EKG: TRACING REVIEWED    TREATMENT PLAN:    Risk Management:  close watch and suicide risk    Collateral Information:  Will obtain collateral information from the family or friends. Will obtain medical records as appropriate from out patient providers  Will consult the hospitalist for a physical exam to rule out any co-morbid physical condition. Home medication Reconciled       New Medications started during this admission :    As ordered    Prn Haldol 5mg and Vistaril 50mg q6hr for extreme agitation.   Trazodone as ordered for insomnia  Vistaril as ordered for anxiety    Psychotherapy:   Encourage participation in milieu and group therapy  Individual therapy as needed          Electronically signed by Gale Levy MD on 1/5/2023 at 9:28 AM

## 2023-01-05 NOTE — GROUP NOTE
Group Therapy Note    Date: 1/5/2023    Group Start Time: 1600  Group End Time: 36  Group Topic: Healthy Living/Wellness    [facility]    Warner Saleh RN        Group Therapy Note    Attendees: 24/24       Patient's Goal:  Tips on how to manage stress and anxiety    Notes:  Good participation    Status After Intervention:  Unchanged    Participation Level:  Active Listener    Participation Quality: Appropriate      Speech:  normal      Thought Process/Content: Logical      Affective Functioning: Congruent      Mood: euthymic      Level of consciousness:  Alert      Response to Learning: Progressing to goal      Endings: None Reported    Modes of Intervention: Support      Discipline Responsible: Registered Nurse      Signature:  Warner Saleh RN

## 2023-01-05 NOTE — PROGRESS NOTES
Pt. refused to attend the 1000 skills group, despite staff encouragement.   Electronically signed by Papito Richardson on 1/5/2023 at 11:30 AM

## 2023-01-05 NOTE — CARE COORDINATION
Psychosocial Assessment    Current Level of Psychosocial Functioning     Independent   Dependent    Minimal Assist X    Comments:  Major Depressive Disorder, Severe   Patient is unemployed and lives with her boyfriend and his uncle. alcohol and cocaine abuse, and boyfriend is also abusing drugs. Not connected with [address] provider  Sleeps much of the day and has little interest in activities. Psychosocial High Risk Factors (check all that apply)    Unable to obtain meds   Chronic illness/pain    Substance abuse X  Lack of Family Support X (estranged)  Financial stress X  Isolation   Inadequate Community Resources X  Suicide attempt(s) X  Not taking medications   Victim of crime   Developmental Delay  Unable to manage personal needs    Age 72 or older   Homeless  No transportation   Readmission within 30 days  Unemployment X  Traumatic Event    Family/Supports identified:   Boyfriend and his family are supportive  Sexual Orientation:    Patient is in a heterosexual relationship  Patient Strengths:  Motivated for [address] treatment  Patient Barriers:   Previous SA in 2020, alcohol abuse, unemployed  Safety plan:  completed  CMHC/MH history:  Previous admission 2020  Plan of Care:  medication management, group/individual therapies, family meetings, psycho -education, treatment team meetings to assist with stabilization    Initial Discharge Plan:    Return to her boyfriend's uncle's home  Clinical Summary:    Per notes, Pt reported she had a fight with her boyfriend after consuming alcohol. She began having thoughts of suicide, she became fearful of acting on those thoughts due to past attempt and so called 911 herself. She stated she has been more depressed, sleeping much of day, has little interest in activities. She had a recent job loss and this is a stressors as well as leaving a lot of open time. Patient denied SI at this time.  She denied alcohol dependency and drug use. She stated that she knows she needs help with depression and would like to start services with the [facility]. She has a good support system (boyfriend and his family)  She lives with her boyfriend and his uncle. She plans to discharge back to the uncle's house.  will assist with discharge per doctor's orders.

## 2023-01-05 NOTE — PROGRESS NOTES
Behavioral Services  Medicare Certification Upon Admission    I certify that this patient's inpatient psychiatric hospital admission is medically necessary for:    [x] (1) Treatment which could reasonably be expected to improve this patient's condition,       [x] (2) Or for diagnostic study;     AND     [x](2) The inpatient psychiatric services are provided while the individual is under the care of a physician and are included in the individualized plan of care.     Estimated length of stay/service 3-5    Plan for post-hospital care Op care    Electronically signed by Evy Buckner MD on 1/5/2023 at 9:28 AM

## 2023-01-06 PROCEDURE — 1240000000 HC EMOTIONAL WELLNESS R&B

## 2023-01-06 PROCEDURE — 6370000000 HC RX 637 (ALT 250 FOR IP): Performed by: PSYCHIATRY & NEUROLOGY

## 2023-01-06 RX ORDER — GABAPENTIN 100 MG/1
100 CAPSULE ORAL NIGHTLY
Status: DISCONTINUED | OUTPATIENT
Start: 2023-01-06 | End: 2023-01-08 | Stop reason: HOSPADM

## 2023-01-06 RX ADMIN — GABAPENTIN 100 MG: 100 CAPSULE ORAL at 20:56

## 2023-01-06 RX ADMIN — DIVALPROEX SODIUM 500 MG: 500 TABLET, EXTENDED RELEASE ORAL at 10:02

## 2023-01-06 RX ADMIN — MICONAZOLE NITRATE: 2 POWDER TOPICAL at 20:56

## 2023-01-06 RX ADMIN — MICONAZOLE NITRATE: 2 POWDER TOPICAL at 12:03

## 2023-01-06 NOTE — PROGRESS NOTES
Morning Community Meeting Topics    Tayo Contreras attended the morning community meeting on 1/6/23. Topics discussed today     [x] Introduction  Day of the week and date  Mask distribution  Current mask requirements  [x]Teams  Explanation of  Green and Blue team criteria  Nurses assigned to each team for today  Explanation about green and blue paper  Date  Patient's Name  Patient's Nurse  Goals  [x] Visitation  Announce the visiting hours for the day  Announce which team is allowed to have visitors for the day  Review any updated Covid 19 requirements for visitors during visitation  Vaccine Card or negative Covid test within 48 hours of visit  State Identification  Patients are reminded to alert the  at least 1 hour before visitation   [x] Unit Orientation  Coffee use  Phone location and etiquette  Shower locations  [place] and dryer location and process  Common area expectations  Staff rounds expectation  [x] Meals   Educate patient to the menu  The patient is encouraged to fill out the menu to get preferences at mealtime  The patient is educated that if they do not fill out the menu, they will get the standard tray  The coffee pot is decaf, patient encouraged to order regular coffee from menu.   Educate patient to the meal process  Patient encouraged to eat snacks provided twice daily  Snacks may stay in patient room     [x] Discharge Process  Discharge expectations  Fill out the survey after discharge   [x] Hygiene  Daily showers encouraged  Showers availability discussed   Daily dressing encouraged  Discussed wearing street clothing  Education provided on where to place linens and clothing  Linens in the hamper  personal clothing does not go into the linen hamper  [x] Group   Patient encouraged to attend group provided  Time of Group Meetings discussed  Gentle reminder that attendance is a Physician order  [x] Movement  Chair exercises completed  Stretching completed  Notes: Goal - \"To attend all the groups\" Electronically signed by Dalia Segura, [address] on 1/6/2023 at 1:06 PM

## 2023-01-06 NOTE — GROUP NOTE
Group Therapy Note    Date: 1/6/2023    Group Start Time: 3965  Group End Time: 6139  Group Topic: Healthy Living/Wellness    [facility]    Leim Junk        Group Therapy Note    Attendees: 12/24       Patient's Goal:  To participate in an activity and discussion learning about postponing worry. Notes:  Patient actively participated. Status After Intervention:  Improved    Participation Level:  Active Listener and Interactive    Participation Quality: Appropriate, Attentive, and Sharing      Speech:  normal      Thought Process/Content: Logical      Affective Functioning: Congruent      Mood: euthymic      Level of consciousness:  Alert and Attentive      Response to Learning: Progressing to goal      Endings: None Reported    Modes of Intervention: Education      Discipline Responsible: [address], [employer]      Signature:  Azalia Nathan

## 2023-01-06 NOTE — GROUP NOTE
Group Therapy Note    Date: 1/6/2023    Group Start Time: 1300  Group End Time: 1913  Group Topic: Nursing    [facility]    Mahesh Newell RN        Group Therapy Note    Attendees:13/24       Patient's Goal: Introducing personal boundaries: what they are and how to build appropriate boundaries. Status After Intervention:  Improved    Participation Level:  Active Listener and Interactive    Participation Quality: Appropriate      Speech:  normal      Thought Process/Content: Logical      Affective Functioning: Congruent      Mood: anxious      Level of consciousness:  Oriented x4      Response to Learning: Able to verbalize current knowledge/experience      Endings: None Reported    Modes of Intervention: Education and Exploration      Discipline Responsible: Registered Nurse      Signature:  Mahesh Newell RN

## 2023-01-06 NOTE — PLAN OF CARE
Pt visible on unit, social with select peers. Appears with sad/ flat affect, brightens upon approach. Pt calm/ cooperative. Depressed/ anxious but states improved. Denies disturbances with appetite. C/o poor sleep r/t restless legs and does not want to take Seroquel d/t this. Denies SI, HI, or AVH. Problem: Self Harm/Suicidality  Goal: Will have no self-injury during hospital stay  Description: INTERVENTIONS:  1. Ensure constant observer at bedside with Q15M safety checks  2. Maintain a safe environment  3. Secure patient belongings  4. Ensure family/visitors adhere to safety recommendations  5. Ensure safety tray has been added to patient's diet order  6. Every shift and PRN: Re-assess suicidal risk via Frequent Screener    1/5/2023 2211 by Clarice German RN  Outcome: Progressing  1/5/2023 1155 by Judith Gutiérrez RN  Outcome: Progressing  Flowsheets (Taken 1/5/2023 1153)  Will have no self-injury during hospital stay: Maintain a safe environment     Problem: Anxiety  Goal: Will report anxiety at manageable levels  Description: INTERVENTIONS:  1. Administer medication as ordered  2. Teach and rehearse alternative coping skills  3. Provide emotional support with 1:1 interaction with staff  1/5/2023 2211 by Clarice German RN  Outcome: Progressing  1/5/2023 1155 by Judith Gutiérrez RN  Outcome: Progressing  Flowsheets (Taken 1/5/2023 1153)  Will report anxiety at manageable levels:   Provide emotional support with 1:1 interaction with staff   Administer medication as ordered   Teach and rehearse alternative coping skills     Problem: Coping  Goal: Pt/Family able to verbalize concerns and demonstrate effective coping strategies  Description: INTERVENTIONS:  1. Assist patient/family to identify coping skills, available support systems and cultural and spiritual values  2.  Provide emotional support, including active listening and acknowledgement of concerns of patient and caregivers  3. Reduce environmental stimuli, as able  4. Instruct patient/family in relaxation techniques, as appropriate  5. Assess for spiritual pain/suffering and initiate Spiritual Care, Psychosocial Clinical Specialist consults as needed  1/5/2023 2211 by Cordell Troncoso RN  Outcome: Progressing  1/5/2023 1155 by Keny Hathaway RN  Outcome: Progressing  Flowsheets (Taken 1/5/2023 1155)  Patient/family able to verbalize anxieties, fears, and concerns, and demonstrate effective coping: Reduce environmental stimuli, as able     Problem: Decision Making  Goal: Pt/Family able to effectively weigh alternatives and participate in decision making related to treatment and care  Description: INTERVENTIONS:  1. Determine when there are differences between patient's view, family's view, and healthcare provider's view of condition  2. Facilitate patient and family articulation of goals for care  3. Help patient and family identify pros/cons of alternative solutions  4. Provide information as requested by patient/family  5. Respect patient/family right to receive or not to receive information  6. Serve as a liaison between patient and family and health care team  7. Initiate Consults from Ethics, Palliative Care or initiate [address] [place] [address] as is appropriate  1/5/2023 2211 by Cordell Troncoso RN  Outcome: Progressing  1/5/2023 1155 by Keny Hathaway RN  Outcome: Progressing  Flowsheets (Taken 1/5/2023 1153)  Patient/family able to effectively weigh alternatives and participate in decision making related to treatment and care: Facilitate patient and family articulation of goals for care   Provide information as requested by patient/family     Problem: Depression/Self Harm  Goal: Effect of psychiatric condition will be minimized and patient will be protected from self harm  Description: INTERVENTIONS:  1. Assess impact of patient's symptoms on level of functioning, self care needs and offer support as indicated  2.  Assess patient/family knowledge of depression, impact on illness and need for teaching  3. Provide emotional support, presence and reassurance  4. Assess for possible suicidal thoughts or ideation. If patient expresses suicidal thoughts or statements do not leave alone, initiate Suicide Precautions, move to a room close to the nursing station and obtain sitter  5. Initiate consults as appropriate with Mental Health Professional, Spiritual Care, Psychosocial CNS, and consider a recommendation to the LIP for a Psychiatric Consultation  1/5/2023 2211 by Víctor Seals RN  Outcome: Progressing  Flowsheets (Taken 1/5/2023 2211)  Effect of psychiatric condition will be minimized and patient will be protected from self harm:   Assess for suicidal thoughts or ideation. If patient expresses suicidal thoughts or statements do not leave alone, initiate Suicide Precautions, move near nurse station, obtain sitter   Provide emotional support, presence and reassurance   Assess impact of patients symptoms on level of functioning, self care needs and offer support as indicated  1/5/2023 1155 by Ekaterina Gerber RN  Outcome: Progressing     Problem: Involuntary Admit  Goal: Will cooperate with staff recommendations and doctor's orders and will demonstrate appropriate behavior  Description: INTERVENTIONS:  1. Treat underlying conditions and offer medication as ordered  2. Educate regarding involuntary admission procedures and rules  3.  Contain excessive/inappropriate behavior per unit and hospital policies  2/2/9557 [id number] by Víctor Seals RN  Outcome: Progressing  1/5/2023 1155 by Ekaterina Gerber RN  Outcome: Progressing  Flowsheets (Taken 1/5/2023 1153)  Will cooperate with staff recommendations and doctor's orders and will demonstrate appropriate behavior:   Treat underlying conditions and offer medication as ordered   Contain excessive/inappropriate behavior per unit and hospital policies   Educate regarding involuntary admission procedures and rules

## 2023-01-06 NOTE — GROUP NOTE
Group Therapy Note    Date: 1/6/2023    Group Start Time: 1000  Group End Time: 1050  Group Topic: Psychoeducation    [facility]    Albertina Anglin        Group Therapy Note    Attendees: 12/23       Patient's Goal:  \"To attend all the groups\"    Notes:  Patient attended the 1000 skills group. Attentive, she focused well on her project and overall participation was good. Status After Intervention:  Improved    Participation Level:  Active Listener    Participation Quality: Appropriate      Speech:  normal      Thought Process/Content: Logical      Affective Functioning: Congruent      Mood:  calm      Level of consciousness:  Alert      Response to Learning: Progressing to goal      Endings: None Reported    Modes of Intervention: Education, Socialization, and Activity      Discipline Responsible: Psychoeducational Specialist      Signature:  Albertina Anglin

## 2023-01-06 NOTE — PROGRESS NOTES
Pt is noted just out from seeing doctor, pt reports she feels better thinks the Depakote is help already, denied any depression , anxiety #3-4, wants to hold off on the vistaril.  Pt is aware of pm Neurontin for restless leg, pt is noted to be social with select peers ,attending groups, denies any suicidal thoughts, appears more  more hopeful

## 2023-01-06 NOTE — GROUP NOTE
Group Therapy Note    Date: 1/6/2023    Group Start Time: 1600  Group End Time: 36  Group Topic: Healthy Living/Wellness    [facility]    Warner Saleh RN        Group Therapy Note    Attendees: 24/24       Patient's Goal:  How to Forgive Yourself    Notes:  Good participation    Status After Intervention:  Unchanged    Participation Level:  Active Listener    Participation Quality: Appropriate      Speech:  normal      Thought Process/Content: Logical      Affective Functioning: Congruent      Mood: euthymic      Level of consciousness:  Alert      Response to Learning: Progressing to goal      Endings: None Reported    Modes of Intervention: Support      Discipline Responsible: Registered Nurse      Signature:  Warner Saleh RN

## 2023-01-06 NOTE — PROGRESS NOTES
[facility] FOLLOW-UP NOTE       1/6/2023     Patient was seen and examined in person, Chart reviewed   Patient's case discussed with staff/team    Chief Complaint: Depression SI    Interim History:     Reports improved sleep; however states she continues to struggle with restless leg - reports previously utilizing gabapentin due to multiple failed medication trials   Reports improved mood and motivation; continues to express some mood lability including irritability  Pt states she is unable to take seroquel bc it makes her restless worse   Pt reports currently staying at boy friends uncles house  Denies SI HI AVH; no delusions noted     Appetite:   [x] Normal/Unchanged  [] Increased  [] Decreased      Sleep:       [] Normal/Unchanged  [x] Fair       [] Poor              Energy:    [x] Normal/Unchanged  [] Increased  [] Decreased        SI [] Present  [x] Absent    HI  []Present  [x] Absent     Aggression:  [] yes  [x] no    Patient is [x] able  [] unable to CONTRACT FOR SAFETY     PAST MEDICAL/PSYCHIATRIC HISTORY:   Past Medical History:   Diagnosis Date    Hepatitis C 12/25/2014    Polysubstance abuse ([facility] [address]     Hx opiates, cocaine, ETOH    Restless legs syndrome (RLS)        FAMILY/SOCIAL HISTORY:  History reviewed. No pertinent family history. Social History     Socioeconomic History    Marital status: Single     Spouse name: Not on file    Number of children: Not on file    Years of education: Not on file    Highest education level: Not on file   Occupational History    Not on file   Tobacco Use    Smoking status: Every Day     Packs/day: 1.00     Years: 15.00     Pack years: 15.00     Types: Cigarettes     Start date: 6/5/2005    Smokeless tobacco: Never   Substance and Sexual Activity    Alcohol use:  Yes     Alcohol/week: 12.0 standard drinks     Types: 12 Cans of beer per week     Comment: daily    Drug use: No     Types: Marijuana (Weed), IV, Cocaine, Opiates Comment: weed daily    Sexual activity: Yes     Partners: Male   Other Topics Concern    Not on file   Social History Narrative    Not on file     Social Determinants of Health     Financial Resource Strain: Not on file   Food Insecurity: Not on file   Transportation Needs: Not on file   Physical Activity: Not on file   Stress: Not on file   Social Connections: Not on file   Intimate Partner Violence: Not on file   Housing Stability: Not on file           ROS:  [x] All negative/unchanged except if checked.  Explain positive(checked items) below:  [] Constitutional  [] Eyes  [] Ear/Nose/Mouth/Throat  [] Respiratory  [] CV  [] GI  []   [] Musculoskeletal  [] Skin/Breast  [] Neurological  [] Endocrine  [] Heme/Lymph  [] Allergic/Immunologic    Explanation:     MEDICATIONS:    Current Facility-Administered Medications:     divalproex (DEPAKOTE ER) extended release tablet 500 mg, 500 mg, Oral, Daily, Elías Lundy MD, 500 mg at 01/05/23 1023    QUEtiapine (SEROQUEL) tablet 50 mg, 50 mg, Oral, Nightly, Elías Lundy MD    miconazole (MICOTIN) 2 % powder, , Topical, BID, Lyndsey Laming, APRN - CNP, Given at 01/05/23 2116    acetaminophen (TYLENOL) tablet 650 mg, 650 mg, Oral, Q4H PRN, Elías Lundy MD    magnesium hydroxide (MILK OF MAGNESIA) 400 MG/5ML suspension 30 mL, 30 mL, Oral, Daily PRN, Elías Lundy MD    aluminum & magnesium hydroxide-simethicone (MAALOX) 200-200-20 MG/5ML suspension 30 mL, 30 mL, Oral, PRN, Elías Lundy MD    haloperidol (HALDOL) tablet 5 mg, 5 mg, Oral, Q6H PRN **OR** haloperidol lactate (HALDOL) injection 5 mg, 5 mg, IntraMUSCular, Q6H PRN, Elías Lundy MD    benztropine mesylate (COGENTIN) injection 2 mg, 2 mg, IntraMUSCular, BID PRN, Elías Lundy MD    traZODone (DESYREL) tablet 50 mg, 50 mg, Oral, Nightly PRN, Elías Lundy MD, 50 mg at 01/05/23 0203    nicotine (NICODERM CQ) 21 MG/24HR 1 patch, 1 patch, TransDERmal, Daily, Elías Lundy MD, 1 patch at 01/04/23 1813    hydrOXYzine pamoate (VISTARIL) capsule 50 mg, 50 mg, Oral, Q6H PRN, 50 mg at 01/05/23 1146 **OR** hydrOXYzine (VISTARIL) injection 50 mg, 50 mg, IntraMUSCular, Q6H PRN, Alen Orellana MD      Examination:  BP (!) 106/46   Pulse 80   Temp 98.1 °F (36.7 °C)   Resp 20   Ht 5' 6\" (1.676 m)   Wt 170 lb (77.1 kg)   SpO2 100%   BMI 27.44 kg/m²   Gait - steady  Medication side effects(SE): denies     Mental Status Examination:    Level of consciousness:  within normal limits   Appearance:  fair grooming and fair hygiene  Behavior/Motor:  no abnormalities noted  Attitude toward examiner:  cooperative and attentive  Speech:  spontaneous, normal rate, and normal volume   Mood: depressed, lability  Affect:  flat  Thought processes:  linear and goal directed   Thought content:  Suicidal Ideation:  denies suicidal ideation  Cognition:  oriented to person, place, and time   Concentration distractible  Insight fair   Judgement fair     ASSESSMENT:   Patient symptoms are:  [] Well controlled  [x] Improving  [] Worsening  [] No change      Diagnosis:   Principal Problem:    Major depressive disorder, severe ([facility] [address]  Resolved Problems:    * No resolved hospital problems.  *      LABS:    Recent Labs     01/04/23  0415   WBC 10.1   HGB 15.5        Recent Labs     01/04/23  0415      K 3.9      CO2 21   BUN 7   CREATININE 0.57   GLUCOSE 82     Recent Labs     01/04/23  0415   BILITOT <0.2   ALKPHOS 114   AST 21   ALT 23     Lab Results   Component Value Date/Time    LABAMPH Neg 01/04/2023 04:15 AM    BARBSCNU Neg 01/04/2023 04:15 AM    LABBENZ Neg 01/04/2023 04:15 AM    LABMETH Neg 01/04/2023 04:15 AM    OPIATESCREENURINE Neg 01/04/2023 04:15 AM    PHENCYCLIDINESCREENURINE Neg 01/04/2023 04:15 AM    ETOH 186 01/04/2023 04:15 AM     Lab Results   Component Value Date/Time    TSH 2.550 12/23/2020 12:15 PM     No results found for: LITHIUM  No results found for: VALPROATE, CBMZ    RISK ASSESSMENT: low    Treatment Plan:  Reviewed current Medications with the patient. DC seroquel, Initiate Gabapentin 100mg QHS  Risks, benefits, side effects, drug-to-drug interactions and alternatives to treatment were discussed. Collateral information: pending   CD evaluation THC  Encourage patient to attend group and other milieu activities. Discharge planning discussed with the patient and treatment team.    PSYCHOTHERAPY/COUNSELING:  [x] Therapeutic interview  [x] Supportive  [] CBT  [] Ongoing  [] Other    [x] Patient continues to need, on a daily basis, active treatment furnished directly by or requiring the supervision of inpatient psychiatric personnel      Anticipated Length of stay:4 days        COSIGN : yes    Electronically signed by OTTO Vaca CNP on 1/6/2023 at 9:40 AM     Addendum:  Jimmy Rodriguez seen with NP after attending the team meeting, discussing the patient with the nursing and social work staff. I participated during the interview process as well as the treatment plan and spent more than 70% of the time with the nurse practitioner helping me in documentation.   I agree with the above documentation of clinical finding and treatment plan    Physician Signature: Electronically signed by Mackenzie Ziegler MD on 1/6/2023 at 4:40 PM

## 2023-01-06 NOTE — CARE COORDINATION
Family/Support Name: Toro Durant #:  [phone]  Relationship to Patient: boyfriend    Placed call to above for collateral information,    Response: called and it rang and immediately disconnected. Attempted to call # on face sheet [phone] listed under norma. Reports it is not him but he used to let him use his phone. Reports he doesn't have a number for him but writer provided unit # and have him call social work if he is able to pass message along to [facility]. No confidential information was given.

## 2023-01-07 PROBLEM — F31.9 BIPOLAR DEPRESSION (HCC): Status: ACTIVE | Noted: 2023-01-04

## 2023-01-07 PROCEDURE — 6370000000 HC RX 637 (ALT 250 FOR IP): Performed by: PSYCHIATRY & NEUROLOGY

## 2023-01-07 PROCEDURE — 1240000000 HC EMOTIONAL WELLNESS R&B

## 2023-01-07 RX ADMIN — MICONAZOLE NITRATE: 2 POWDER TOPICAL at 22:03

## 2023-01-07 RX ADMIN — DIVALPROEX SODIUM 500 MG: 500 TABLET, EXTENDED RELEASE ORAL at 08:57

## 2023-01-07 RX ADMIN — GABAPENTIN 100 MG: 100 CAPSULE ORAL at 20:58

## 2023-01-07 NOTE — PROGRESS NOTES
Morning Community Meeting Topics    Aminata Phillips attended the morning community meeting on 1/7/23. Topics discussed today     [x] Introduction  Day of the week and date  Mask distribution  Current mask requirements  [x]Teams  Explanation of  Green and Blue team criteria  Nurses assigned to each team for today  Explanation about green and blue paper  Date  Patient's Name  Patient's Nurse  Goals  [x] Visitation  Announce the visiting hours for the day  Announce which team is allowed to have visitors for the day  Review any updated Covid 19 requirements for visitors during visitation  Vaccine Card or negative Covid test within 48 hours of visit  State Identification  Patients are reminded to alert the  at least 1 hour before visitation   [x] Unit Orientation  Coffee use  Phone location and etiquette  Shower locations  [place] and dryer location and process  Common area expectations  Staff rounds expectation  [x] Meals   Educate patient to the menu  The patient is encouraged to fill out the menu to get preferences at mealtime  The patient is educated that if they do not fill out the menu, they will get the standard tray  The coffee pot is decaf, patient encouraged to order regular coffee from menu.   Educate patient to the meal process  Patient encouraged to eat snacks provided twice daily  Snacks may stay in patient room     [x] Discharge Process  Discharge expectations  Fill out the survey after discharge   [x] Hygiene  Daily showers encouraged  Showers availability discussed   Daily dressing encouraged  Discussed wearing street clothing  Education provided on where to place linens and clothing  Linens in the hamper  personal clothing does not go into the linen hamper  [x] Group   Patient encouraged to attend group provided  Time of Group Meetings discussed  Gentle reminder that attendance is a Physician order  [x] Movement  Chair exercises completed  Stretching completed  Notes: Goal - \"Walk some laps\" Electronically signed by Fabio Bucio, [address] on 1/7/2023 at 9:38 AM

## 2023-01-07 NOTE — GROUP NOTE
Group Therapy Note    Date: 1/6/2023    Group Start Time: 1630  Group End Time: 2274  Group Topic: Wrap-Up    [facility]    Rosalva Pedraza RN        Group Therapy Note    Attendees: 10/24       Patient's Goal:  To be positive    Notes:  Progressing    Status After Intervention:  Unchanged    Participation Level:  Active Listener    Participation Quality: Appropriate      Speech:  normal      Thought Process/Content: Logical      Affective Functioning: Congruent      Mood: euthymic      Level of consciousness:  Alert      Response to Learning: Progressing to goal      Endings: None Reported    Modes of Intervention: Support      Discipline Responsible: Registered Nurse      Signature:  Rosalva Pedraza RN

## 2023-01-07 NOTE — PLAN OF CARE
Pt is out on the unit and social with peers. Pt states she is feeling better today. Pt states anxiety is 4/10 and depression is 3/10. Denies SI, HI, AVH. Pt is attending groups and participating in hygiene care. Problem: Self Harm/Suicidality  Goal: Will have no self-injury during hospital stay  Description: INTERVENTIONS:  1. Ensure constant observer at bedside with Q15M safety checks  2. Maintain a safe environment  3. Secure patient belongings  4. Ensure family/visitors adhere to safety recommendations  5. Ensure safety tray has been added to patient's diet order  6. Every shift and PRN: Re-assess suicidal risk via Frequent Screener    Outcome: Progressing  Flowsheets (Taken 1/7/2023 1457)  Will have no self-injury during hospital stay:   Ensure constant observer at bedside with Q15M safety checks   Ensure family/visitors adhere to safety recommendations   Every shift and PRN: Re-assess suicidal risk via Frequent Screener   Maintain a safe environment   Secure patient belongings   Ensure safety tray has been added to patient's diet order     Problem: Anxiety  Goal: Will report anxiety at manageable levels  Description: INTERVENTIONS:  1. Administer medication as ordered  2. Teach and rehearse alternative coping skills  3. Provide emotional support with 1:1 interaction with staff  Outcome: Progressing  Flowsheets (Taken 1/7/2023 1457)  Will report anxiety at manageable levels:   Administer medication as ordered   Provide emotional support with 1:1 interaction with staff   Teach and rehearse alternative coping skills     Problem: Coping  Goal: Pt/Family able to verbalize concerns and demonstrate effective coping strategies  Description: INTERVENTIONS:  1. Assist patient/family to identify coping skills, available support systems and cultural and spiritual values  2.  Provide emotional support, including active listening and acknowledgement of concerns of patient and caregivers  3. Reduce environmental stimuli, as able  4. Instruct patient/family in relaxation techniques, as appropriate  5. Assess for spiritual pain/suffering and initiate Spiritual Care, Psychosocial Clinical Specialist consults as needed  Outcome: Progressing  Flowsheets (Taken 1/7/2023 4125)  Patient/family able to verbalize anxieties, fears, and concerns, and demonstrate effective coping:   Assist patient/family to identify coping skills, available support systems and cultural and spiritual values   Provide emotional support, including active listening and acknowledgement of concerns of patient and caregivers   Reduce environmental stimuli, as able     Problem: Decision Making  Goal: Pt/Family able to effectively weigh alternatives and participate in decision making related to treatment and care  Description: INTERVENTIONS:  1. Determine when there are differences between patient's view, family's view, and healthcare provider's view of condition  2. Facilitate patient and family articulation of goals for care  3. Help patient and family identify pros/cons of alternative solutions  4. Provide information as requested by patient/family  5. Respect patient/family right to receive or not to receive information  6. Serve as a liaison between patient and family and health care team  7.  Initiate Consults from Ethics, Palliative Care or initiate [address] [place] [address] as is appropriate  Outcome: Progressing  Flowsheets (Taken 1/7/2023 0681)  Patient/family able to effectively weigh alternatives and participate in decision making related to treatment and care:   Determine when there are differences between patient's view, family's view, and healthcare provider's view of condition   Facilitate patient and family articulation of goals for care   Help patient and family identify pros/cons of alternative solutions   Provide information as requested by patient/family     Problem: Depression/Self Harm  Goal: Effect of psychiatric condition will be minimized and patient will be protected from self harm  Description: INTERVENTIONS:  1. Assess impact of patient's symptoms on level of functioning, self care needs and offer support as indicated  2. Assess patient/family knowledge of depression, impact on illness and need for teaching  3. Provide emotional support, presence and reassurance  4. Assess for possible suicidal thoughts or ideation. If patient expresses suicidal thoughts or statements do not leave alone, initiate Suicide Precautions, move to a room close to the nursing station and obtain sitter  5. Initiate consults as appropriate with Mental Health Professional, Spiritual Care, Psychosocial CNS, and consider a recommendation to the LIP for a Psychiatric Consultation  Outcome: Progressing  Flowsheets  Taken 1/7/2023 1502  Effect of psychiatric condition will be minimized and patient will be protected from self harm:   Assess impact of patients symptoms on level of functioning, self care needs and offer support as indicated   Assess patient/family knowledge of depression, impact on illness and need for teaching   Provide emotional support, presence and reassurance   Assess for suicidal thoughts or ideation. If patient expresses suicidal thoughts or statements do not leave alone, initiate Suicide Precautions, move near nurse station, obtain sitter   Initiate consults as appropriate with Mental Health Professional, Spiritual Care, Psychosocial CNS, and consider a recommendation to the LIP for a Psychiatric Consultation  Taken 1/7/2023 3694  Effect of psychiatric condition will be minimized and patient will be protected from self harm:   Assess impact of patients symptoms on level of functioning, self care needs and offer support as indicated   Assess patient/family knowledge of depression, impact on illness and need for teaching   Provide emotional support, presence and reassurance   Assess for suicidal thoughts or ideation.  If patient expresses suicidal thoughts or statements do not leave alone, initiate Suicide Precautions, move near nurse station, obtain sitter   Initiate consults as appropriate with Mental Health Professional, Spiritual Care, Psychosocial CNS, and consider a recommendation to the LIP for a Psychiatric Consultation     Problem: Involuntary Admit  Goal: Will cooperate with staff recommendations and doctor's orders and will demonstrate appropriate behavior  Description: INTERVENTIONS:  1. Treat underlying conditions and offer medication as ordered  2. Educate regarding involuntary admission procedures and rules  3.  Contain excessive/inappropriate behavior per unit and hospital policies  Outcome: Progressing  Flowsheets (Taken 1/7/2023 7331)  Will cooperate with staff recommendations and doctor's orders and will demonstrate appropriate behavior:   Treat underlying conditions and offer medication as ordered   Contain excessive/inappropriate behavior per unit and hospital policies   Educate regarding involuntary admission procedures and rules

## 2023-01-07 NOTE — GROUP NOTE
Group Therapy Note    Date: 1/7/2023    Group Start Time: 0400  Group End Time: 0430  Group Topic: Healthy Living/Wellness    [facility]    Destiny Huggins, RN        Group Therapy Note    Attendees: 13/24       Patient's Goal:  To participate in group    Status After Intervention:  Unchanged    Participation Level: Minimal    Participation Quality: Appropriate      Speech:  normal      Thought Process/Content: Logical      Affective Functioning: Congruent      Mood: euthymic      Level of consciousness:  Alert and Oriented x4      Response to Learning: Progressing to goal      Endings: None Reported    Modes of Intervention: Education      Discipline Responsible: Registered Nurse      Signature:  Chantale Umana RN

## 2023-01-07 NOTE — GROUP NOTE
Group Therapy Note    Date: 1/7/2023    Group Start Time: 8386  Group End Time: 9379  Group Topic: Nursing    [facility]    Arleen Khan RN        Group Therapy Note    Attendees: 16/24       Patient's Goal:  importance of hitting daily goals    Status After Intervention:  Improved    Participation Level:  Active Listener and Interactive    Participation Quality: Appropriate and Supportive      Speech:  normal      Thought Process/Content: Logical      Affective Functioning: Congruent      Mood: euthymic      Level of consciousness:  Alert and Oriented x4      Response to Learning: Progressing to goal      Endings: None Reported    Modes of Intervention: Activity      Discipline Responsible: Registered Nurse      Signature:  Arleen Khan RN

## 2023-01-07 NOTE — GROUP NOTE
Group Therapy Note    Date: 1/7/2023    Group Start Time: 1000  Group End Time: 1050  Group Topic: Psychoeducation    [facility]    Tamiko Jackson        Group Therapy Note    Attendees: 12/24       Patient's Goal:  \"Walk some laps\"    Notes:  Patient attended the 1000 skills group. Patient was attentive, she was more relax and she worked well on her task. Status After Intervention:  Improved    Participation Level:  Active Listener    Participation Quality: Appropriate and Attentive      Speech:  normal      Thought Process/Content: Logical      Affective Functioning: Congruent      Mood:  calm      Level of consciousness:  Alert, Oriented x4, and Attentive      Response to Learning: Progressing to goal      Endings: None Reported    Modes of Intervention: Education, Socialization, and Activity      Discipline Responsible: Psychoeducational Specialist      Signature:  Tamiko Jackson

## 2023-01-07 NOTE — PROGRESS NOTES
Hany Forrester [facility]. FOLLOW-UP NOTE       1/7/2023     Patient was seen and examined in person, Chart reviewed   Patient's case discussed with staff/team    Chief Complaint: Depression SI    Interim History:     Reports report feeling less mood swings, racing thoughts  Less impulsive  No restless leg with neurontin  Work related stress- want to start a job  No vehicle to go to work but her mother in law is going to help her  Depakote level tomorrow  Appetite:   [x] Normal/Unchanged  [] Increased  [] Decreased      Sleep:       [] Normal/Unchanged  [x] Fair       [] Poor              Energy:    [x] Normal/Unchanged  [] Increased  [] Decreased        SI [] Present  [x] Absent    HI  []Present  [x] Absent     Aggression:  [] yes  [x] no    Patient is [x] able  [] unable to CONTRACT FOR SAFETY     PAST MEDICAL/PSYCHIATRIC HISTORY:   Past Medical History:   Diagnosis Date    Hepatitis C 12/25/2014    Polysubstance abuse (HCC)     Hx opiates, cocaine, ETOH    Restless legs syndrome (RLS)        FAMILY/SOCIAL HISTORY:  History reviewed. No pertinent family history. Social History     Socioeconomic History    Marital status: Single     Spouse name: Not on file    Number of children: Not on file    Years of education: Not on file    Highest education level: Not on file   Occupational History    Not on file   Tobacco Use    Smoking status: Every Day     Packs/day: 1.00     Years: 15.00     Pack years: 15.00     Types: Cigarettes     Start date: 6/5/2005    Smokeless tobacco: Never   Substance and Sexual Activity    Alcohol use:  Yes     Alcohol/week: 12.0 standard drinks     Types: 12 Cans of beer per week     Comment: daily    Drug use: No     Types: Marijuana (Weed), IV, Cocaine, Opiates      Comment: weed daily    Sexual activity: Yes     Partners: Male   Other Topics Concern    Not on file   Social History Narrative    Not on file     Social Determinants of Health     Financial Resource Strain: Not on file   Food Insecurity: Not on file   Transportation Needs: Not on file   Physical Activity: Not on file   Stress: Not on file   Social Connections: Not on file   Intimate Partner Violence: Not on file   Housing Stability: Not on file           ROS:  [x] All negative/unchanged except if checked. Explain positive(checked items) below:  [] Constitutional  [] Eyes  [] Ear/Nose/Mouth/Throat  [] Respiratory  [] CV  [] GI  []   [] Musculoskeletal  [] Skin/Breast  [] Neurological  [] Endocrine  [] Heme/Lymph  [] Allergic/Immunologic    Explanation:     MEDICATIONS:    Current Facility-Administered Medications:     gabapentin (NEURONTIN) capsule 100 mg, 100 mg, Oral, Nightly, Michael Blanca MD, 100 mg at 01/06/23 2056    divalproex (DEPAKOTE ER) extended release tablet 500 mg, 500 mg, Oral, Daily, Michael Blanca MD, 500 mg at 01/07/23 0857    miconazole (MICOTIN) 2 % powder, , Topical, BID, Breana Farrar, APRN - CNP, Given at 01/06/23 2056    acetaminophen (TYLENOL) tablet 650 mg, 650 mg, Oral, Q4H PRN, Michael Blanca MD    magnesium hydroxide (MILK OF MAGNESIA) 400 MG/5ML suspension 30 mL, 30 mL, Oral, Daily PRN, Michael Blanca MD    aluminum & magnesium hydroxide-simethicone (MAALOX) 200-200-20 MG/5ML suspension 30 mL, 30 mL, Oral, PRN, Michael Blanca MD    haloperidol (HALDOL) tablet 5 mg, 5 mg, Oral, Q6H PRN **OR** haloperidol lactate (HALDOL) injection 5 mg, 5 mg, IntraMUSCular, Q6H PRN, Michael Blanca MD    benztropine mesylate (COGENTIN) injection 2 mg, 2 mg, IntraMUSCular, BID PRN, Michael Blanca MD    nicotine (NICODERM CQ) 21 MG/24HR 1 patch, 1 patch, TransDERmal, Daily, Michael Blanca MD, 1 patch at 01/07/23 0857    hydrOXYzine pamoate (VISTARIL) capsule 50 mg, 50 mg, Oral, Q6H PRN, 50 mg at 01/05/23 1146 **OR** hydrOXYzine (VISTARIL) injection 50 mg, 50 mg, IntraMUSCular, Q6H PRN, Michael Blanca MD      Examination:  BP 97/60   Pulse 73   Temp 98.3 °F (36.8 °C) (Oral)   Resp 18   Ht 5' 6\" (1.676 m)   Wt 170 lb (77.1 kg)   SpO2 98%   BMI 27.44 kg/m²   Gait - steady  Medication side effects(SE): denies     Mental Status Examination:    Level of consciousness:  within normal limits   Appearance:  fair grooming and fair hygiene  Behavior/Motor:  no abnormalities noted  Attitude toward examiner:  cooperative and attentive  Speech:  spontaneous, normal rate, and normal volume   Mood: less depressed  Affect:  flat  Thought processes:  linear and goal directed   Thought content:  Suicidal Ideation:  denies suicidal ideation  Cognition:  oriented to person, place, and time   Concentration distractible  Insight fair   Judgement fair     ASSESSMENT:   Patient symptoms are:  [] Well controlled  [x] Improving  [] Worsening  [] No change      Diagnosis:   Principal Problem:    [facility] ([facility]  Resolved Problems:    * No resolved hospital problems. *      LABS:    No results for input(s): WBC, HGB, PLT in the last 72 hours. No results for input(s): NA, K, CL, CO2, BUN, CREATININE, GLUCOSE in the last 72 hours. No results for input(s): BILITOT, ALKPHOS, AST, ALT in the last 72 hours. Lab Results   Component Value Date/Time    LABAMPH Neg 01/04/2023 04:15 AM    BARBSCNU Neg 01/04/2023 04:15 AM    LABBENZ Neg 01/04/2023 04:15 AM    LABMETH Neg 01/04/2023 04:15 AM    OPIATESCREENURINE Neg 01/04/2023 04:15 AM    PHENCYCLIDINESCREENURINE Neg 01/04/2023 04:15 AM    ETOH 186 01/04/2023 04:15 AM     Lab Results   Component Value Date/Time    TSH 2.550 12/23/2020 12:15 PM     No results found for: LITHIUM  No results found for: VALPROATE, CBMZ    RISK ASSESSMENT: low    Treatment Plan:  Reviewed current Medications with the patient. Risks, benefits, side effects, drug-to-drug interactions and alternatives to treatment were discussed. Collateral information: pending   CD evaluation THC  Encourage patient to attend group and other milieu activities.   Discharge planning discussed with the patient and treatment team.    PSYCHOTHERAPY/COUNSELING:  [x] Therapeutic interview  [x] Supportive  [] CBT  [] Ongoing  [] Other  Patient was seen 1:1 for 20 minutes, other than E&M time spent, focusing on      - coping skills techniques     - Anxiety management techniques discussed including deep breathing exercise and PMR     - discussing patients strength and weakness       - Focusing on negative cognition and maladaptive thoughts, which is feeding and maintaining the depression symptoms       [x] Patient continues to need, on a daily basis, active treatment furnished directly by or requiring the supervision of inpatient psychiatric personnel      Anticipated Length of stay: tomorrow         Physician Signature: Electronically signed by Ellie Tsai MD on 1/7/2023 at 9:59 AM

## 2023-01-08 VITALS
RESPIRATION RATE: 18 BRPM | OXYGEN SATURATION: 99 % | DIASTOLIC BLOOD PRESSURE: 60 MMHG | TEMPERATURE: 98.2 F | HEART RATE: 83 BPM | SYSTOLIC BLOOD PRESSURE: 114 MMHG | BODY MASS INDEX: 27.32 KG/M2 | WEIGHT: 170 LBS | HEIGHT: 66 IN

## 2023-01-08 LAB — VALPROIC ACID LEVEL: 25.1 UG/ML (ref 50–100)

## 2023-01-08 PROCEDURE — 36415 COLL VENOUS BLD VENIPUNCTURE: CPT

## 2023-01-08 PROCEDURE — 80164 ASSAY DIPROPYLACETIC ACD TOT: CPT

## 2023-01-08 PROCEDURE — 6370000000 HC RX 637 (ALT 250 FOR IP): Performed by: PSYCHIATRY & NEUROLOGY

## 2023-01-08 RX ORDER — GABAPENTIN 100 MG/1
100 CAPSULE ORAL NIGHTLY
Qty: 30 CAPSULE | Refills: 0 | Status: SHIPPED | OUTPATIENT
Start: 2023-01-08 | End: 2023-02-07

## 2023-01-08 RX ORDER — DIVALPROEX SODIUM 500 MG/1
500 TABLET, EXTENDED RELEASE ORAL DAILY
Qty: 30 TABLET | Refills: 1 | Status: SHIPPED | OUTPATIENT
Start: 2023-01-09

## 2023-01-08 RX ADMIN — DIVALPROEX SODIUM 500 MG: 500 TABLET, EXTENDED RELEASE ORAL at 08:46

## 2023-01-08 RX ADMIN — MICONAZOLE NITRATE: 2 POWDER TOPICAL at 08:48

## 2023-01-08 NOTE — PLAN OF CARE
Patient is out and social. Denies both anxiety and depression. Denies SI/HI and AVH. Pt states she had a good day. Pt showered. Pt denies further needs at this time. Problem: Self Harm/Suicidality  Goal: Will have no self-injury during hospital stay  Description: INTERVENTIONS:  1. Ensure constant observer at bedside with Q15M safety checks  2. Maintain a safe environment  3. Secure patient belongings  4. Ensure family/visitors adhere to safety recommendations  5. Ensure safety tray has been added to patient's diet order  6. Every shift and PRN: Re-assess suicidal risk via Frequent Screener    1/7/2023 2014 by Regan Rubio RN  Outcome: Progressing  1/7/2023 1502 by Melinda Arriaza RN  Outcome: Progressing  Flowsheets (Taken 1/7/2023 1457)  Will have no self-injury during hospital stay:   Ensure constant observer at bedside with Q15M safety checks   Ensure family/visitors adhere to safety recommendations   Every shift and PRN: Re-assess suicidal risk via Frequent Screener   Maintain a safe environment   Secure patient belongings   Ensure safety tray has been added to patient's diet order     Problem: Anxiety  Goal: Will report anxiety at manageable levels  Description: INTERVENTIONS:  1. Administer medication as ordered  2. Teach and rehearse alternative coping skills  3. Provide emotional support with 1:1 interaction with staff  1/7/2023 2014 by Regan Rubio RN  Outcome: Progressing  1/7/2023 1502 by Melinda Arriaza RN  Outcome: Progressing  Flowsheets (Taken 1/7/2023 1457)  Will report anxiety at manageable levels:   Administer medication as ordered   Provide emotional support with 1:1 interaction with staff   Teach and rehearse alternative coping skills     Problem: Coping  Goal: Pt/Family able to verbalize concerns and demonstrate effective coping strategies  Description: INTERVENTIONS:  1. Assist patient/family to identify coping skills, available support systems and cultural and spiritual values  2. Provide emotional support, including active listening and acknowledgement of concerns of patient and caregivers  3. Reduce environmental stimuli, as able  4. Instruct patient/family in relaxation techniques, as appropriate  5. Assess for spiritual pain/suffering and initiate Spiritual Care, Psychosocial Clinical Specialist consults as needed  1/7/2023 2014 by Kan Herzog RN  Outcome: Progressing  1/7/2023 1502 by Israel Renee RN  Outcome: Progressing  Flowsheets (Taken 1/7/2023 1457)  Patient/family able to verbalize anxieties, fears, and concerns, and demonstrate effective coping:   Assist patient/family to identify coping skills, available support systems and cultural and spiritual values   Provide emotional support, including active listening and acknowledgement of concerns of patient and caregivers   Reduce environmental stimuli, as able     Problem: Decision Making  Goal: Pt/Family able to effectively weigh alternatives and participate in decision making related to treatment and care  Description: INTERVENTIONS:  1. Determine when there are differences between patient's view, family's view, and healthcare provider's view of condition  2. Facilitate patient and family articulation of goals for care  3. Help patient and family identify pros/cons of alternative solutions  4. Provide information as requested by patient/family  5. Respect patient/family right to receive or not to receive information  6. Serve as a liaison between patient and family and health care team  7.  Initiate Consults from Ethics, Palliative Care or initiate [facility] as is appropriate  1/7/2023 2014 by Kan Herzog RN  Outcome: Progressing  1/7/2023 1502 by Israel Renee RN  Outcome: Progressing  Flowsheets (Taken 1/7/2023 1457)  Patient/family able to effectively weigh alternatives and participate in decision making related to treatment and care:   Determine when there are differences between patient's view, family's view, and healthcare provider's view of condition   Facilitate patient and family articulation of goals for care   Help patient and family identify pros/cons of alternative solutions   Provide information as requested by patient/family     Problem: Depression/Self Harm  Goal: Effect of psychiatric condition will be minimized and patient will be protected from self harm  Description: INTERVENTIONS:  1. Assess impact of patient's symptoms on level of functioning, self care needs and offer support as indicated  2. Assess patient/family knowledge of depression, impact on illness and need for teaching  3. Provide emotional support, presence and reassurance  4. Assess for possible suicidal thoughts or ideation. If patient expresses suicidal thoughts or statements do not leave alone, initiate Suicide Precautions, move to a room close to the nursing station and obtain sitter  5. Initiate consults as appropriate with Mental Health Professional, Spiritual Care, Psychosocial CNS, and consider a recommendation to the LIP for a Psychiatric Consultation  1/7/2023 2014 by Joy Romo RN  Outcome: Progressing  Flowsheets (Taken 1/7/2023 2014)  Effect of psychiatric condition will be minimized and patient will be protected from self harm:   Assess impact of patients symptoms on level of functioning, self care needs and offer support as indicated   Assess patient/family knowledge of depression, impact on illness and need for teaching   Provide emotional support, presence and reassurance   Assess for suicidal thoughts or ideation.  If patient expresses suicidal thoughts or statements do not leave alone, initiate Suicide Precautions, move near nurse station, obtain sitter   Initiate consults as appropriate with Mental Health Professional, Spiritual Care, Psychosocial CNS, and consider a recommendation to the LIP for a Psychiatric Consultation  1/7/2023 1502 by Kvng Barfield RN  Outcome: Progressing  Flowsheets  Taken 1/7/2023 1502  Effect of psychiatric condition will be minimized and patient will be protected from self harm:   Assess impact of patients symptoms on level of functioning, self care needs and offer support as indicated   Assess patient/family knowledge of depression, impact on illness and need for teaching   Provide emotional support, presence and reassurance   Assess for suicidal thoughts or ideation. If patient expresses suicidal thoughts or statements do not leave alone, initiate Suicide Precautions, move near nurse station, obtain sitter   Initiate consults as appropriate with Mental Health Professional, Spiritual Care, Psychosocial CNS, and consider a recommendation to the LIP for a Psychiatric Consultation  Taken 1/7/2023 0327  Effect of psychiatric condition will be minimized and patient will be protected from self harm:   Assess impact of patients symptoms on level of functioning, self care needs and offer support as indicated   Assess patient/family knowledge of depression, impact on illness and need for teaching   Provide emotional support, presence and reassurance   Assess for suicidal thoughts or ideation. If patient expresses suicidal thoughts or statements do not leave alone, initiate Suicide Precautions, move near nurse station, obtain sitter   Initiate consults as appropriate with Mental Health Professional, Spiritual Care, Psychosocial CNS, and consider a recommendation to the LIP for a Psychiatric Consultation     Problem: Involuntary Admit  Goal: Will cooperate with staff recommendations and doctor's orders and will demonstrate appropriate behavior  Description: INTERVENTIONS:  1. Treat underlying conditions and offer medication as ordered  2. Educate regarding involuntary admission procedures and rules  3.  Contain excessive/inappropriate behavior per unit and hospital policies  4/0/1170 [id number] by Felisa Rome RN  Outcome: Progressing  1/7/2023 1502 by Hermelindo Fajardo RN  Outcome: Progressing  Flowsheets (Taken 1/7/2023 2865)  Will cooperate with staff recommendations and doctor's orders and will demonstrate appropriate behavior:   Treat underlying conditions and offer medication as ordered   Contain excessive/inappropriate behavior per unit and hospital policies   Educate regarding involuntary admission procedures and rules

## 2023-01-08 NOTE — PROGRESS NOTES
Reviewed discharge instructions with patient. Pt. Verbalized understanding. Denies SI/HI/AVH. Ambulatory off unit.       Pt is aware [facility] will get in contact about follow up appt

## 2023-01-08 NOTE — GROUP NOTE
Group Therapy Note    Date: 1/8/2023    Group Start Time: 1000  Group End Time: 1050  Group Topic: Psychoeducation    [facility]    Ty Erwin        Group Therapy Note    Attendees: 13/25       Patient's Goal:  \"Stay positive, go home and keep the same routine\"    Notes:  Patient attended the 1000 skills group. Patient was talkative, sociable and she participated well in group. Status After Intervention:  Improved    Participation Level:  Active Listener    Participation Quality: Appropriate      Speech:  normal      Thought Process/Content: Logical      Affective Functioning: Congruent      Mood:  improved      Level of consciousness:  Alert      Response to Learning: Able to retain information      Endings: None Reported    Modes of Intervention: Education, Socialization, and Activity      Discipline Responsible: Psychoeducational Specialist      Signature:  Ty Erwin

## 2023-01-08 NOTE — PROGRESS NOTES
Morning Community Meeting Topics    Clive Bush attended the morning community meeting on 1/8/23. Topics discussed today     [x] Introduction  Day of the week and date  Mask distribution  Current mask requirements  [x]Teams  Explanation of  Green and Blue team criteria  Nurses assigned to each team for today  Explanation about green and blue paper  Date  Patient's Name  Patient's Nurse  Goals  [x] Visitation  Announce the visiting hours for the day  Announce which team is allowed to have visitors for the day  Review any updated Covid 19 requirements for visitors during visitation  Vaccine Card or negative Covid test within 48 hours of visit  State Identification  Patients are reminded to alert the  at least 1 hour before visitation   [x] Unit Orientation  Coffee use  Phone location and etiquette  Shower locations  [place] and dryer location and process  Common area expectations  Staff rounds expectation  [x] Meals   Educate patient to the menu  The patient is encouraged to fill out the menu to get preferences at mealtime  The patient is educated that if they do not fill out the menu, they will get the standard tray  The coffee pot is decaf, patient encouraged to order regular coffee from menu.   Educate patient to the meal process  Patient encouraged to eat snacks provided twice daily  Snacks may stay in patient room     [x] Discharge Process  Discharge expectations  Fill out the survey after discharge   [x] Hygiene  Daily showers encouraged  Showers availability discussed   Daily dressing encouraged  Discussed wearing street clothing  Education provided on where to place linens and clothing  Linens in the hamper  personal clothing does not go into the linen hamper  [x] Group   Patient encouraged to attend group provided  Time of Group Meetings discussed  Gentle reminder that attendance is a Physician order  [x] Movement  Chair exercises completed  Stretching completed  Notes: Goal - \"Stay positive, go home and keep the same routine\" Electronically signed by Ty Erwin, [address] on 1/8/2023 at 9:50 AM

## 2023-01-08 NOTE — DISCHARGE INSTRUCTIONS
Keep all follow up appointments, take medications as ordered, utilize positive supports, abstain from use of alcohol and drugs. If symptoms return or you feel at risk to yourself or others, please call 911, return the nearest emergency room, or call your local crisis hotline:  [facility]: [phone]: [phone] [address]: [phone]     Due to the [address] [facility] is not currently available. For assistance with quitting smoking please go to https://smokefree.gov. A prescription for an FDA-approved tobacco cessation medication was offered at discharge and the patient refused. Dorota Viera or Rajan Mercer will get back to you this week about a follow up appointment. Patient declined the flu shot at discharge.

## 2023-01-08 NOTE — DISCHARGE SUMMARY
DISCHARGE SUMMARY      Patient ID:  Shane Bonds  [id number]  27 y.o.  1990      Admit date: 1/4/2023    Discharge date and time: 1/8/2023    Admitting Physician: Martir Barrientos MD     Discharge Physician: Dr Lisa Leyden MD    Admission Diagnoses: Severe major depression ([facility] [F32.2]  Major depressive disorder, severe ([facility] [F32.2]    Admission Condition: poor    Discharged Condition: stable    Admission Circumstance:      Pt reports she had a fight with her boyfriend last night after consuming alcohol. She began having thoughts of suicide, she became fearful of acting on thos thoughts due to past attempt and so called 911 herself. She states she has been more depressed, sleeping much of day, has little interest in activities, She had a recent job loss and this is a stressors as well as leaving a lot of open time. She denies symptoms of psychosis and no overt symptoms of same are noted. HISTORY OF PRESENT ILLNESS:       The patient is a 27 y.o. female live with her BF, unemployed. H/O addiction in the past, last admit was in 2020  Pt report that she has been depressed for the past few months. No specific stressor. Was thinking about suicide, no specific plan but was scared of acting on those thoughts. Called 911 and came here. Duration:  Severity: Rating mood to be around 2/10 (10- good)  Quality:melancholic  Content: Hopeless, worthless and helpless feeling  Suicidal thoughts - passive  Associated symptoms:  Poor concentration, anhedonia, decrease motivation  Sleep - increase and appetite- good     Stressors: denies     The patient is not currently receiving care for the above psychiatric illness. Medications Prior to Admission:   Prescriptions Prior to Admission   Medications Prior to Admission: gabapentin (NEURONTIN) 300 MG capsule, Take 1 capsule by mouth nightly for 90 days.         Compliance: has been taking neurontin in the past - not in the past few months     Psychiatric Review of Systems       Depression: yes     Cammie or Hypomania:  yes- mood swings, anger irritable, frustrated, impulsive     Panic Attacks:  no     Phobias:  no     Obsessions and Compulsions:  no     PTSD : denies     Hallucinations:  no     Delusions:  no     Substance Abuse History:  Pt report drinking once a week - 6-8 beers, last drink was 2 days ago. No drugs, sober from cocaine use- for few weeks. MJ occasionaly     Past Psychiatric History:  Prior Diagnosis:  Substance disorders:  Opioid use disorder, ? Cocaine use disorder  Psychiatrist: no  Therapist:no  Hospitalization: no, denies  Hx of Suicidal Attempts: yes  Hx of violence:  no  ECT: no  Previous discontinued Psychiatric Med Trials: n/a      PAST MEDICAL/PSYCHIATRIC HISTORY:   Past Medical History:   Diagnosis Date    Hepatitis C 12/25/2014    Polysubstance abuse (HCC)     Hx opiates, cocaine, ETOH    Restless legs syndrome (RLS)        FAMILY/SOCIAL HISTORY:  History reviewed. No pertinent family history. Social History     Socioeconomic History    Marital status: Single     Spouse name: Not on file    Number of children: Not on file    Years of education: Not on file    Highest education level: Not on file   Occupational History    Not on file   Tobacco Use    Smoking status: Every Day     Packs/day: 1.00     Years: 15.00     Pack years: 15.00     Types: Cigarettes     Start date: 6/5/2005    Smokeless tobacco: Never   Substance and Sexual Activity    Alcohol use:  Yes     Alcohol/week: 12.0 standard drinks     Types: 12 Cans of beer per week     Comment: daily    Drug use: No     Types: Marijuana (Nataliya Rodriguez), IV, Cocaine, Opiates      Comment: weed daily    Sexual activity: Yes     Partners: Male   Other Topics Concern    Not on file   Social History Narrative    Not on file     Social Determinants of Health     Financial Resource Strain: Not on file   Food Insecurity: Not on file   Transportation Needs: Not on file   Physical Activity: Not on file   Stress: Not on file   Social Connections: Not on file   Intimate Partner Violence: Not on file   Housing Stability: Not on file       MEDICATIONS:    Current Facility-Administered Medications:     gabapentin (NEURONTIN) capsule 100 mg, 100 mg, Oral, Nightly, Mary Howell MD, 100 mg at 01/07/23 2058    divalproex (DEPAKOTE ER) extended release tablet 500 mg, 500 mg, Oral, Daily, Mary Howell MD, 500 mg at 01/08/23 0846    miconazole (MICOTIN) 2 % powder, , Topical, BID, OTTO Woods - CNP, Given at 01/08/23 0848    acetaminophen (TYLENOL) tablet 650 mg, 650 mg, Oral, Q4H PRN, Mary Howell MD    magnesium hydroxide (MILK OF MAGNESIA) 400 MG/5ML suspension 30 mL, 30 mL, Oral, Daily PRN, Mary Howell MD    aluminum & magnesium hydroxide-simethicone (MAALOX) 200-200-20 MG/5ML suspension 30 mL, 30 mL, Oral, PRN, Mary Howell MD    haloperidol (HALDOL) tablet 5 mg, 5 mg, Oral, Q6H PRN **OR** haloperidol lactate (HALDOL) injection 5 mg, 5 mg, IntraMUSCular, Q6H PRN, Mary Howell MD    benztropine mesylate (COGENTIN) injection 2 mg, 2 mg, IntraMUSCular, BID PRN, Mary Howell MD    nicotine (NICODERM CQ) 21 MG/24HR 1 patch, 1 patch, TransDERmal, Daily, Mary Howell MD, 1 patch at 01/08/23 0846    hydrOXYzine pamoate (VISTARIL) capsule 50 mg, 50 mg, Oral, Q6H PRN, 50 mg at 01/05/23 1146 **OR** hydrOXYzine (VISTARIL) injection 50 mg, 50 mg, IntraMUSCular, Q6H PRN, Mary Howell MD    Examination:  /60   Pulse 83   Temp 98.2 °F (36.8 °C)   Resp 18   Ht 5' 6\" (1.676 m)   Wt 170 lb (77.1 kg)   SpO2 99%   BMI 27.44 kg/m²   Gait - steady    HOSPITAL COURSE[de-identified]  Following admission to the hospital, patient had a complete physical exam and blood work up  Patient was monitored closely with suicide precaution  Patient was started on meds as listed below  Was encouraged to participate in group and other milieu activity  Patient started to feel better with this combination of treatment. Significant progress in the symptoms since admission. Mood better, with the score of 2/10 - bad  No AVH or paranoid thoughts  No Hopeless or worthless feeling  No active SI/HI  Appetite:  [x] Normal  [] Increased  [] Decreased    Sleep:       [x] Normal  [] Fair       [] Poor            Energy:    [x] Normal  [] Increased  [] Decreased     SI [] Present  [x] Absent  HI  []Present  [x] Absent   Aggression:  [] yes  [] no  Patient is [x] able  [] unable to CONTRACT FOR SAFETY   Medication side effects(SE):  [x] None(Psych. Meds.) [] Other      Mental Status Examination on discharge:    Level of consciousness:  within normal limits   Appearance:  well-appearing  Behavior/Motor:  no abnormalities noted  Attitude toward examiner:  attentive and good eye contact  Speech:  spontaneous, normal rate and normal volume   Mood: euthymic  Affect:  mood congruent  Thought processes:  coherent   Thought content:  Suicidal Ideation:  denies suicidal ideation  Delusions:  no evidence of delusions  Perceptual Disturbance:  denies any perceptual disturbance  Cognition:  oriented to person, place, and time   Concentration intact  Memory intact  Insight good   Judgement fair   Fund of Knowledge adequate      ASSESSMENT:  Patient symptoms are:  [x] Well controlled  [x] Improving  [] Worsening  [] No change      Diagnosis:  Principal Problem:    [facility]  Resolved Problems:    * No resolved hospital problems. *      LABS:    No results for input(s): WBC, HGB, PLT in the last 72 hours. No results for input(s): NA, K, CL, CO2, BUN, CREATININE, GLUCOSE in the last 72 hours. No results for input(s): BILITOT, ALKPHOS, AST, ALT in the last 72 hours.   Lab Results   Component Value Date/Time    LABAMPH Neg 01/04/2023 04:15 AM    BARBSCNU Neg 01/04/2023 04:15 AM    LABBENZ Neg 01/04/2023 04:15 AM    LABMETH Neg 01/04/2023 04:15 AM    OPIATESCREENURINE Neg 01/04/2023 04:15 AM    PHENCYCLIDINESCREENURINE Neg 01/04/2023 04:15 AM    ETOH 186 01/04/2023 04:15 AM     Lab Results   Component Value Date/Time    TSH 2.550 12/23/2020 12:15 PM     No results found for: LITHIUM  Lab Results   Component Value Date    VALPROATE 25.1 (L) 01/08/2023       RISK ASSESSMENT AT DISCHARGE: Low risk for suicide and homicide. Treatment Plan:  Reviewed current Medications with the patient. Education provided on the complaince with treatment. Risks, benefits, side effects, drug-to-drug interactions and alternatives to treatment were discussed. Encourage patient to attend outpatient follow up appointment and therapy. Patient was advised to call the outpatient provider, visit the nearest ED or call 911 if symptoms are not manageable. Patient's family member was contacted prior to the discharge. Medication List        START taking these medications      divalproex 500 MG extended release tablet  Commonly known as: DEPAKOTE ER  Take 1 tablet by mouth daily  Start taking on: January 9, 2023            CHANGE how you take these medications      gabapentin 100 MG capsule  Commonly known as: NEURONTIN  Take 1 capsule by mouth nightly for 30 days.   What changed:   medication strength  how much to take               Where to Get Your Medications        These medications were sent to [address] [place] [address] [facility]  [address]      Phone: [phone]   divalproex 500 MG extended release tablet  gabapentin 100 MG capsule           Reason for more than one antipsychotic:   [x] N/A  [] 3 failed monotherapy(drugs tried):  [] Cross over to a new antipsychotic  [] Taper to monotherapy from polypharmacy  [] Augmentation of Clozapine therapy due to treatment resistance to single therapy        TIME SPEND - 35 MINUTES TO COMPLETE THE EVALUATION, DISCHARGE SUMMARY, MEDICATION RECONCILIATION AND FOLLOW UP CARE     Signed:  Sera Burrows MD  1/8/2023  8:55 AM

## 2023-01-08 NOTE — DISCHARGE INSTR - DIET

## 2023-01-08 NOTE — GROUP NOTE
Group Therapy Note    Date: 1/8/2023    Group Start Time: 1100  Group End Time: 3814  Group Topic: Group Therapy    ML 3W I    ANUPAMA Carrington        Group Therapy Note    Attendees: 9       Patient's Goal:  To participate in a goal oriented group. Notes:  Patient stated her goal is to work on self improvement. Status After Intervention:  Improved    Participation Level: Active Listener    Participation Quality: Appropriate      Speech:  normal      Thought Process/Content: Logical      Affective Functioning: Congruent      Mood: elevated      Level of consciousness:  Alert      Response to Learning: Able to verbalize current knowledge/experience      Endings: None Reported    Modes of Intervention: Education      Discipline Responsible: /Counselor      Signature:   ANUPAMA Carrington

## 2023-01-09 LAB
EKG ATRIAL RATE: 69 BPM
EKG P AXIS: 54 DEGREES
EKG P-R INTERVAL: 146 MS
EKG Q-T INTERVAL: 394 MS
EKG QRS DURATION: 80 MS
EKG QTC CALCULATION (BAZETT): 422 MS
EKG R AXIS: 85 DEGREES
EKG T AXIS: 64 DEGREES
EKG VENTRICULAR RATE: 69 BPM

## 2023-01-24 ENCOUNTER — OFFICE VISIT (OUTPATIENT)
Dept: FAMILY MEDICINE CLINIC | Age: 33
End: 2023-01-24
Payer: MEDICAID

## 2023-01-24 VITALS
WEIGHT: 184 LBS | BODY MASS INDEX: 29.57 KG/M2 | SYSTOLIC BLOOD PRESSURE: 118 MMHG | RESPIRATION RATE: 18 BRPM | OXYGEN SATURATION: 98 % | HEART RATE: 70 BPM | HEIGHT: 66 IN | DIASTOLIC BLOOD PRESSURE: 80 MMHG

## 2023-01-24 DIAGNOSIS — G25.81 RESTLESS LEG SYNDROME: ICD-10-CM

## 2023-01-24 DIAGNOSIS — F31.9 BIPOLAR DEPRESSION (HCC): Primary | ICD-10-CM

## 2023-01-24 DIAGNOSIS — F11.11 NARCOTIC ABUSE IN REMISSION (HCC): ICD-10-CM

## 2023-01-24 PROBLEM — T39.1X2A INTENTIONAL ACETAMINOPHEN OVERDOSE (HCC): Status: RESOLVED | Noted: 2020-12-29 | Resolved: 2023-01-24

## 2023-01-24 PROBLEM — F19.94 SUBSTANCE INDUCED MOOD DISORDER (HCC): Status: RESOLVED | Noted: 2020-12-28 | Resolved: 2023-01-24

## 2023-01-24 PROBLEM — F14.10 COCAINE ABUSE (HCC): Status: RESOLVED | Noted: 2020-12-28 | Resolved: 2023-01-24

## 2023-01-24 PROBLEM — T39.1X2A OVERDOSE ON TYLENOL, INTENTIONAL SELF-HARM, INITIAL ENCOUNTER (HCC): Status: RESOLVED | Noted: 2020-12-23 | Resolved: 2023-01-24

## 2023-01-24 PROCEDURE — G8484 FLU IMMUNIZE NO ADMIN: HCPCS | Performed by: PHYSICIAN ASSISTANT

## 2023-01-24 PROCEDURE — G8427 DOCREV CUR MEDS BY ELIG CLIN: HCPCS | Performed by: PHYSICIAN ASSISTANT

## 2023-01-24 PROCEDURE — 4004F PT TOBACCO SCREEN RCVD TLK: CPT | Performed by: PHYSICIAN ASSISTANT

## 2023-01-24 PROCEDURE — 1111F DSCHRG MED/CURRENT MED MERGE: CPT | Performed by: PHYSICIAN ASSISTANT

## 2023-01-24 PROCEDURE — 99214 OFFICE O/P EST MOD 30 MIN: CPT | Performed by: PHYSICIAN ASSISTANT

## 2023-01-24 PROCEDURE — G8419 CALC BMI OUT NRM PARAM NOF/U: HCPCS | Performed by: PHYSICIAN ASSISTANT

## 2023-01-24 RX ORDER — GABAPENTIN 300 MG/1
300 CAPSULE ORAL 2 TIMES DAILY
Qty: 60 CAPSULE | Refills: 2 | Status: SHIPPED | OUTPATIENT
Start: 2023-01-24 | End: 2023-02-23

## 2023-01-24 SDOH — ECONOMIC STABILITY: FOOD INSECURITY: WITHIN THE PAST 12 MONTHS, YOU WORRIED THAT YOUR FOOD WOULD RUN OUT BEFORE YOU GOT MONEY TO BUY MORE.: NEVER TRUE

## 2023-01-24 SDOH — ECONOMIC STABILITY: FOOD INSECURITY: WITHIN THE PAST 12 MONTHS, THE FOOD YOU BOUGHT JUST DIDN'T LAST AND YOU DIDN'T HAVE MONEY TO GET MORE.: NEVER TRUE

## 2023-01-24 ASSESSMENT — PATIENT HEALTH QUESTIONNAIRE - PHQ9
1. LITTLE INTEREST OR PLEASURE IN DOING THINGS: 0
10. IF YOU CHECKED OFF ANY PROBLEMS, HOW DIFFICULT HAVE THESE PROBLEMS MADE IT FOR YOU TO DO YOUR WORK, TAKE CARE OF THINGS AT HOME, OR GET ALONG WITH OTHER PEOPLE: 1
8. MOVING OR SPEAKING SO SLOWLY THAT OTHER PEOPLE COULD HAVE NOTICED. OR THE OPPOSITE, BEING SO FIGETY OR RESTLESS THAT YOU HAVE BEEN MOVING AROUND A LOT MORE THAN USUAL: 0
SUM OF ALL RESPONSES TO PHQ QUESTIONS 1-9: 5
9. THOUGHTS THAT YOU WOULD BE BETTER OFF DEAD, OR OF HURTING YOURSELF: 0
SUM OF ALL RESPONSES TO PHQ QUESTIONS 1-9: 5
2. FEELING DOWN, DEPRESSED OR HOPELESS: 1
5. POOR APPETITE OR OVEREATING: 0
6. FEELING BAD ABOUT YOURSELF - OR THAT YOU ARE A FAILURE OR HAVE LET YOURSELF OR YOUR FAMILY DOWN: 0
SUM OF ALL RESPONSES TO PHQ QUESTIONS 1-9: 5
7. TROUBLE CONCENTRATING ON THINGS, SUCH AS READING THE NEWSPAPER OR WATCHING TELEVISION: 0
3. TROUBLE FALLING OR STAYING ASLEEP: 2
SUM OF ALL RESPONSES TO PHQ QUESTIONS 1-9: 5
4. FEELING TIRED OR HAVING LITTLE ENERGY: 2
SUM OF ALL RESPONSES TO PHQ9 QUESTIONS 1 & 2: 1

## 2023-01-24 ASSESSMENT — ENCOUNTER SYMPTOMS
SHORTNESS OF BREATH: 0
WHEEZING: 0
ABDOMINAL PAIN: 0
ABDOMINAL DISTENTION: 0
COLOR CHANGE: 0
CHEST TIGHTNESS: 0
COUGH: 0

## 2023-01-24 ASSESSMENT — SOCIAL DETERMINANTS OF HEALTH (SDOH): HOW HARD IS IT FOR YOU TO PAY FOR THE VERY BASICS LIKE FOOD, HOUSING, MEDICAL CARE, AND HEATING?: NOT HARD AT ALL

## 2023-01-24 NOTE — PROGRESS NOTES
Subjective  Rosemarie Anne, 27 y.o. female presents today with:  Chief Complaint   Patient presents with    Follow-up     General follow up no concerns        HPI  Last OV with me: 1/28/22    History of restless leg. Was recently restarted on 100 mg gabapentin at [facility]. Medication has been working but not lasting. Asking if dose can be adjusted. Previously was on mirapex and requip without good relief. Recently was hospitalized at [facility] 1/4/23 for MDD with mood swings. Discharged home on 1/8/23 in stable condition. Was started on depakote with very good success. Recently had intake at [facility] to continue mental health progress. Denies worsening depression. Sleeping well. Relationship going well. Two children. 4 and 7 y/o. Review of Systems   Constitutional:  Negative for activity change, chills, diaphoresis and fatigue. HENT:  Negative for congestion. Eyes:  Negative for visual disturbance. Respiratory:  Negative for cough, chest tightness, shortness of breath and wheezing. Cardiovascular:  Negative for chest pain, palpitations and leg swelling. Gastrointestinal:  Negative for abdominal distention and abdominal pain. Musculoskeletal:  Negative for arthralgias and gait problem. Skin:  Negative for color change. Neurological:  Negative for dizziness and light-headedness. Psychiatric/Behavioral:  Negative for dysphoric mood and sleep disturbance. The patient is not nervous/anxious.       Past Medical History:   Diagnosis Date    Hepatitis C 12/25/2014    Polysubstance abuse (HCC)     Hx opiates, cocaine, ETOH    Restless legs syndrome (RLS)      Past Surgical History:   Procedure Laterality Date    ANKLE FRACTURE SURGERY Left 1/3/2022    OPEN REDUCTION INTERNAL FIXATION LEFT POSTERIOR MALLEOLUS FRACTURE performed by Laurence Greer MD at [address] [place] [address] History     Socioeconomic History    Marital status: Single     Spouse name: Not on file    Number of children: Not on file    Years of education: Not on file    Highest education level: Not on file   Occupational History    Not on file   Tobacco Use    Smoking status: Every Day     Packs/day: 1.00     Years: 15.00     Pack years: 15.00     Types: Cigarettes     Start date: 6/5/2005    Smokeless tobacco: Never   Substance and Sexual Activity    Alcohol use: Yes     Alcohol/week: 12.0 standard drinks     Types: 12 Cans of beer per week     Comment: daily    Drug use: No     Types: Marijuana (Geno Quale), IV, Cocaine, Opiates      Comment: weed daily    Sexual activity: Yes     Partners: Male   Other Topics Concern    Not on file   Social History Narrative    Not on file     Social Determinants of Health     Financial Resource Strain: Low Risk     Difficulty of Paying Living Expenses: Not hard at all   Food Insecurity: No Food Insecurity    Worried About Running Out of Food in the Last Year: Never true    Ran Out of Food in the Last Year: Never true   Transportation Needs: Not on file   Physical Activity: Not on file   Stress: Not on file   Social Connections: Not on file   Intimate Partner Violence: Not on file   Housing Stability: Not on file     No family history on file. Allergies   Allergen Reactions    No Known Allergies Other (See Comments)     Current Outpatient Medications   Medication Sig Dispense Refill    gabapentin (NEURONTIN) 300 MG capsule Take 1 capsule by mouth in the morning and at bedtime for 30 days. 60 capsule 2    divalproex (DEPAKOTE ER) 500 MG extended release tablet Take 1 tablet by mouth daily 30 tablet 1     No current facility-administered medications for this visit. PMH, Surgical Hx, Family Hx, and Social Hx reviewed and updated. Health Maintenance reviewed.     Objective  Vitals:    01/24/23 1120   BP: 118/80   Site: Right Upper Arm   Position: Sitting   Cuff Size: Medium Adult   Pulse: 70   Resp: 18   SpO2: 98%   Weight: 170 lb (77.1 kg)   Height: 5' 6\" (1.676 m) BP Readings from Last 3 Encounters:   01/24/23 118/80   01/08/23 114/60   01/28/22 118/78     Wt Readings from Last 3 Encounters:   01/24/23 170 lb (77.1 kg)   01/04/23 170 lb (77.1 kg)   03/21/22 170 lb (77.1 kg)     Physical Exam  Vitals reviewed. HENT:      Head: Normocephalic and atraumatic. Right Ear: External ear normal.      Left Ear: External ear normal.      Nose: Nose normal.   Cardiovascular:      Rate and Rhythm: Normal rate and regular rhythm. Pulmonary:      Effort: Pulmonary effort is normal.      Breath sounds: Normal breath sounds. Musculoskeletal:         General: No signs of injury. Right lower leg: No edema. Left lower leg: No edema. Skin:     General: Skin is warm and dry. Neurological:      General: No focal deficit present. Mental Status: She is alert and oriented to person, place, and time. Psychiatric:         Mood and Affect: Mood normal.         Thought Content: Thought content normal.         Judgment: Judgment normal.     Assessment & Plan   Nicci Stroud was seen today for follow-up. Diagnoses and all orders for this visit:    Bipolar depression ([facility]    Restless leg syndrome  -     gabapentin (NEURONTIN) 300 MG capsule; Take 1 capsule by mouth in the morning and at bedtime for 30 days. Narcotic abuse in remission ([address]    Start gabapentin for RLS. No drug use, monitoring. Depression is stable on medication, has plans on establishing with [facility]. Plans on getting refill via [address] [place] [address] Street. 6 month follow up. Orders Placed This Encounter   Medications    gabapentin (NEURONTIN) 300 MG capsule     Sig: Take 1 capsule by mouth in the morning and at bedtime for 30 days. Dispense:  60 capsule     Refill:  2     Medications Discontinued During This Encounter   Medication Reason    gabapentin (NEURONTIN) 100 MG capsule REORDER     Return in about 6 months (around 7/24/2023) for follow up in office.       Reviewed with the patient: current clinical status, medications, activities and diet. Side effects, adverse effects of the medication prescribed today, as well as treatment plan/ rationale and result expectations have been discussed with the patient who expresses understanding and desires to proceed. Close follow up to evaluate treatment results and for coordination of care. I have reviewed the patient's medical history in detail and updated the computerized patient record.     Nina Le PA-C

## 2023-02-07 RX ORDER — DIVALPROEX SODIUM 500 MG/1
500 TABLET, EXTENDED RELEASE ORAL DAILY
Qty: 30 TABLET | Refills: 5 | Status: SHIPPED | OUTPATIENT
Start: 2023-02-07

## 2023-02-07 NOTE — TELEPHONE ENCOUNTER
Patient requesting a refill. Patient states that she took her last pill today.     LOV  1/24/2023    Callback # [phone]

## 2023-02-28 ENCOUNTER — TELEPHONE (OUTPATIENT)
Dept: FAMILY MEDICINE CLINIC | Age: 33
End: 2023-02-28

## 2023-02-28 NOTE — TELEPHONE ENCOUNTER
Patient states she feels like her Gabapentin needs to be increased at night. She is currently taking 300 bid.     Please advise    Callback [phone]

## 2023-03-01 NOTE — TELEPHONE ENCOUNTER
Can we see if she wants to keep the 300 mg during the day? We can bump to 400 mg nightly or go to 600 mg.

## 2023-07-17 DIAGNOSIS — G25.81 RESTLESS LEG SYNDROME: ICD-10-CM

## 2023-07-17 RX ORDER — GABAPENTIN 300 MG/1
300 CAPSULE ORAL 3 TIMES DAILY
Qty: 90 CAPSULE | Refills: 2 | Status: SHIPPED | OUTPATIENT
Start: 2023-07-17 | End: 2023-10-15

## 2023-07-17 RX ORDER — DIVALPROEX SODIUM 500 MG/1
500 TABLET, EXTENDED RELEASE ORAL DAILY
Qty: 30 TABLET | Refills: 5 | Status: SHIPPED | OUTPATIENT
Start: 2023-07-17

## 2023-08-01 ENCOUNTER — TELEPHONE (OUTPATIENT)
Dept: FAMILY MEDICINE CLINIC | Age: 33
End: 2023-08-01

## 2023-08-01 NOTE — TELEPHONE ENCOUNTER
Patient states that the current dosage of gabapentin 300 mg is not working. She is requesting an increase.     Callback # [phone]

## 2023-08-03 ENCOUNTER — TELEPHONE (OUTPATIENT)
Dept: FAMILY MEDICINE CLINIC | Age: 33
End: 2023-08-03

## 2023-08-28 NOTE — PLAN OF CARE
Voiced that mood feels more stable and she feels the medications are already helping. Feels she and BF have their needs met with his family they live with. Patient feels some stress regarding tension between she and uncle who lives with her grandparents who are raising her child. Admits her H/O addiction led to her not having custody of her child. Worries the uncle is mean to her grandmother. Affect reactive and appropriate. Denies SI, HI, or hallucinations. Problem: Self Harm/Suicidality  Goal: Will have no self-injury during hospital stay  Description: INTERVENTIONS:  1. Ensure constant observer at bedside with Q15M safety checks  2. Maintain a safe environment  3. Secure patient belongings  4. Ensure family/visitors adhere to safety recommendations  5. Ensure safety tray has been added to patient's diet order  6. Every shift and PRN: Re-assess suicidal risk via Frequent Screener    1/6/2023 2009 by OTTO Nguyen  Outcome: Progressing  1/6/2023 2005 by OTTO Nguyen  Outcome: Progressing     Problem: Anxiety  Goal: Will report anxiety at manageable levels  Description: INTERVENTIONS:  1. Administer medication as ordered  2. Teach and rehearse alternative coping skills  3. Provide emotional support with 1:1 interaction with staff  1/6/2023 2009 by OTTO Nguyen  Outcome: Progressing  1/6/2023 2005 by OTTO Nguyen  Outcome: Progressing  Flowsheets (Taken 1/6/2023 1913)  Will report anxiety at manageable levels:   Administer medication as ordered   Provide emotional support with 1:1 interaction with staff     Problem: Coping  Goal: Pt/Family able to verbalize concerns and demonstrate effective coping strategies  Description: INTERVENTIONS:  1. Assist patient/family to identify coping skills, available support systems and cultural and spiritual values  2.  Provide emotional support, including active listening and acknowledgement of concerns of patient and caregivers  3. Reduce environmental stimuli, as able  4. Instruct patient/family in relaxation techniques, as appropriate  5. Assess for spiritual pain/suffering and initiate Spiritual Care, Psychosocial Clinical Specialist consults as needed  1/6/2023 2009 by OTTO Ospina  Outcome: Progressing  1/6/2023 2005 by OTTO Ospina  Outcome: Progressing  Flowsheets (Taken 1/6/2023 1913)  Patient/family able to verbalize anxieties, fears, and concerns, and demonstrate effective coping:   Assist patient/family to identify coping skills, available support systems and cultural and spiritual values   Provide emotional support, including active listening and acknowledgement of concerns of patient and caregivers   Reduce environmental stimuli, as able     Problem: Decision Making  Goal: Pt/Family able to effectively weigh alternatives and participate in decision making related to treatment and care  Description: INTERVENTIONS:  1. Determine when there are differences between patient's view, family's view, and healthcare provider's view of condition  2. Facilitate patient and family articulation of goals for care  3. Help patient and family identify pros/cons of alternative solutions  4. Provide information as requested by patient/family  5. Respect patient/family right to receive or not to receive information  6. Serve as a liaison between patient and family and health care team  7. Initiate Consults from Ethics, Palliative Care or initiate [facility] as is appropriate  1/6/2023 2009 by OTTO Ospina  Outcome: Progressing  1/6/2023 2005 by OTTO Ospina  Outcome: Progressing  Flowsheets (Taken 1/6/2023 1913)  Patient/family able to effectively weigh alternatives and participate in decision making related to treatment and care:    Facilitate patient and family articulation of goals for care   Help patient and family identify pros/cons of alternative solutions Problem: Depression/Self Harm  Goal: Effect of psychiatric condition will be minimized and patient will be protected from self harm  Description: INTERVENTIONS:  1. Assess impact of patient's symptoms on level of functioning, self care needs and offer support as indicated  2. Assess patient/family knowledge of depression, impact on illness and need for teaching  3. Provide emotional support, presence and reassurance  4. Assess for possible suicidal thoughts or ideation. If patient expresses suicidal thoughts or statements do not leave alone, initiate Suicide Precautions, move to a room close to the nursing station and obtain sitter  5. Initiate consults as appropriate with Mental Health Professional, Spiritual Care, Psychosocial CNS, and consider a recommendation to the LIP for a Psychiatric Consultation  1/6/2023 2009 by OTTO Ernst CNS  Outcome: Progressing  1/6/2023 2005 by OTTO Ernst  Outcome: Progressing  Flowsheets  Taken 1/6/2023 2005  Effect of psychiatric condition will be minimized and patient will be protected from self harm:   Assess impact of patients symptoms on level of functioning, self care needs and offer support as indicated   Assess patient/family knowledge of depression, impact on illness and need for teaching   Provide emotional support, presence and reassurance  Taken 1/6/2023 1913  Effect of psychiatric condition will be minimized and patient will be protected from self harm:   Assess impact of patients symptoms on level of functioning, self care needs and offer support as indicated   Assess patient/family knowledge of depression, impact on illness and need for teaching   Provide emotional support, presence and reassurance     Problem: Involuntary Admit  Goal: Will cooperate with staff recommendations and doctor's orders and will demonstrate appropriate behavior  Description: INTERVENTIONS:  1. Treat underlying conditions and offer medication as ordered  2. Educate regarding involuntary admission procedures and rules  3.  Contain excessive/inappropriate behavior per unit and hospital policies  [id number] by OTTO Bach  Outcome: Progressing  1/6/2023 2005 by OTTO Bach  Outcome: Progressing  Flowsheets (Taken 1/6/2023 1913)  Will cooperate with staff recommendations and doctor's orders and will demonstrate appropriate behavior:   Treat underlying conditions and offer medication as ordered   Contain excessive/inappropriate behavior per unit and hospital policies Bcc Histology Text: There were numerous aggregates of basaloid cells.

## 2023-10-11 DIAGNOSIS — G25.81 RESTLESS LEG SYNDROME: ICD-10-CM

## 2023-10-12 RX ORDER — GABAPENTIN 300 MG/1
300 CAPSULE ORAL 3 TIMES DAILY
Qty: 90 CAPSULE | Refills: 2 | Status: SHIPPED | OUTPATIENT
Start: 2023-10-12 | End: 2024-01-10

## 2024-01-09 DIAGNOSIS — G25.81 RESTLESS LEG SYNDROME: ICD-10-CM

## 2024-01-10 RX ORDER — GABAPENTIN 300 MG/1
CAPSULE ORAL
Qty: 90 CAPSULE | Refills: 2 | OUTPATIENT
Start: 2024-01-10

## 2024-01-10 RX ORDER — DIVALPROEX SODIUM 500 MG/1
500 TABLET, EXTENDED RELEASE ORAL DAILY
Qty: 30 TABLET | Refills: 5 | Status: SHIPPED | OUTPATIENT
Start: 2024-01-10

## 2024-01-23 ENCOUNTER — TELEPHONE (OUTPATIENT)
Dept: FAMILY MEDICINE CLINIC | Age: 34
End: 2024-01-23

## 2024-03-26 ENCOUNTER — OFFICE VISIT (OUTPATIENT)
Age: 34
End: 2024-03-26
Payer: MEDICAID

## 2024-03-26 ENCOUNTER — TELEPHONE (OUTPATIENT)
Dept: FAMILY MEDICINE CLINIC | Age: 34
End: 2024-03-26

## 2024-03-26 VITALS
OXYGEN SATURATION: 98 % | WEIGHT: 170 LBS | SYSTOLIC BLOOD PRESSURE: 130 MMHG | HEART RATE: 110 BPM | RESPIRATION RATE: 94 BRPM | DIASTOLIC BLOOD PRESSURE: 82 MMHG | HEIGHT: 66 IN | BODY MASS INDEX: 27.32 KG/M2

## 2024-03-26 DIAGNOSIS — F17.210 SMOKING GREATER THAN 10 PACK YEARS: ICD-10-CM

## 2024-03-26 DIAGNOSIS — G25.81 RESTLESS LEG SYNDROME: ICD-10-CM

## 2024-03-26 DIAGNOSIS — F31.9 BIPOLAR DEPRESSION (HCC): ICD-10-CM

## 2024-03-26 DIAGNOSIS — E55.9 VITAMIN D DEFICIENCY: ICD-10-CM

## 2024-03-26 DIAGNOSIS — F31.9 BIPOLAR DEPRESSION (HCC): Primary | ICD-10-CM

## 2024-03-26 PROCEDURE — G8427 DOCREV CUR MEDS BY ELIG CLIN: HCPCS | Performed by: FAMILY MEDICINE

## 2024-03-26 PROCEDURE — G8419 CALC BMI OUT NRM PARAM NOF/U: HCPCS | Performed by: FAMILY MEDICINE

## 2024-03-26 PROCEDURE — 4004F PT TOBACCO SCREEN RCVD TLK: CPT | Performed by: FAMILY MEDICINE

## 2024-03-26 PROCEDURE — G8484 FLU IMMUNIZE NO ADMIN: HCPCS | Performed by: FAMILY MEDICINE

## 2024-03-26 PROCEDURE — 99214 OFFICE O/P EST MOD 30 MIN: CPT | Performed by: FAMILY MEDICINE

## 2024-03-26 RX ORDER — GABAPENTIN 300 MG/1
300 CAPSULE ORAL 3 TIMES DAILY
Qty: 90 CAPSULE | Refills: 2 | Status: SHIPPED | OUTPATIENT
Start: 2024-03-26 | End: 2024-03-26 | Stop reason: SDUPTHER

## 2024-03-26 RX ORDER — DIVALPROEX SODIUM 500 MG/1
500 TABLET, EXTENDED RELEASE ORAL DAILY
Qty: 30 TABLET | Refills: 5 | Status: SHIPPED | OUTPATIENT
Start: 2024-03-26 | End: 2024-03-26 | Stop reason: SDUPTHER

## 2024-03-26 RX ORDER — NICOTINE 21 MG/24HR
1 PATCH, TRANSDERMAL 24 HOURS TRANSDERMAL DAILY
Qty: 42 PATCH | Refills: 1 | Status: SHIPPED | OUTPATIENT
Start: 2024-03-26 | End: 2024-03-26 | Stop reason: SDUPTHER

## 2024-03-26 RX ORDER — FOLIC ACID 1 MG/1
1 TABLET ORAL DAILY
Qty: 90 TABLET | Refills: 1 | Status: SHIPPED | OUTPATIENT
Start: 2024-03-26 | End: 2024-03-26 | Stop reason: SDUPTHER

## 2024-03-26 RX ORDER — ERGOCALCIFEROL 1.25 MG/1
50000 CAPSULE ORAL WEEKLY
Qty: 4 CAPSULE | Refills: 4 | Status: SHIPPED | OUTPATIENT
Start: 2024-03-26 | End: 2024-03-26 | Stop reason: SDUPTHER

## 2024-03-26 RX ORDER — ERGOCALCIFEROL 1.25 MG/1
50000 CAPSULE ORAL WEEKLY
COMMUNITY
Start: 2013-07-30 | End: 2024-03-26 | Stop reason: SDUPTHER

## 2024-03-26 SDOH — ECONOMIC STABILITY: INCOME INSECURITY: HOW HARD IS IT FOR YOU TO PAY FOR THE VERY BASICS LIKE FOOD, HOUSING, MEDICAL CARE, AND HEATING?: NOT HARD AT ALL

## 2024-03-26 SDOH — ECONOMIC STABILITY: HOUSING INSECURITY
IN THE LAST 12 MONTHS, WAS THERE A TIME WHEN YOU DID NOT HAVE A STEADY PLACE TO SLEEP OR SLEPT IN A SHELTER (INCLUDING NOW)?: NO

## 2024-03-26 SDOH — ECONOMIC STABILITY: FOOD INSECURITY: WITHIN THE PAST 12 MONTHS, YOU WORRIED THAT YOUR FOOD WOULD RUN OUT BEFORE YOU GOT MONEY TO BUY MORE.: NEVER TRUE

## 2024-03-26 SDOH — ECONOMIC STABILITY: FOOD INSECURITY: WITHIN THE PAST 12 MONTHS, THE FOOD YOU BOUGHT JUST DIDN'T LAST AND YOU DIDN'T HAVE MONEY TO GET MORE.: NEVER TRUE

## 2024-03-26 ASSESSMENT — PATIENT HEALTH QUESTIONNAIRE - PHQ9
SUM OF ALL RESPONSES TO PHQ9 QUESTIONS 1 & 2: 4
2. FEELING DOWN, DEPRESSED OR HOPELESS: MORE THAN HALF THE DAYS
SUM OF ALL RESPONSES TO PHQ QUESTIONS 1-9: 13
8. MOVING OR SPEAKING SO SLOWLY THAT OTHER PEOPLE COULD HAVE NOTICED. OR THE OPPOSITE, BEING SO FIGETY OR RESTLESS THAT YOU HAVE BEEN MOVING AROUND A LOT MORE THAN USUAL: SEVERAL DAYS
SUM OF ALL RESPONSES TO PHQ QUESTIONS 1-9: 13
SUM OF ALL RESPONSES TO PHQ QUESTIONS 1-9: 13
4. FEELING TIRED OR HAVING LITTLE ENERGY: NEARLY EVERY DAY
9. THOUGHTS THAT YOU WOULD BE BETTER OFF DEAD, OR OF HURTING YOURSELF: NOT AT ALL
SUM OF ALL RESPONSES TO PHQ QUESTIONS 1-9: 13
10. IF YOU CHECKED OFF ANY PROBLEMS, HOW DIFFICULT HAVE THESE PROBLEMS MADE IT FOR YOU TO DO YOUR WORK, TAKE CARE OF THINGS AT HOME, OR GET ALONG WITH OTHER PEOPLE: SOMEWHAT DIFFICULT
6. FEELING BAD ABOUT YOURSELF - OR THAT YOU ARE A FAILURE OR HAVE LET YOURSELF OR YOUR FAMILY DOWN: SEVERAL DAYS
7. TROUBLE CONCENTRATING ON THINGS, SUCH AS READING THE NEWSPAPER OR WATCHING TELEVISION: SEVERAL DAYS
1. LITTLE INTEREST OR PLEASURE IN DOING THINGS: MORE THAN HALF THE DAYS
3. TROUBLE FALLING OR STAYING ASLEEP: NEARLY EVERY DAY
5. POOR APPETITE OR OVEREATING: NOT AT ALL

## 2024-03-26 NOTE — PROGRESS NOTES
[facility]  [address] [id number] [phone]     Date of Visit:  3/26/2024    CHIEF COMPLAINT:     Jordyn Tucker is a 33 y.o. female who presents today for an general visit to be evaluated for the following condition(s):  Chief Complaint   Patient presents with    Depression     Follow up pq score = 13     Bipolar     Follow up     Medication Refill     Needs to discuss uping the dose gabapentin, doesn't feel the depakote is working anymore         HISTORY OF PRESENT ILLNESS   Here for follow up Bipolar and RLS. Ran out of medications x 1 month and states that her symptoms have been worse since that time.She get the urge to move her legs but sometimes other parts of her body??. She reports that Neurontin has been helping.  She also reports feeling down, PHQ is positive but denies SI or HI. Was on depakote, thinks it is not working as before.?? Mood is on and off, both her kids are not currently living with her.      REVIEW OF SYSTEM      Constitutional: negative  Eyes: negative for irritation and visual disturbance  Ears, nose, mouth, throat, and face: negative for ear drainage, earaches, and hearing loss  Respiratory: negative for shortness of breath and wheezing  Cardiovascular: negative for chest pain, orthopnea, and syncope  Gastrointestinal: negative for abdominal pain, nausea, and vomiting  Genitourinary:negative for dysuria  Musculoskeletal:negative for muscle weakness and stiff joints  Neurological: negative for gait problems and weakness          REVIEWED INFORMATION      Allergies   Allergen Reactions    No Known Allergies Other (See Comments)       Patient Active Problem List   Diagnosis    Narcotic abuse in remission (HCC)    Bipolar depression (HCC)    Restless leg syndrome    Vitamin D deficiency    Multiple joint pain    Fatigue    Cramp in limb       Past Medical History:   Diagnosis Date    Hepatitis C 12/25/2014

## 2024-03-26 NOTE — TELEPHONE ENCOUNTER
Pt needs medicines resent to [facility]'s, she realized it was the wrong pharmacy. She called to get transferred, they told her to call us. I fixed the pharmacy on my end to [facility] [address] [id number].   -Kishor

## 2024-03-26 NOTE — TELEPHONE ENCOUNTER
Pt called and state that she had an appointment in the office today.    Pt's medications went to [facility] in [place] but should have went to [facility] on [address] in [place].    Pt would like her medications re-sent to [facility].

## 2024-03-28 RX ORDER — GABAPENTIN 300 MG/1
300 CAPSULE ORAL 3 TIMES DAILY
Qty: 90 CAPSULE | Refills: 2 | Status: SHIPPED | OUTPATIENT
Start: 2024-03-28 | End: 2024-06-26

## 2024-03-28 RX ORDER — NICOTINE 21 MG/24HR
1 PATCH, TRANSDERMAL 24 HOURS TRANSDERMAL DAILY
Qty: 42 PATCH | Refills: 1 | Status: SHIPPED | OUTPATIENT
Start: 2024-03-28 | End: 2024-06-20

## 2024-03-28 RX ORDER — DIVALPROEX SODIUM 500 MG/1
500 TABLET, EXTENDED RELEASE ORAL DAILY
Qty: 30 TABLET | Refills: 5 | Status: SHIPPED | OUTPATIENT
Start: 2024-03-28

## 2024-03-28 RX ORDER — FOLIC ACID 1 MG/1
1 TABLET ORAL DAILY
Qty: 90 TABLET | Refills: 1 | Status: SHIPPED | OUTPATIENT
Start: 2024-03-28

## 2024-03-28 RX ORDER — ERGOCALCIFEROL 1.25 MG/1
50000 CAPSULE ORAL WEEKLY
Qty: 4 CAPSULE | Refills: 4 | Status: SHIPPED | OUTPATIENT
Start: 2024-03-28

## 2024-05-09 NOTE — PROGRESS NOTES
Morning Community Meeting Topics    Dru Barrera attended the morning community meeting on 1/5/23. Topics discussed today     [x] Introduction  Day of the week and date  Mask distribution  Current mask requirements  [x]Teams  Explanation of  Green and Blue team criteria  Nurses assigned to each team for today  Explanation about green and blue paper  Date  Patient's Name  Patient's Nurse  Goals  [x] Visitation  Announce the visiting hours for the day  Announce which team is allowed to have visitors for the day  Review any updated Covid 19 requirements for visitors during visitation  Vaccine Card or negative Covid test within 48 hours of visit  State Identification  Patients are reminded to alert the  at least 1 hour before visitation   [x] Unit Orientation  Coffee use  Phone location and etiquette  Shower locations  [place] and dryer location and process  Common area expectations  Staff rounds expectation  [x] Meals   Educate patient to the menu  The patient is encouraged to fill out the menu to get preferences at mealtime  The patient is educated that if they do not fill out the menu, they will get the standard tray  The coffee pot is decaf, patient encouraged to order regular coffee from menu.   Educate patient to the meal process  Patient encouraged to eat snacks provided twice daily  Snacks may stay in patient room     [x] Discharge Process  Discharge expectations  Fill out the survey after discharge   [x] Hygiene  Daily showers encouraged  Showers availability discussed   Daily dressing encouraged  Discussed wearing street clothing  Education provided on where to place linens and clothing  Linens in the hamper  personal clothing does not go into the linen hamper  [x] Group   Patient encouraged to attend group provided  Time of Group Meetings discussed  Gentle reminder that attendance is a Physician order  [x] Movement  Chair exercises completed  Stretching completed  Notes:  GOAL : \" to feel better\" Electronically signed by Elenita Saenz on 1/5/2023 at 11:29 AM Render In Strict Bullet Format?: No Detail Level: Zone Initiate Treatment: Clindamycin lotion QAM, Ketoconazole cream qhs, DCN 100mg bid

## 2024-06-25 ENCOUNTER — TELEPHONE (OUTPATIENT)
Dept: FAMILY MEDICINE CLINIC | Age: 34
End: 2024-06-25

## 2024-09-02 ENCOUNTER — HOSPITAL ENCOUNTER (INPATIENT)
Age: 34
LOS: 4 days | Discharge: HOME | DRG: 753 | End: 2024-09-06
Attending: PSYCHIATRY & NEUROLOGY | Admitting: PSYCHIATRY & NEUROLOGY
Payer: MEDICAID

## 2024-09-02 DIAGNOSIS — F31.9 BIPOLAR DEPRESSION (HCC): Primary | ICD-10-CM

## 2024-09-02 LAB
ALBUMIN SERPL-MCNC: 4.2 G/DL (ref 3.5–4.6)
ALP SERPL-CCNC: 139 U/L (ref 40–130)
ALT SERPL-CCNC: 33 U/L (ref 0–33)
AMPHET UR QL SCN: ABNORMAL
ANION GAP SERPL CALCULATED.3IONS-SCNC: 17 MEQ/L (ref 9–15)
APAP SERPL-MCNC: <5 UG/ML (ref 10–30)
AST SERPL-CCNC: 33 U/L (ref 0–35)
BACTERIA URNS QL MICRO: ABNORMAL /HPF
BARBITURATES UR QL SCN: ABNORMAL
BASOPHILS # BLD: 0 K/UL (ref 0–0.2)
BASOPHILS NFR BLD: 0.3 %
BENZODIAZ UR QL SCN: POSITIVE
BILIRUB SERPL-MCNC: <0.2 MG/DL (ref 0.2–0.7)
BILIRUB UR QL STRIP: NEGATIVE
BUN SERPL-MCNC: 9 MG/DL (ref 6–20)
CALCIUM SERPL-MCNC: 9.1 MG/DL (ref 8.5–9.9)
CANNABINOIDS UR QL SCN: POSITIVE
CHLORIDE SERPL-SCNC: 106 MEQ/L (ref 95–107)
CHOLEST SERPL-MCNC: 209 MG/DL (ref 0–199)
CK SERPL-CCNC: 112 U/L (ref 0–170)
CLARITY UR: CLEAR
CO2 SERPL-SCNC: 23 MEQ/L (ref 20–31)
COCAINE UR QL SCN: POSITIVE
COLOR UR: YELLOW
CREAT SERPL-MCNC: 0.67 MG/DL (ref 0.5–0.9)
DRUG SCREEN COMMENT UR-IMP: ABNORMAL
EOSINOPHIL # BLD: 0.1 K/UL (ref 0–0.7)
EOSINOPHIL NFR BLD: 1.4 %
EPI CELLS #/AREA URNS AUTO: ABNORMAL /HPF (ref 0–5)
ERYTHROCYTE [DISTWIDTH] IN BLOOD BY AUTOMATED COUNT: 12.6 % (ref 11.5–14.5)
ETHANOL PERCENT: 0.11 G/DL
ETHANOLAMINE SERPL-MCNC: 128 MG/DL (ref 0–0.08)
FENTANYL SCREEN, URINE: ABNORMAL
GLOBULIN SER CALC-MCNC: 3 G/DL (ref 2.3–3.5)
GLUCOSE SERPL-MCNC: 85 MG/DL (ref 70–99)
GLUCOSE UR STRIP-MCNC: NEGATIVE MG/DL
HCG SERPL QL: NEGATIVE
HCT VFR BLD AUTO: 44.5 % (ref 37–47)
HDLC SERPL-MCNC: 47 MG/DL (ref 40–59)
HGB BLD-MCNC: 15.1 G/DL (ref 12–16)
HGB UR QL STRIP: NEGATIVE
HYALINE CASTS #/AREA URNS AUTO: ABNORMAL /HPF (ref 0–5)
KETONES UR STRIP-MCNC: NEGATIVE MG/DL
LDLC SERPL CALC-MCNC: 125 MG/DL (ref 0–129)
LEUKOCYTE ESTERASE UR QL STRIP: ABNORMAL
LYMPHOCYTES # BLD: 3.2 K/UL (ref 1–4.8)
LYMPHOCYTES NFR BLD: 31.8 %
MAGNESIUM SERPL-MCNC: 2.1 MG/DL (ref 1.7–2.4)
MCH RBC QN AUTO: 34 PG (ref 27–31.3)
MCHC RBC AUTO-ENTMCNC: 33.9 % (ref 33–37)
MCV RBC AUTO: 100.2 FL (ref 79.4–94.8)
METHADONE UR QL SCN: ABNORMAL
MONOCYTES # BLD: 0.6 K/UL (ref 0.2–0.8)
MONOCYTES NFR BLD: 5.6 %
MUCOUS THREADS URNS QL MICRO: PRESENT /LPF
NEUTROPHILS # BLD: 6.1 K/UL (ref 1.4–6.5)
NEUTS SEG NFR BLD: 60.7 %
NITRITE UR QL STRIP: POSITIVE
OPIATES UR QL SCN: ABNORMAL
OXYCODONE UR QL SCN: ABNORMAL
PCP UR QL SCN: ABNORMAL
PH UR STRIP: 5.5 [PH] (ref 5–9)
PLATELET # BLD AUTO: 299 K/UL (ref 130–400)
POTASSIUM SERPL-SCNC: 3.9 MEQ/L (ref 3.4–4.9)
PROPOXYPH UR QL SCN: ABNORMAL
PROT SERPL-MCNC: 7.2 G/DL (ref 6.3–8)
PROT UR STRIP-MCNC: NEGATIVE MG/DL
RBC # BLD AUTO: 4.44 M/UL (ref 4.2–5.4)
RBC #/AREA URNS HPF: ABNORMAL /HPF (ref 0–2)
SALICYLATES SERPL-MCNC: <0.3 MG/DL (ref 15–30)
SODIUM SERPL-SCNC: 146 MEQ/L (ref 135–144)
SP GR UR STRIP: 1.02 (ref 1–1.03)
TRIGL SERPL-MCNC: 187 MG/DL (ref 0–150)
TSH REFLEX: 1.25 UIU/ML (ref 0.44–3.86)
URINE REFLEX TO CULTURE: ABNORMAL
UROBILINOGEN UR STRIP-ACNC: 0.2 E.U./DL
VALPROATE SERPL-MCNC: <2.8 UG/ML (ref 50–100)
WBC # BLD AUTO: 10.1 K/UL (ref 4.8–10.8)
WBC #/AREA URNS AUTO: ABNORMAL /HPF (ref 0–5)
WBC #/AREA URNS HPF: ABNORMAL /HPF (ref 0–5)

## 2024-09-02 PROCEDURE — 80164 ASSAY DIPROPYLACETIC ACD TOT: CPT

## 2024-09-02 PROCEDURE — 80061 LIPID PANEL: CPT

## 2024-09-02 PROCEDURE — 82550 ASSAY OF CK (CPK): CPT

## 2024-09-02 PROCEDURE — 84703 CHORIONIC GONADOTROPIN ASSAY: CPT

## 2024-09-02 PROCEDURE — 6370000000 HC RX 637 (ALT 250 FOR IP): Performed by: PSYCHIATRY & NEUROLOGY

## 2024-09-02 PROCEDURE — 85025 COMPLETE CBC W/AUTO DIFF WBC: CPT

## 2024-09-02 PROCEDURE — 93005 ELECTROCARDIOGRAM TRACING: CPT | Performed by: PERSONAL EMERGENCY RESPONSE ATTENDANT

## 2024-09-02 PROCEDURE — 80307 DRUG TEST PRSMV CHEM ANLYZR: CPT

## 2024-09-02 PROCEDURE — 2500000003 HC RX 250 WO HCPCS: Performed by: PERSONAL EMERGENCY RESPONSE ATTENDANT

## 2024-09-02 PROCEDURE — 84443 ASSAY THYROID STIM HORMONE: CPT

## 2024-09-02 PROCEDURE — 82077 ASSAY SPEC XCP UR&BREATH IA: CPT

## 2024-09-02 PROCEDURE — 1240000000 HC EMOTIONAL WELLNESS R&B

## 2024-09-02 PROCEDURE — 80179 DRUG ASSAY SALICYLATE: CPT

## 2024-09-02 PROCEDURE — 83735 ASSAY OF MAGNESIUM: CPT

## 2024-09-02 PROCEDURE — 99284 EMERGENCY DEPT VISIT MOD MDM: CPT

## 2024-09-02 PROCEDURE — 80053 COMPREHEN METABOLIC PANEL: CPT

## 2024-09-02 PROCEDURE — 81001 URINALYSIS AUTO W/SCOPE: CPT

## 2024-09-02 PROCEDURE — 80143 DRUG ASSAY ACETAMINOPHEN: CPT

## 2024-09-02 PROCEDURE — 6370000000 HC RX 637 (ALT 250 FOR IP): Performed by: PERSONAL EMERGENCY RESPONSE ATTENDANT

## 2024-09-02 RX ORDER — TRAZODONE HYDROCHLORIDE 50 MG/1
50 TABLET, FILM COATED ORAL NIGHTLY PRN
Status: DISCONTINUED | OUTPATIENT
Start: 2024-09-02 | End: 2024-09-06 | Stop reason: HOSPADM

## 2024-09-02 RX ORDER — LANOLIN ALCOHOL/MO/W.PET/CERES
100 CREAM (GRAM) TOPICAL DAILY
Status: DISCONTINUED | OUTPATIENT
Start: 2024-09-02 | End: 2024-09-06 | Stop reason: HOSPADM

## 2024-09-02 RX ORDER — HYDROXYZINE PAMOATE 50 MG/1
50 CAPSULE ORAL EVERY 6 HOURS PRN
Status: DISCONTINUED | OUTPATIENT
Start: 2024-09-02 | End: 2024-09-02

## 2024-09-02 RX ORDER — LIDOCAINE HYDROCHLORIDE 20 MG/ML
5 INJECTION, SOLUTION INFILTRATION; PERINEURAL ONCE
Status: COMPLETED | OUTPATIENT
Start: 2024-09-02 | End: 2024-09-02

## 2024-09-02 RX ORDER — HALOPERIDOL 5 MG/1
5 TABLET ORAL EVERY 6 HOURS PRN
Status: DISCONTINUED | OUTPATIENT
Start: 2024-09-02 | End: 2024-09-06 | Stop reason: HOSPADM

## 2024-09-02 RX ORDER — LORAZEPAM 2 MG/ML
4 INJECTION INTRAMUSCULAR
Status: DISCONTINUED | OUTPATIENT
Start: 2024-09-02 | End: 2024-09-06 | Stop reason: HOSPADM

## 2024-09-02 RX ORDER — FOLIC ACID 1 MG/1
1 TABLET ORAL DAILY
Status: DISCONTINUED | OUTPATIENT
Start: 2024-09-02 | End: 2024-09-06 | Stop reason: HOSPADM

## 2024-09-02 RX ORDER — HALOPERIDOL 5 MG/ML
5 INJECTION INTRAMUSCULAR EVERY 6 HOURS PRN
Status: DISCONTINUED | OUTPATIENT
Start: 2024-09-02 | End: 2024-09-06 | Stop reason: HOSPADM

## 2024-09-02 RX ORDER — BACITRACIN ZINC 500 [USP'U]/G
OINTMENT TOPICAL ONCE
Status: COMPLETED | OUTPATIENT
Start: 2024-09-02 | End: 2024-09-02

## 2024-09-02 RX ORDER — HYDROXYZINE HYDROCHLORIDE 50 MG/ML
50 INJECTION, SOLUTION INTRAMUSCULAR EVERY 6 HOURS PRN
Status: DISCONTINUED | OUTPATIENT
Start: 2024-09-02 | End: 2024-09-02

## 2024-09-02 RX ORDER — ONDANSETRON 4 MG/1
4 TABLET, ORALLY DISINTEGRATING ORAL EVERY 6 HOURS PRN
Status: DISCONTINUED | OUTPATIENT
Start: 2024-09-02 | End: 2024-09-06 | Stop reason: HOSPADM

## 2024-09-02 RX ORDER — GABAPENTIN 300 MG/1
600 CAPSULE ORAL NIGHTLY
Status: DISCONTINUED | OUTPATIENT
Start: 2024-09-02 | End: 2024-09-06 | Stop reason: HOSPADM

## 2024-09-02 RX ORDER — BENZTROPINE MESYLATE 1 MG/ML
2 INJECTION, SOLUTION INTRAMUSCULAR; INTRAVENOUS 2 TIMES DAILY PRN
Status: DISCONTINUED | OUTPATIENT
Start: 2024-09-02 | End: 2024-09-06 | Stop reason: HOSPADM

## 2024-09-02 RX ORDER — LORAZEPAM 0.5 MG/1
0.5 TABLET ORAL EVERY 4 HOURS PRN
Status: DISCONTINUED | OUTPATIENT
Start: 2024-09-02 | End: 2024-09-06 | Stop reason: HOSPADM

## 2024-09-02 RX ORDER — ACETAMINOPHEN 325 MG/1
650 TABLET ORAL EVERY 4 HOURS PRN
Status: DISCONTINUED | OUTPATIENT
Start: 2024-09-02 | End: 2024-09-06 | Stop reason: HOSPADM

## 2024-09-02 RX ORDER — NICOTINE 21 MG/24HR
1 PATCH, TRANSDERMAL 24 HOURS TRANSDERMAL DAILY
Status: DISCONTINUED | OUTPATIENT
Start: 2024-09-02 | End: 2024-09-06 | Stop reason: HOSPADM

## 2024-09-02 RX ORDER — GABAPENTIN 300 MG/1
300 CAPSULE ORAL DAILY
Status: DISCONTINUED | OUTPATIENT
Start: 2024-09-03 | End: 2024-09-06 | Stop reason: HOSPADM

## 2024-09-02 RX ORDER — LORAZEPAM 1 MG/1
1 TABLET ORAL EVERY 4 HOURS PRN
Status: DISCONTINUED | OUTPATIENT
Start: 2024-09-02 | End: 2024-09-06 | Stop reason: HOSPADM

## 2024-09-02 RX ORDER — LORAZEPAM 2 MG/1
2 TABLET ORAL
Status: DISCONTINUED | OUTPATIENT
Start: 2024-09-02 | End: 2024-09-06 | Stop reason: HOSPADM

## 2024-09-02 RX ORDER — MAGNESIUM HYDROXIDE/ALUMINUM HYDROXICE/SIMETHICONE 120; 1200; 1200 MG/30ML; MG/30ML; MG/30ML
30 SUSPENSION ORAL PRN
Status: DISCONTINUED | OUTPATIENT
Start: 2024-09-02 | End: 2024-09-06 | Stop reason: HOSPADM

## 2024-09-02 RX ADMIN — FOLIC ACID 1 MG: 1 TABLET ORAL at 13:59

## 2024-09-02 RX ADMIN — LORAZEPAM 0.5 MG: 0.5 TABLET ORAL at 20:18

## 2024-09-02 RX ADMIN — Medication 100 MG: at 13:59

## 2024-09-02 RX ADMIN — GABAPENTIN 600 MG: 300 CAPSULE ORAL at 20:18

## 2024-09-02 RX ADMIN — LORAZEPAM 0.5 MG: 0.5 TABLET ORAL at 14:07

## 2024-09-02 RX ADMIN — LIDOCAINE HYDROCHLORIDE 5 ML: 20 INJECTION, SOLUTION INFILTRATION; PERINEURAL at 05:40

## 2024-09-02 RX ADMIN — BACITRACIN ZINC: 500 OINTMENT TOPICAL at 06:17

## 2024-09-02 ASSESSMENT — ENCOUNTER SYMPTOMS
COUGH: 0
SORE THROAT: 0
NAUSEA: 0
SHORTNESS OF BREATH: 0
COLOR CHANGE: 0
RHINORRHEA: 0
ABDOMINAL PAIN: 0
BLOOD IN STOOL: 0
DIARRHEA: 0
VOMITING: 0

## 2024-09-02 ASSESSMENT — SLEEP AND FATIGUE QUESTIONNAIRES
DO YOU HAVE DIFFICULTY SLEEPING: NO
DO YOU USE A SLEEP AID: NO
AVERAGE NUMBER OF SLEEP HOURS: 7
SLEEP PATTERN: NORMAL

## 2024-09-02 ASSESSMENT — PATIENT HEALTH QUESTIONNAIRE - PHQ9
SUM OF ALL RESPONSES TO PHQ QUESTIONS 1-9: 1
2. FEELING DOWN, DEPRESSED OR HOPELESS: SEVERAL DAYS
SUM OF ALL RESPONSES TO PHQ QUESTIONS 1-9: 1
1. LITTLE INTEREST OR PLEASURE IN DOING THINGS: NOT AT ALL
SUM OF ALL RESPONSES TO PHQ QUESTIONS 1-9: 1
SUM OF ALL RESPONSES TO PHQ9 QUESTIONS 1 & 2: 1
SUM OF ALL RESPONSES TO PHQ QUESTIONS 1-9: 1

## 2024-09-02 ASSESSMENT — LIFESTYLE VARIABLES
HOW OFTEN DO YOU HAVE A DRINK CONTAINING ALCOHOL: 4 OR MORE TIMES A WEEK
HOW MANY STANDARD DRINKS CONTAINING ALCOHOL DO YOU HAVE ON A TYPICAL DAY: 3 OR 4

## 2024-09-02 NOTE — ED NOTES
Patient received to [facility] from ER-10. Patient has a strong smell of alcohol, speech slow and slightly slurred. Patient states she did not take any extra pills today, does not provide an answer when asked how much alcohol she consumed. Patient was assisted into the bathroom to attempt to urinate, gait very unsteady. Patient required hands-on assistance for all mobility and transfers due to unsteadiness. She was unable to provide a urine sample. Patient is in psych-safe clothing. Vertical cuts noted to bilateral wrists, no active bleeding. Cuts were sutured while patient was in the main ED. Patient denies pain, denies active thoughts of further self-harm. Patient assisted into bed in FLIP-01, where she promptly fell asleep.

## 2024-09-02 NOTE — ED NOTES
Provisional Diagnosis:   Bipolar Depression     Psychosocial and Contextual Factors:     Lives at home with her fiance and his family   Has 2 children in which she does not have custody of. States her son is with his father and her daughter lives with her grandma   Does not work, recently lost job   3W admission January 2023     C-SSRS Summary:    C-SSRS Suicide Screening1) Within the past month, have you wished you were dead or wished you could go to sleep and not wake up? : Yes2) Have you actually had any thoughts of killing yourself? : Yes3) Have you been thinking about how you might kill yourself? : No4) Have you had these thoughts and had some intention of acting on them? : No5) Have you started to work out or worked out the details of how to kill yourself? Do you intend to carry out this plan? : No6) Have you ever done anything, started to do anything, or prepared to do anything to end your life?: YesDid this occur within the past 3 months? : NoRisk of Suicide: Moderate Risk  C-SSRS Risk AssessmentSuicidal and Self-Injurious Behavior : Actual suicide attempt - Lifetime; Self-injurious behavior without suicidal intent - Past 3 monthsSuicidal Ideation (Most Severe in Past Month): Wish to be dead; Suicidal thoughtsTreatment History: Previous psychiatric diagnosis and treatmentsClinical Status (Recent): Hopelessness; Major depression episodeProtective Factors (Recent): Identifies reasons for living    Patient: calm, cooperative, depressed, withdrawn   Family: none present   Agency: none    Substance Abuse: Drinks alcohol daily, states about 3 or 4 tall cans of beer.     Present Suicidal Behavior:      Verbal: Voices passive suicidal ideation, no plan     Attempt: Denies but has lacerations to bilateral wrist that required sutures     Past Suicidal Behavior:     Verbal:Has several past admissions related to suicidal ideation     Attempt: Has past history of overdose attempt in 2020

## 2024-09-02 NOTE — ED PROVIDER NOTES
[facility] ED  eMERGENCY dEPARTMENT eNCOUnter      Pt Name: Jordyn Tucker  MRN: [id number]  Birthdate 1990  Date of evaluation: 9/2/2024  Provider: FEDERICO Murphy  4:11 AM EDT     My attending is Dr. Dave    HISTORY OF PRESENT ILLNESS    Jordyn Tucker is a 33 y.o. female with PMHx of hepatitis C, polysubstance abuse, restless leg syndrome, bipolar depression presents to the emergency department with mental health evaluation. Pt got into an argument with her fiancé today.  She used his box razors to cut bilateral wrists and told him of this, and he called the ambulance.  Her cousin also states that she kept her Xanax 1 mg bottle in her chair recliner and another family member saw the patient going to the recliner at some point today and she now has 10 missing Xanax.  Patient denies SI, HI, hallucinations. Pt is a daily drinker and polysubstance abuse. They state she recently went missing for 7-10 days and told them she was dope sick from heroin. Gabapentin 600mg x 5 found on patient in baggie (sent to pharmacy)    HPI    Nursing Notes were reviewed.    REVIEW OF SYSTEMS       Review of Systems   Constitutional:  Negative for appetite change, chills and fever.   HENT:  Negative for congestion, rhinorrhea and sore throat.    Respiratory:  Negative for cough and shortness of breath.    Cardiovascular:  Negative for chest pain.   Gastrointestinal:  Negative for abdominal pain, blood in stool, diarrhea, nausea and vomiting.   Genitourinary:  Negative for difficulty urinating.   Musculoskeletal:  Negative for neck stiffness.   Skin:  Negative for color change and rash.   Neurological:  Negative for dizziness, syncope, weakness, light-headedness, numbness and headaches.   Psychiatric/Behavioral:  Positive for self-injury and suicidal ideas.    All other systems reviewed and are negative.    PAST MEDICAL HISTORY     Past Medical History:   Diagnosis Date    Hepatitis C 12/25/2014    Polysubstance

## 2024-09-02 NOTE — ED NOTES
Call to Dr. Hall, reviewed patient, current and past history, labs, EKG, behaviors, medications. Orders received to admit to 3W with standard prns and ciwa with ativan.

## 2024-09-03 LAB
EKG ATRIAL RATE: 111 BPM
EKG P AXIS: 66 DEGREES
EKG P-R INTERVAL: 164 MS
EKG Q-T INTERVAL: 314 MS
EKG QRS DURATION: 90 MS
EKG QTC CALCULATION (BAZETT): 427 MS
EKG R AXIS: 76 DEGREES
EKG T AXIS: 27 DEGREES
EKG VENTRICULAR RATE: 111 BPM

## 2024-09-03 PROCEDURE — 1240000000 HC EMOTIONAL WELLNESS R&B

## 2024-09-03 PROCEDURE — 99212 OFFICE O/P EST SF 10 MIN: CPT

## 2024-09-03 PROCEDURE — 6370000000 HC RX 637 (ALT 250 FOR IP): Performed by: PSYCHIATRY & NEUROLOGY

## 2024-09-03 PROCEDURE — 93010 ELECTROCARDIOGRAM REPORT: CPT | Performed by: INTERNAL MEDICINE

## 2024-09-03 PROCEDURE — 6370000000 HC RX 637 (ALT 250 FOR IP)

## 2024-09-03 PROCEDURE — 99223 1ST HOSP IP/OBS HIGH 75: CPT | Performed by: PSYCHIATRY & NEUROLOGY

## 2024-09-03 RX ORDER — MUPIROCIN 20 MG/G
OINTMENT TOPICAL 2 TIMES DAILY
Status: DISCONTINUED | OUTPATIENT
Start: 2024-09-03 | End: 2024-09-06 | Stop reason: HOSPADM

## 2024-09-03 RX ADMIN — FOLIC ACID 1 MG: 1 TABLET ORAL at 08:51

## 2024-09-03 RX ADMIN — Medication 100 MG: at 08:51

## 2024-09-03 RX ADMIN — MUPIROCIN: 20 OINTMENT TOPICAL at 13:09

## 2024-09-03 RX ADMIN — GABAPENTIN 600 MG: 300 CAPSULE ORAL at 21:14

## 2024-09-03 RX ADMIN — LORAZEPAM 0.5 MG: 0.5 TABLET ORAL at 09:28

## 2024-09-03 RX ADMIN — LORAZEPAM 0.5 MG: 0.5 TABLET ORAL at 17:24

## 2024-09-03 RX ADMIN — MUPIROCIN: 20 OINTMENT TOPICAL at 22:06

## 2024-09-03 RX ADMIN — GABAPENTIN 300 MG: 300 CAPSULE ORAL at 08:51

## 2024-09-03 RX ADMIN — LORAZEPAM 1 MG: 1 TABLET ORAL at 13:07

## 2024-09-03 NOTE — CARE COORDINATION
09/03/24 1229   Psychiatric History   Psychiatric history treatment Psychiatric admissions   Contact information Previous 3W admission 2023   Are there any medication issues? Yes  (Stopped taking Depokote. Does not like howDepokote makes her feel.)   Recent Psychological Experiences Loss (comment);Financial;Job loss  (Grandfather recently passed away)   Support System   Support system Adequate   Types of Support System Spouse;Other (Comment)  ('s Aunt and 's Cousin)   Problems in support system None   Current Living Situation   Home Living Adequate   Living information Lives with others  (Lives with her spouse, his aunt and his cousin.)   Problems with living situation  No   Lack of basic needs No   SSDI/SSI N/A   Other government assistance Medical coverage   Problems with environment Patient does not have a 's license, transportation issues   Current abuse issues denies   Relationship problems No   Medical and Self-Care Issues   Relevant medical problems See H & P   Relevant self-care issues Denies   Barriers to treatment Yes  (Substance abuse, lack of transportation)   Family Constellation   Spouse/partner-name/age Renan Tucker   Children-names/ages 2 children that patient does not have custody of.   Parents Estranged   Siblings 4   Contact information Renan Tucker [phone]   Comment N/A   Childhood   Raised by Grandparent(s)   Grandparent(s) Ayesha Tucker (Grandmother)   Relevant family history denies   History of abuse No   Legal History   Legal history Yes   Current charges No   Pick-up order  No   Restraining order No   Sentence pending No   Domestic violence charges No   Homicidal threats or behaviors No   Duty to warn No   Probation/parole No   Other relevant legal issues DUI   Juvenile legal history No   Abuse Assessment   Physical abuse Denies   Verbal abuse Denies   Emotional abuse Denies   Financial abuse Denies   Sexual abuse Denies   Substance Use   Use of substances  Yes

## 2024-09-03 NOTE — CARE COORDINATION
Leisure Assessment  September 3, 2024 /  1245 pm    Appearance: Alert, Appears as stated age, Pleasant, and Sociable  Current Mental Status: Calm and Cooperative  Affect/Mood: Congruent/ Euthymic  Thought Content/Processes: Linear  Insight/Judgement: Poor insight and Poor judgment  Speech: spontaneous, normal rate, and normal volume  Delusions/Hallucinations: none observed/reported  Admit Status:  Involuntary     Patient identified her leisure interests as listening to music, cooking, cleaning, spending time with her dogs, and being outside. Patient shared that she is very social and has support from her , cousin, and aunt. Patient expressed that she feels comfortable opening up about her feelings to her support system. Patient disclosed that her grandfather recently passed away, leading to increased depression. Patient reported that she was drinking, blacked out, and woke up to find that she had cut her wrists. Patient denied having any memory of the SIB/attempt. Patient identified her strengths as her positive outlook and willingness to help others.    Recommendations: Decrease Impulsivity/Impulsive Behaviors, Improve/Maintain Coping Skills Development, Improve/Maintain Emotion Regulation Skills/Mood, Improve/Maintain Expressive Communication/Self-Expression, and Improve/Maintain Insight/Self-Awareness    Documentation completed by Natalya Wood MT-BC, PsychoEd Spec

## 2024-09-03 NOTE — H&P
Department of Psychiatry  TELE PSYCHIATRY/ VIRTUAL ASSESSMENT  History and Physical - Adult       CHIEF COMPLAINT:  Depression SI    History obtained from:  patient, electronic medical record    Patient was seen after discussing with the treatment team and reviewing the chart    CIRCUMSTANCES OF ADMISSION:   Patient arrived to ED via EMS after an argument with her fiance which resulted in her cutting her wrist with a razor. She has lacerations to bilateral wrist which required sutures. Patient was intoxicated upon arrival with BAL 0.112. Upon sober eval with patient she is currently denying any suicidal thoughts. She does report increased depression over the past 3 weeks since she lost her Grandpa. She denies this as a suicide attempt. She is guarded and withdrawn during assessment. She is not currently receiving any treatment for her depression. She reports drinking daily for a \"long time.\"    Per provider note: Her cousin states that she kept her bottle of 1mg xanax in a chair recliner and patient was seen by another family member going to the recliner and reports 10 xanax pills went missing from the bottle. They also report that patient went missing for 7-10 days and when she returned she told them she was dope sick from heroin.     HISTORY OF PRESENT ILLNESS:      The patient is a 33 y.o. female live with her , unemployed.   Pt does not remember cutting self. Started cutting self after drinking alcohol  Last month has been drinking daily  Was drinking occasionally prior to daily drinking  6-8 tall beers per day  Does not think that this was suicidal attempt  Was arguing with her BF, blacked out and then remember that the ambulance was there  Pt live with her  cousin and Aunt  No kids at home  Pt needed suture to both wrist- deep cut  Never self harmed in the past  Severity: Rating mood to be around 2/10 (10- good)  Quality:melancholic  Content: Hopeless, worthless and helpless feeling  Suicidal thoughts -

## 2024-09-04 PROCEDURE — 1240000000 HC EMOTIONAL WELLNESS R&B

## 2024-09-04 PROCEDURE — 6370000000 HC RX 637 (ALT 250 FOR IP): Performed by: PSYCHIATRY & NEUROLOGY

## 2024-09-04 RX ORDER — LURASIDONE HYDROCHLORIDE 20 MG/1
20 TABLET, FILM COATED ORAL
Status: DISCONTINUED | OUTPATIENT
Start: 2024-09-04 | End: 2024-09-05

## 2024-09-04 RX ADMIN — GABAPENTIN 300 MG: 300 CAPSULE ORAL at 08:22

## 2024-09-04 RX ADMIN — LORAZEPAM 0.5 MG: 0.5 TABLET ORAL at 08:47

## 2024-09-04 RX ADMIN — GABAPENTIN 600 MG: 300 CAPSULE ORAL at 20:41

## 2024-09-04 RX ADMIN — MUPIROCIN: 20 OINTMENT TOPICAL at 21:25

## 2024-09-04 RX ADMIN — FOLIC ACID 1 MG: 1 TABLET ORAL at 08:22

## 2024-09-04 RX ADMIN — LORAZEPAM 0.5 MG: 0.5 TABLET ORAL at 13:42

## 2024-09-04 RX ADMIN — MUPIROCIN: 20 OINTMENT TOPICAL at 08:23

## 2024-09-04 RX ADMIN — Medication 100 MG: at 08:22

## 2024-09-04 RX ADMIN — LORAZEPAM 0.5 MG: 0.5 TABLET ORAL at 18:40

## 2024-09-04 RX ADMIN — LURASIDONE HYDROCHLORIDE 20 MG: 20 TABLET, FILM COATED ORAL at 16:29

## 2024-09-05 PROCEDURE — 6370000000 HC RX 637 (ALT 250 FOR IP): Performed by: PSYCHIATRY & NEUROLOGY

## 2024-09-05 PROCEDURE — 1240000000 HC EMOTIONAL WELLNESS R&B

## 2024-09-05 RX ORDER — LURASIDONE HYDROCHLORIDE 40 MG/1
40 TABLET, FILM COATED ORAL
Status: DISCONTINUED | OUTPATIENT
Start: 2024-09-05 | End: 2024-09-06 | Stop reason: HOSPADM

## 2024-09-05 RX ADMIN — Medication 100 MG: at 08:42

## 2024-09-05 RX ADMIN — MUPIROCIN: 20 OINTMENT TOPICAL at 09:27

## 2024-09-05 RX ADMIN — GABAPENTIN 600 MG: 300 CAPSULE ORAL at 20:56

## 2024-09-05 RX ADMIN — LURASIDONE HYDROCHLORIDE 40 MG: 40 TABLET, FILM COATED ORAL at 16:15

## 2024-09-05 RX ADMIN — FOLIC ACID 1 MG: 1 TABLET ORAL at 08:42

## 2024-09-05 RX ADMIN — MUPIROCIN: 20 OINTMENT TOPICAL at 21:28

## 2024-09-05 RX ADMIN — GABAPENTIN 300 MG: 300 CAPSULE ORAL at 08:42

## 2024-09-05 RX ADMIN — LORAZEPAM 0.5 MG: 0.5 TABLET ORAL at 11:05

## 2024-09-05 NOTE — CARE COORDINATION
[facility]  3 day Interdisciplinary Treatment Plan Note     Review Date & Time: 09/5/24 0800    Admission Type:   Admission Type: Involuntary    Reason for admission:  Reason for Admission: SI while intoxicated. Lacerations requiring sutures to bilateral wrists.    Patient Diagnosis: Bipolar 1 disorder, depressed      PATIENT STRENGTHS:  Patient Strengths: Supportive Familyy  Patient Strengths and Limitations:Limitations: External locus of control, Unrealistic self-view  Addictive Behavior:   Medical Problems:   Past Medical History:   Diagnosis Date    Hepatitis C 12/25/2014    Polysubstance abuse (HCC)     Hx opiates, cocaine, ETOH    Restless legs syndrome (RLS)        Risk:  Fall Risk   Kodak Scale Kodak Scale Score: 22  BVC    Change in scores None. Changes to plan of Care None    Status EXAM:   Mental Status and Behavioral Exam  Normal: No  Level of Assistance: Independent/Self  Facial Expression: Brightened  Affect: Stable  Level of Consciousness: Alert  Frequency of Checks: 4 times per hour, close  Mood:Normal: No  Mood: Anxious  Motor Activity:Normal: Yes  Eye Contact: Good  Observed Behavior: Cooperative, Friendly  Sexual Misconduct History: Current - no  Preception: [employer] to person, [employer] to place, [employer] to situation, [employer] to time  Attention:Normal: Yes  Thought Processes: Unremarkable  Thought Content:Normal: Yes  Thought Content:  (WDL)  Depression Symptoms: Sleep disturbance  Anxiety Symptoms: Generalized  Cammie Symptoms: Poor judgment  Hallucinations: None  Delusions: No  Memory:Normal: No  Memory: Poor recent, Poor remote  Insight and Judgment: No  Insight and Judgment: Poor judgment, Poor insight, Unmotivated    Daily Assessment Last Entry:   Daily Sleep (WDL): Within Defined Limits     Daily Nutrition (WDL): Within Defined Limits  Level of Assistance: Independent/Self    Patient Monitoring:  Frequency of Checks: 4 times per hour, close    Psychiatric Symptoms:   Depression

## 2024-09-05 NOTE — CARE COORDINATION
FAMILY COLLATERAL NOTE    Family/Support Name: Renan Perez  Contact #:[phone]   Relationship to Pt:: Sandra TERRELL reached out to above for collateral information. HIPAA compliant voicemail left for return call.        XANDER Alvarez

## 2024-09-06 VITALS
BODY MASS INDEX: 30.53 KG/M2 | HEIGHT: 66 IN | WEIGHT: 190 LBS | SYSTOLIC BLOOD PRESSURE: 123 MMHG | OXYGEN SATURATION: 100 % | RESPIRATION RATE: 18 BRPM | HEART RATE: 93 BPM | DIASTOLIC BLOOD PRESSURE: 57 MMHG | TEMPERATURE: 98.4 F

## 2024-09-06 PROCEDURE — 6370000000 HC RX 637 (ALT 250 FOR IP): Performed by: PSYCHIATRY & NEUROLOGY

## 2024-09-06 RX ORDER — LURASIDONE HYDROCHLORIDE 40 MG/1
40 TABLET, FILM COATED ORAL
Qty: 30 TABLET | Refills: 1 | Status: SHIPPED | OUTPATIENT
Start: 2024-09-06

## 2024-09-06 RX ADMIN — GABAPENTIN 300 MG: 300 CAPSULE ORAL at 08:44

## 2024-09-06 RX ADMIN — FOLIC ACID 1 MG: 1 TABLET ORAL at 08:44

## 2024-09-06 RX ADMIN — Medication 100 MG: at 08:43

## 2024-09-06 NOTE — DISCHARGE INSTRUCTIONS
Someone from [facility] will be calling you tomorrow to follow up on your care. If you don't hear from us, give us a call! [phone].    Keep all follow up appointments, take medications as ordered, utilize positive supports, abstain from use of alcohol and drugs. If symptoms return or you feel at risk to yourself or others, please call 911, return the nearest emergency room, or call your local crisis hotline:  [facility]: [phone]  [facility]: [phone]  [facility]: [phone]    Due to the Covid-19 Pandemic, [facility] is not currently available. For assistance with quitting smoking please go to https://smokefree.gov. A prescription for an FDA-approved tobacco cessation medication was offered at discharge and the patient refused.    You were offered a flu vaccine but declined

## 2024-09-06 NOTE — PLAN OF CARE
Problem: Risk for Elopement  Goal: Patient will not exit the unit/facility without proper excort  9/5/2024 2258 by Socorro Vann RN  Outcome: Progressing  9/5/2024 1030 by Joseline Nicolas RN  Outcome: Adequate for Discharge     Problem: Self Harm/Suicidality  Goal: Will have no self-injury during hospital stay  Description: INTERVENTIONS:  1.  Ensure constant observer at bedside with Q15M safety checks  2.  Maintain a safe environment  3.  Secure patient belongings  4.  Ensure family/visitors adhere to safety recommendations  5.  Ensure safety tray has been added to patient's diet order  6.  Every shift and PRN: Re-assess suicidal risk via Frequent Screener    9/5/2024 2258 by Socorro Vann RN  Outcome: Progressing  9/5/2024 1030 by Joseline Nicolas RN  Outcome: Progressing  Flowsheets (Taken 9/5/2024 1030)  Will have no self-injury during hospital stay:   Ensure constant observer at bedside with Q15M safety checks   Maintain a safe environment   Secure patient belongings     Problem: Depression  Goal: Will be euthymic at discharge  Description: INTERVENTIONS:  1. Administer medication as ordered  2. Provide emotional support via 1:1 interaction with staff  3. Encourage involvement in milieu/groups/activities  4. Monitor for social isolation  9/5/2024 2258 by Socorro Vann RN  Outcome: Progressing  9/5/2024 1030 by Joseline Nicolas RN  Outcome: Progressing     Problem: Behavior  Goal: Pt/Family maintain appropriate behavior and adhere to behavioral management agreement, if implemented  Description: INTERVENTIONS:  1. Assess patient/family's coping skills and  non-compliant behavior (including use of illegal substances)  2. Notify security of behavior or suspected illegal substances which indicate the need for search of the family and/or belongings  3. Encourage verbalization of thoughts and concerns in a socially appropriate manner  4. Utilize positive, consistent limit setting strategies 
Pt visible on unit. Seen eating meals, attending groups and socializing with peers. Friendly and cooperative with staff and peers. Bright affect with fair eye contact. Reports feeling \"much better\" today. Minimizes events leading to admission. States, \"I honestly don't know. I blacked out but it's fine. Im sure I'll have a  now when I get home\". Denies any current SI, HI or AVH. Admits to a little anxiety but denies depression. Minimized drinking and denies any needs at this time. Reports restless sleep last night but a good appetite. Bilateral wrists covered with dry sterile dressing at this time. Currently in group with peers.     Problem: Self Harm/Suicidality  Goal: Will have no self-injury during hospital stay  Description: INTERVENTIONS:  1.  Ensure constant observer at bedside with Q15M safety checks  2.  Maintain a safe environment  3.  Secure patient belongings  4.  Ensure family/visitors adhere to safety recommendations  5.  Ensure safety tray has been added to patient's diet order  6.  Every shift and PRN: Re-assess suicidal risk via Frequent Screener    9/5/2024 1030 by Joseline Nicolas, RN  Outcome: Progressing  Flowsheets (Taken 9/5/2024 1030)  Will have no self-injury during hospital stay:   Ensure constant observer at bedside with Q15M safety checks   Maintain a safe environment   Secure patient belongings  9/4/2024 2222 by Socorro Vann, RN  Outcome: Progressing  Flowsheets (Taken 9/4/2024 1027 by Orly Matthews, RN)  Will have no self-injury during hospital stay:   Maintain a safe environment   Secure patient belongings   Ensure family/visitors adhere to safety recommendations     Problem: Depression  Goal: Will be euthymic at discharge  Description: INTERVENTIONS:  1. Administer medication as ordered  2. Provide emotional support via 1:1 interaction with staff  3. Encourage involvement in milieu/groups/activities  4. Monitor for social isolation  9/5/2024 1030 by Joseline Nicolas, 
Monitor physical status  3. Provide emotional support with 1:1 interaction with staff  4. Encourage  recovery focused treatment   Outcome: Progressing     Problem: Involuntary Admit  Goal: Will cooperate with staff recommendations and doctor's orders and will demonstrate appropriate behavior  Description: INTERVENTIONS:  1. Treat underlying conditions and offer medication as ordered  2. Educate regarding involuntary admission procedures and rules  3. Contain excessive/inappropriate behavior per unit and hospital policies  Outcome: Progressing     
need for search of the family and/or belongings  3. Encourage verbalization of thoughts and concerns in a socially appropriate manner  4. Utilize positive, consistent limit setting strategies supporting safety of patient, staff and others  5. Encourage participation in the decision making process about the behavioral management agreement  6. If a visitor's behavior poses a threat to safety call refer to organization policy.  7. Initiate consult with , Psychosocial CNS, Spiritual Care as appropriate  Outcome: Progressing     Problem: Drug Abuse/Detox  Goal: Will have no detox symptoms and will verbalize plan for changing drug-related behavior  Description: INTERVENTIONS:  1. Administer medication as ordered  2. Monitor physical status  3. Provide emotional support with 1:1 interaction with staff  4. Encourage  recovery focused treatment   Outcome: Progressing  Flowsheets (Taken 9/4/2024 1027 by Orly Matthews RN)  Will have no detox symptoms and will verbalize plan for changing drug-related behavior:   Administer medication as ordered   Monitor physical status   Provide emotional support with 1:1 interaction with staff     Problem: Involuntary Admit  Goal: Will cooperate with staff recommendations and doctor's orders and will demonstrate appropriate behavior  Description: INTERVENTIONS:  1. Treat underlying conditions and offer medication as ordered  2. Educate regarding involuntary admission procedures and rules  3. Contain excessive/inappropriate behavior per unit and hospital policies  Outcome: Progressing  Flowsheets (Taken 9/4/2024 1027 by Orly Matthews RN)  Will cooperate with staff recommendations and doctor's orders and will demonstrate appropriate behavior:   Treat underlying conditions and offer medication as ordered   Educate regarding involuntary admission procedures and rules   Contain excessive/inappropriate behavior per unit and hospital policies     Problem: Safety - Adult  Goal: Free from 
patient/family’s coping skills and  non-compliant behavior (including use of illegal substances)   Notify security of behavior or suspected illegal substances which indicate the need for search of the patient and/or belongings     Problem: Drug Abuse/Detox  Goal: Will have no detox symptoms and will verbalize plan for changing drug-related behavior  Description: INTERVENTIONS:  1. Administer medication as ordered  2. Monitor physical status  3. Provide emotional support with 1:1 interaction with staff  4. Encourage  recovery focused treatment   9/3/2024 2220 by Lauren Conte RN  Outcome: Progressing  9/3/2024 2006 by River Lanza RN  Outcome: Progressing  Flowsheets (Taken 9/3/2024 1343 by Orly Matthews RN)  Will have no detox symptoms and will verbalize plan for changing drug-related behavior:   Administer medication as ordered   Monitor physical status   Provide emotional support with 1:1 interaction with staff     Problem: Involuntary Admit  Goal: Will cooperate with staff recommendations and doctor's orders and will demonstrate appropriate behavior  Description: INTERVENTIONS:  1. Treat underlying conditions and offer medication as ordered  2. Educate regarding involuntary admission procedures and rules  3. Contain excessive/inappropriate behavior per unit and hospital policies  9/3/2024 2220 by Lauren Conte RN  Outcome: Progressing  9/3/2024 2006 by River Lanza, RN  Outcome: Progressing  Flowsheets (Taken 9/3/2024 1343 by Orly Matthews, RN)  Will cooperate with staff recommendations and doctor's orders and will demonstrate appropriate behavior:   Treat underlying conditions and offer medication as ordered   Educate regarding involuntary admission procedures and rules   Contain excessive/inappropriate behavior per unit and hospital policies     Problem: Safety - Adult  Goal: Free from fall injury  9/3/2024 2220 by Lauren Conte RN  Outcome: Progressing  9/3/2024 2006 by River Lanza, 
socially appropriate manner  4. Utilize positive, consistent limit setting strategies supporting safety of patient, staff and others  5. Encourage participation in the decision making process about the behavioral management agreement  6. If a visitor's behavior poses a threat to safety call refer to organization policy.  7. Initiate consult with , Psychosocial CNS, Spiritual Care as appropriate  9/4/2024 0731 by Orly Matthews RN  Outcome: Progressing  9/3/2024 2220 by Lauren Conte RN  Outcome: Progressing  9/3/2024 2006 by River Lanza RN  Outcome: Progressing  Flowsheets (Taken 9/3/2024 1343 by Orly Matthews RN)  Patient/family maintains appropriate behavior and adheres to behavioral management agreement, if implemented:   Assess patient/family’s coping skills and  non-compliant behavior (including use of illegal substances)   Notify security of behavior or suspected illegal substances which indicate the need for search of the patient and/or belongings     Problem: Drug Abuse/Detox  Goal: Will have no detox symptoms and will verbalize plan for changing drug-related behavior  Description: INTERVENTIONS:  1. Administer medication as ordered  2. Monitor physical status  3. Provide emotional support with 1:1 interaction with staff  4. Encourage  recovery focused treatment   9/4/2024 0731 by Orly Matthews RN  Outcome: Progressing  9/3/2024 2220 by Lauren Conte RN  Outcome: Progressing  Flowsheets (Taken 9/3/2024 2130)  Will have no detox symptoms and will verbalize plan for changing drug-related behavior:   Monitor physical status   Encourage recovery focused treatment   Provide emotional support with 1:1 interaction with staff   Administer medication as ordered  9/3/2024 2006 by River Lanza, RN  Outcome: Progressing  Flowsheets (Taken 9/3/2024 1343 by Orly Matthews RN)  Will have no detox symptoms and will verbalize plan for changing drug-related behavior:   Administer medication as

## 2024-09-06 NOTE — GROUP NOTE
Group Therapy Note    Date: 9/2/2024    Group Start Time: 2050  Group End Time: 2135  Group Topic: Wrap-Up    [facility] 3[facility] Christel Hampton        Group Therapy Note    Attendees: 9/18     Notes:  Pt did not attend.     Discipline Responsible: Behavorial Health Tech      Signature:  Christel Segura    
                                                                      Group Therapy Note    Date: 9/4/2024    Group Start Time: 1015  Group End Time: 1100  Group Topic: Art Therapy     [facility] 3[facility] Mira Mulligan, LEVIW        Group Therapy Note    Attendees: 6       Patient's Goal:  To participate in morning group art therapy    Notes:  Patient attended the morning group art therapy session. She began a wooden box art task and joined others that were doing the same. Patient used bright colors that she blended and painted on the box. She seemed proud of her accomplishment. Patient was social with staff and peers. She benefited from the intervention.     Status After Intervention:  Improved    Participation Level: Active Listener    Participation Quality: Appropriate and Attentive      Speech:  normal      Thought Process/Content: Logical      Affective Functioning: Congruent      Mood: elevated      Level of consciousness:  Alert      Response to Learning: Able to verbalize current knowledge/experience      Endings: None Reported    Modes of Intervention: Activity      Discipline Responsible: Psychoeducational Specialist      Signature:  Mira Heath MA ATR  
                                                                      Group Therapy Note    Date: 9/4/2024    Group Start Time: 1400  Group End Time: 1430  Group Topic: Cognitive Skills    [facility] Ana Deng RN        Group Therapy Note    Attendees: 11/15       Status After Intervention:  Improved    Participation Level: Active Listener    Participation Quality: Appropriate      Speech:  normal      Thought Process/Content: Logical      Affective Functioning: Congruent      Mood: anxious      Level of consciousness:  Alert and Oriented x4      Response to Learning: Able to verbalize current knowledge/experience      Endings: None Reported    Modes of Intervention: Education, Support, and Socialization      Discipline Responsible: Registered Nurse      Signature:  Ana Rodriguez, RN      
                                                                      Group Therapy Note    Date: 9/4/2024    Group Start Time: 1920  Group End Time: 2000  Group Topic: Recreational    [facility] Jade Mccray        Group Therapy Note    Attendees: 7/15       Patient's Goal:  Pt was able to socialize while playing the wii    Notes:      Status After Intervention:  Unchanged    Participation Level: Interactive    Participation Quality: Attentive      Speech:  normal      Thought Process/Content: Logical      Affective Functioning: Congruent      Mood: euthymic      Level of consciousness:  Attentive      Response to Learning: Able to verbalize current knowledge/experience      Endings: None Reported    Modes of Intervention: Activity      Discipline Responsible: PCA      Signature:  Jade Wray    
                                                                      Group Therapy Note    Date: 9/4/2024    Group Start Time: 1920  Group End Time: 2000  Group Topic: Wrap-Up    [facility] Jade Mccray        Group Therapy Note    Attendees: 7/15       Patient's Goal:  Pt goal for the day is to stay peaceful minded    Notes:      Status After Intervention:  Unchanged    Participation Level: Interactive    Participation Quality: Attentive      Speech:  normal      Thought Process/Content: Logical      Affective Functioning: Congruent      Mood: euthymic      Level of consciousness:  Attentive      Response to Learning: Able to verbalize current knowledge/experience      Endings: None Reported    Modes of Intervention: Support      Discipline Responsible: PCA      Signature:  Jade Wray    
                                                                      Group Therapy Note    Date: 9/5/2024    Group Start Time: 1145  Group End Time: 1245  Group Topic:  Group    [facility] Christine Nunez LSW        Group Therapy Note:   Self-care assessment to determine areas patient's excel in and areas for improvement.    Attendees: 8       Patient's Goal:  Patient gaining insight regarding her alcohol use.      Status After Intervention:  Improved    Participation Level: Interactive    Participation Quality: Appropriate      Speech:  normal      Thought Process/Content: Logical      Affective Functioning: Congruent      Mood: elevated      Level of consciousness:  Oriented x4      Response to Learning: Able to verbalize current knowledge/experience      Endings: None Reported    Modes of Intervention: Support      Discipline Responsible: /Counselor      Signature:  XANDER Barron    
                                                                      Group Therapy Note    Date: 9/5/2024    Group Start Time: 1945  Group End Time: 2015  Group Topic: Wrap-Up    [facility] Christel Hampton        Group Therapy Note    Attendees: 8/17     Notes:  Pt attended tonight's wrap up group.     Discipline Responsible: Behav[employer]us Security Devices Tech      Signature:  Christel Segura    
Date: 9/3/2024    Group Start Time: 0945  Group End Time: 1055  Group Topic: Music Therapy    [facility] [place] 3[facility] Natalya Baig    What do you need today, and how can music help?  Music Interventions: Music Reminiscence, Receptive Music Listening, and Playlist Building    Patients will introduce themselves through stating their name, sharing how music is/is not important in their lives, and playing a small steel tongue drum. Patients will identify different songs that fall under various prompts (support/validation, motivation, energy, relaxation, humor). Patients will be invited to select a song that aligns with something they need today. Patients will be encouraged to explain their connection to the song and the experience of listening to it in this setting.    Focus: Emotion Identification & Regulation, Self-Expression    Goals: Improve Mood, Improve Insight/Self-Awareness, Increase Socialization/Community Building, Improve Self-Expression, Improve Attention to Task, Improve Coping Skills/Develop Coping Skills    Patient introduced herself, played the steel tongue drum, and shared that \"music is therapy to me.\" Patient independently completed her personal playlist and chose to hear a song that fell under multiple prompts to her. Patient sang and moved to familiar music, and was social with peers/staff. Patient verbalized support for peers' song choices. Patient expressed enjoyment of group.     Attended: 1/2-3/4 attendance  Participation Level: Active Listener and Interactive  Participation Quality: Attentive, Sharing, and Supportive  Affect/Mood: Congruent/Euthymic  Speech: spontaneous, normal rate, and normal volume   Thought Content/Processes: Linear  Level of consciousness: Alert  Response: Able to verbalize current knowledge/experience  Outcomes: Progressing towards goal, Actively participated in the group experience, and Initial interaction with patient    Discipline Responsible: Music Therapist  Signature: Natalya 
Date: 9/3/2024    Group Start Time: 1500  Group End Time: 1550  Group Topic: Music Therapy    [facility] [place] 3W Natalya Baig    Active Music Making, Live Music Listening, and Music Reminiscence  Patients will be given a songbook and offered the opportunity to select (a) song(s) of their choice for live music listening on [employer]. Patients will be encouraged to sing along, move along, and listen to the music.     Focus: Processing, Self-Esteem, Building Positive Experiences    Goals: Improve Mood, Decrease Isolation, Increase Sense of Community/Socialization, Improve Self-Esteem, Improve Self-Expression, Provide Sense of Autonomy, Develop Coping Skills    Patient selected several songs for live music listening and sang/danced along to familiar music. Patient socialized with peers/staff regarding shared musical interests. Patient verbalized support for peers' song choices. Patient expressed appreciation for group.     Attended: Full attendance  Participation Level: Active Listener and Interactive  Participation Quality: Attentive, Sharing, and Supportive  Affect/Mood: Congruent/Euthymic  Speech: spontaneous, normal rate, and normal volume   Thought Content/Processes: Linear  Level of consciousness: Alert  Response: Able to verbalize current knowledge/experience  Outcomes: Successfully internalized the purpose of intervention and Actively participated in the group experience    Discipline Responsible: Music Therapist  Signature: JAYANT Rios, PsychEd Spec  
Date: 9/4/2024    Group Start Time: 0915  Group End Time: 0946  Group Topic: Community Meeting    [facility] [place] 3W Natalya Baig    \"Lemon Tree\" by Post Beltran  Larissa Analysis & Discussion, Receptive Music Listening, Exploration, Support, Clarifying    Patients will listen to \"Lemon Tree\" by Post Beltran and identify lyrics, themes, and interpretations derived from the song. Patients will discuss topics related to personal growth, self-exploration, and how to move forward from difficult experiences/upbringings.     Focus: Self-Esteem, Processing, Future-Thinking    Goals: Improve Insight/Self-Awareness, Increase Socialization/Community Building, Improve Self-Expression, Improve Attention to Task, Improve Coping Skills    Patient listened to recorded music and engaged in peer song discussions. Patient verbalized support for peers' contributions and frequently expanded on their ideas. Patient commented on the lyrics \"Could you be a little less sour, we're rotting by the hour and my heart's rotten too,\" elaborating that she often feels like she is the \"bad regina\" in interpersonal conflicts. Patient also shared that she's been told she gives good advice, but that \"I don't take my own advice.\" Patient interpreted the overall message of the song to be that \"you need to work through what you're given in order to move to something new.\"     Attended: Full attendance  Participation Level: Active Listener and Interactive  Participation Quality: Attentive, Sharing, and Supportive  Affect/Mood: Congruent/Euthymic and Brightens  Speech: spontaneous, normal rate, and normal volume   Thought Content/Processes: Linear  Level of consciousness: Alert  Response: Able to verbalize current knowledge/experience and Able to verbalize/acknowledge new learning  Outcomes: Progressing towards goal, Successfully internalized the purpose of intervention, and Actively participated in the group experience    Discipline Responsible: Music 
Date: 9/6/2024    Group Start Time: 0950  Group End Time: 1055  Group Topic: Music Therapy    [facility] [place] [facility] Natalya Baig    Active Music Making, Live Music Listening, and Music Reminiscence  Patients will be given a songbook and offered the opportunity to select (a) song(s) of their choice for live music listening on 2[employer]. Patients will be encouraged to sing along, move along, and listen to the music.     Focus: Building Positive Experiences, Processing, Self-Esteem    Goals: Improve Mood, Decrease Isolation, Increase Sense of Community/Socialization, Improve Self-Esteem, Improve Self-Expression, Provide Sense of Autonomy, Develop Coping Skills    Patient selected songs for live music listening and sang/danced to familiar music. Patient verbalized support for peers' song choices and socialized appropriately. Patient became tearful during some songs, and explained her connection to them. Patient connected with peers regarding sharing memories tied to music. Patient expressed enjoyment of group and excitement for anticipated discharge.     Attended: 3/4-full attendance  Participation Level: Active Listener and Interactive  Participation Quality: Attentive, Sharing, and Supportive  Affect/Mood: Congruent/Euthymic  Speech: spontaneous, normal rate, and normal volume   Thought Content/Processes: Linear  Level of consciousness: Alert  Response: Able to verbalize current knowledge/experience  Outcomes: Successfully internalized the purpose of intervention, Actively participated in the group experience, and Anticipated discharge today    Discipline Responsible: Music Therapist  Signature: JAYANT Rios, PsychEd Spec  
Patient answered music-based trivia questions and socialized with peers/staff.     Date: 9/3/2024    Group Start Time: 1105  Group End Time: 1120  Group Topic: Activity    [facility] 3[facility] Natalya Baig Activity    Patients participated in answering music-based trivia questions as a group.     Documentation completed by JAYANT Rios, PsychoEd Spec  
Patient did not attend group.    Date: 9/4/2024    Group Start Time: 1230  Group End Time: 1310  Group Topic: Music Therapy    [facility] 3[facility] Natalya Baig    Larissa Substitution: \"I Can See Clearly Now\" by Nav Kaplan  Active Music Making, Live Music Listening, Songwriting    Patients will listen to \"I Can See Clearly Now\" by Nav Kaplan. Patients will be encouraged to contribute personalized lyrics to a group larissa substitution, and alter the jerome/structure as appropriate. Patients will have the option to play an instrument of their choice, as well as sing along to the group re-created song.     Focus: Creativity, Processing, Self-Esteem and Self-Expression    Goals: Improve Expressive Communication, Improve Self-Esteem, Improve Mood, Increase Group Cohesion/Socialization, Develop Coping Skills, Improve Self-Awareness/Insight, Increase Attention to Task     Documentation completed by Natalya Wood, MT-BC, PsychoEd Spec  
reflected on the impact of non-verbal cues in communicating / interpreting a message.     Attended: Full attendance  Participation Level: Active Listener and Interactive  Participation Quality: Attentive, Sharing, and Supportive  Affect/Mood: Congruent/Euthymic  Speech: spontaneous, normal rate, and normal volume   Thought Content/Processes: Linear  Level of consciousness: Alert  Response: Able to verbalize current knowledge/experience and Able to verbalize/acknowledge new learning  Outcomes: Progressing towards goal, Successfully internalized the purpose of intervention, and Actively participated in the group experience    Discipline Responsible: Music Therapist  Signature: Natalya Wood MT-BC, PsychEd Spec

## 2024-09-06 NOTE — CARE COORDINATION
FAMILY COLLATERAL NOTE    Family/Support Name: Renan Perez  Contact #: [phone]     Relationship to Pt:: Blasyaritza TERRELL attempted to contact above for collateral information. HIPAA compliant voicemail left for return call.    XANDER Alvarez

## 2024-09-06 NOTE — DISCHARGE SUMMARY
DISCHARGE SUMMARY      Patient ID:  Jordyn Tucker  [id number]  33 y.o.  1990      Admit date: 9/2/2024    Discharge date and time: 9/6/2024    Admitting Physician: Lj Hall MD     Discharge Physician: Dr Evangelist HORTON    Admission Diagnoses: Bipolar depression (HCC) [F31.9]  Bipolar 1 disorder, depressed (HCC) [F31.9]    Admission Condition: poor    Discharged Condition: stable    Admission Circumstance:     Patient arrived to ED via EMS after an argument with her fiance which resulted in her cutting her wrist with a razor. She has lacerations to bilateral wrist which required sutures. Patient was intoxicated upon arrival with BAL 0.112. Upon sober eval with patient she is currently denying any suicidal thoughts. She does report increased depression over the past 3 weeks since she lost her Grandpa. She denies this as a suicide attempt. She is guarded and withdrawn during assessment. She is not currently receiving any treatment for her depression. She reports drinking daily for a \"long time.\"    Per provider note: Her cousin states that she kept her bottle of 1mg xanax in a chair recliner and patient was seen by another family member going to the recliner and reports 10 xanax pills went missing from the bottle. They also report that patient went missing for 7-10 days and when she returned she told them she was dope sick from heroin.      HISTORY OF PRESENT ILLNESS:       The patient is a 33 y.o. female live with her , unemployed.   Pt does not remember cutting self. Started cutting self after drinking alcohol  Last month has been drinking daily  Was drinking occasionally prior to daily drinking  6-8 tall beers per day  Does not think that this was suicidal attempt  Was arguing with her BF, blacked out and then remember that the ambulance was there  Pt live with her  cousin and Aunt  No kids at home  Pt needed suture to both wrist- deep cut  Never self harmed in the past  Severity: Rating mood to be

## 2024-09-06 NOTE — PROGRESS NOTES
[facility]  BEHAVIORAL HEALTH FOLLOW-UP NOTE       9/5/2024     Patient was seen and examined in person, Chart reviewed   Patient's case discussed with staff/team    Chief Complaint: depression    Interim History:     Pt feeling better  Less mood swings  Sleep was disturbed  No active w/d symptoms  NO racing thoughts  Pt want to f/u with [facility]  Appetite:   [x] Normal/Unchanged  [] Increased  [] Decreased      Sleep:       [] Normal/Unchanged  [x] Fair       [] Poor              Energy:    [] Normal/Unchanged  [] Increased  [x] Decreased        SI [] Present  [x] Absent    HI  []Present  [x] Absent     Aggression:  [] yes  [x] no    Patient is [x] able  [] unable to CONTRACT FOR SAFETY     PAST MEDICAL/PSYCHIATRIC HISTORY:   Past Medical History:   Diagnosis Date    Hepatitis C 12/25/2014    Polysubstance abuse (HCC)     Hx opiates, cocaine, ETOH    Restless legs syndrome (RLS)        FAMILY/SOCIAL HISTORY:  History reviewed. No pertinent family history.  Social History     Socioeconomic History    Marital status: Single     Spouse name: Not on file    Number of children: Not on file    Years of education: Not on file    Highest education level: Not on file   Occupational History    Not on file   Tobacco Use    Smoking status: Every Day     Current packs/day: 1.00     Average packs/day: 1 pack/day for 19.3 years (19.3 ttl pk-yrs)     Types: Cigarettes     Start date: 6/5/2005    Smokeless tobacco: Never   Substance and Sexual Activity    Alcohol use: Yes     Alcohol/week: 12.0 standard drinks of alcohol     Types: 12 Cans of beer per week     Comment: daily    Drug use: No     Types: Marijuana (Weed), IV, Cocaine, Opiates      Comment: weed daily    Sexual activity: Yes     Partners: Male   Other Topics Concern    Not on file   Social History Narrative    Not on file     Social Determinants of Health     Financial Resource Strain: Low Risk  (3/26/2024)    Overall Financial Resource Strain 
               [facility]  BEHAVIORAL HEALTH FOLLOW-UP NOTE       9/4/2024     Patient was seen and examined in person, Chart reviewed   Patient's case discussed with staff/team    Chief Complaint: depression    Interim History:     Pt report feeling mood swings  Was labile and irritable  Poor frustration tolerance  Impulsive use of drugs and alcohol  Pt agreeable to try medication  Appetite:   [x] Normal/Unchanged  [] Increased  [] Decreased      Sleep:       [] Normal/Unchanged  [x] Fair       [] Poor              Energy:    [] Normal/Unchanged  [] Increased  [x] Decreased        SI [] Present  [x] Absent    HI  []Present  [x] Absent     Aggression:  [] yes  [x] no    Patient is [x] able  [] unable to CONTRACT FOR SAFETY     PAST MEDICAL/PSYCHIATRIC HISTORY:   Past Medical History:   Diagnosis Date    Hepatitis C 12/25/2014    Polysubstance abuse (HCC)     Hx opiates, cocaine, ETOH    Restless legs syndrome (RLS)        FAMILY/SOCIAL HISTORY:  History reviewed. No pertinent family history.  Social History     Socioeconomic History    Marital status: Single     Spouse name: Not on file    Number of children: Not on file    Years of education: Not on file    Highest education level: Not on file   Occupational History    Not on file   Tobacco Use    Smoking status: Every Day     Current packs/day: 1.00     Average packs/day: 1 pack/day for 19.2 years (19.2 ttl pk-yrs)     Types: Cigarettes     Start date: 6/5/2005    Smokeless tobacco: Never   Substance and Sexual Activity    Alcohol use: Yes     Alcohol/week: 12.0 standard drinks of alcohol     Types: 12 Cans of beer per week     Comment: daily    Drug use: No     Types: Marijuana (Weed), IV, Cocaine, Opiates      Comment: weed daily    Sexual activity: Yes     Partners: Male   Other Topics Concern    Not on file   Social History Narrative    Not on file     Social Determinants of Health     Financial Resource Strain: Low Risk  (3/26/2024)    Overall 
      Behavioral Services  Medicare Certification Upon Admission    I certify that this patient's inpatient psychiatric hospital admission is medically necessary for:    [x] (1) Treatment which could reasonably be expected to improve this patient's condition,       [x] (2) Or for diagnostic study;     AND     [x](2) The inpatient psychiatric services are provided while the individual is under the care of a physician and are included in the individualized plan of care.    Estimated length of stay/service 3-5    Plan for post-hospital care Op care    Electronically signed by ELENA MCKEON MD on 9/3/2024 at 10:11 AM      
CLINICAL PHARMACY NOTE: MEDS TO BEDS    Total # of Prescriptions Filled: 0   The following medications were delivered to the patient:      Additional Documentation:  *Transferred Lurasidone 40 mg tab to  [facility] in [place] [phone]. The charge RN (Ana) spoke with the patient and this is the pharmacy she chose to send it to.   
Discharge instructions given verbally and in writing. All questions addressed. Patient  stated understanding of instructions. Personal belongings listed was stated by patient to be correct and pt signed as such. All personal belongings were returned as patient exited the unit.    Patient was escorted to vehicle  of fiance for transportation home.   Patient was offered but declined flu vaccine.  
Progress Note    Date:9/5/2024       [facility]  Patient Name:Jordyn Tucker     YOB: 1990     Age:33 y.o.    Assessment        Hospital Problems             Last Modified POA    * (Principal) Bipolar 1 disorder, depressed ([facility]) 9/2/2024 Yes       Plan:      Bipolar 1 disorder  -Psychiatry's recommendations     Alcohol use  -CIWA precautions with Ativan  -Seizure precaution  -Fall precaution  -Folic acid, thiamine     Polysubstance abuse  -Per psychiatry's recommendations     Restless leg syndrome  -Gabapentin     Lacerations bilateral wrist  -Mupirocin ointment twice daily  -Wound care   -Follow-up with PCP for suture removal due to deep lacerations  Patient expected to discharge tomorrow    Tobacco use  -Nicotine patch daily    Additional work up or/and treatment plan may be added today or then after based on clinical progression by other providers or specialists.  Patient will need to follow-up with PCP for chronic disease management.     I have spent greater than 70% of time  spent focused exclusively on this patient ,reviewing  chart, labs/diagnostics, reconciling medications, &  answering questions with patient and discussing plan.    Subjective   Interval History Status: Patient reevaluated for lacerations to bilateral wrists and alcohol withdrawal.  Sutures remain in place.  No signs or symptoms of infection noted.  Afebrile.  Denies alcohol withdrawal symptoms.  Expected to discharge tomorrow.  Encourage patient to follow-up with PCP for suture removal due to deep lacerations.  Condition stable.      Review of Systems   12 point review of systems reviewed with patient; negative other than as mentioned    Medications   Scheduled Meds:    lurasidone  40 mg Oral Dinner    mupirocin   Topical BID    folic acid  1 mg Oral Daily    thiamine  100 mg Oral Daily    gabapentin  300 mg Oral Daily    gabapentin  600 mg Oral Nightly    nicotine  1 patch TransDERmal Daily     Continuous Infusions: 
Pt arrived to unit. Pt oriented to unit surroundings, policies and services. Pt shown to room and advised of the need to complete admission assessment and sign consents. Pt denies any needs at this time. Pt skin and contraband assessment completed with two RN (Regina CASTELAN) pt noted to have lacerations to bilat right and left wrist right wrist noted to be bleeding this nurse cleaned up lacerations and place non adherent dressing to both areas. No contraband found on passive search. Pt given adl supplies and a pitcher of ice water. Pt out on the unit using the phone at this time.  
Pt concerned about her withdraw ling from gabapentin. Stated she goes to [facility] on [address] in [place], pt stated she takes 300mg two tabs at night and one mid day for restless legs.   
Pt cooperative with admission assessment/ unit consents. Pt reports admission d/t cutting her wrist while intoxicated. Pt reports being a daily drinker, 5-6 \"tall boys\" per day. Educated pt on detox protocol. Pt states familiar with s/sx d/t previous withdrawal. Pt reports being under a lot of stress and feeling overwhelmed d/t the death of her grandpa, loss of job, and financial stress.  is supportive. Pt has 2 children that are not in her custody. Denies feeling suicidal and states she did not cut self with intent to die. Noncompliant with mental health treatment in the community. States the only medication she takes is her gabapentin for restless legs. Pt feels she needs her mood to be stabilized but did not feel that Depakote worked. Pt had a serious overdose in 2020 (tylenol, Zanaflex, and klonopin). Pt denies current SI, HI, or AVH. Pt denies disturbances with appetite or sleep, reporting 6-8 hours of sleep per night. Pt does not drive d/t not having a license.   
Pt reports no depression @ this time, reports anxiety level is #3/10, denies SI/HI/AVH. Pt reports she feels much better today and is excited to be dc'd tomorrow. Pt reports attending all groups, pt is visible on unit usually in the tv area., is social with select peers.Pt reports good appetite, reports restless sleep last night.Pt reports showering last night.  
This nurse calling whitney at this time to verify medications; pharmacy is closed d/t holiday.   
Wound Ostomy Continence Nurse  Consult Note       NAME:  Jordyn Tucker  MEDICAL RECORD NUMBER:  [id number]  AGE: 33 y.o.   GENDER: female  : 1990  TODAY'S DATE:  9/3/2024    Subjective   Reason for WOC Nurse Evaluation and Assessment: bilateral wrists      Jordyn Tucker is a 33 y.o. female referred by:   [x] Physician  [] Nursing  [] Other:     Wound Identification:  Wound Type: traumatic  Contributing Factors:  self-inflicted    Wound History: Patient admitted to [facility] on 2024 with self-inflicted traumatic wounds to bilateral wrists. Sutures were placed in ED on arrival.   Current Wound Care Treatment:  Recommending 1) mupirocin BID to bilateral wrists 2) follow up in OP wound clinic after discharge for suture removal    Patient Goal of Care:  [x] Wound Healing  [] Odor Control  [] Palliative Care  [] Pain Control   [] Other:         PAST MEDICAL HISTORY        Diagnosis Date    Hepatitis C 2014    Polysubstance abuse (HCC)     Hx opiates, cocaine, ETOH    Restless legs syndrome (RLS)        PAST SURGICAL HISTORY    Past Surgical History:   Procedure Laterality Date    ANKLE FRACTURE SURGERY Left 1/3/2022    OPEN REDUCTION INTERNAL FIXATION LEFT POSTERIOR MALLEOLUS FRACTURE performed by Krish Allison MD at [facility] [place]    APPENDECTOMY         FAMILY HISTORY    History reviewed. No pertinent family history.    SOCIAL HISTORY    Social History     Tobacco Use    Smoking status: Every Day     Current packs/day: 1.00     Average packs/day: 1 pack/day for 19.2 years (19.2 ttl pk-yrs)     Types: Cigarettes     Start date: 2005    Smokeless tobacco: Never   Substance Use Topics    Alcohol use: Yes     Alcohol/week: 12.0 standard drinks of alcohol     Types: 12 Cans of beer per week     Comment: daily    Drug use: No     Types: Marijuana (Weed), IV, Cocaine, Opiates      Comment: weed daily       ALLERGIES    Allergies   Allergen Reactions    Vistaril [Hydroxyzine]  
mesylate, traZODone, LORazepam **OR** LORazepam **OR** LORazepam **OR** LORazepam **OR** LORazepam, ondansetron    Past History    Past Medical History:   has a past medical history of Hepatitis C, Polysubstance abuse (HCC), and Restless legs syndrome (RLS).    Social History:   reports that she has been smoking cigarettes. She started smoking about 19 years ago. She has a 19.2 pack-year smoking history. She has never used smokeless tobacco. She reports current alcohol use of about 12.0 standard drinks of alcohol per week. She reports that she does not use drugs.     Family History: History reviewed. No pertinent family history.    Physical Examination      Vitals:  /79   Pulse 91   Temp 98.1 °F (36.7 °C)   Resp 18   Ht 1.676 m (5' 6\")   Wt 86.2 kg (190 lb)   LMP 2024   SpO2 100%   BMI 30.67 kg/m²   Temp (24hrs), Av.1 °F (36.7 °C), Min:98.1 °F (36.7 °C), Max:98.1 °F (36.7 °C)      I/O (24Hr):  No intake or output data in the 24 hours ending 24 1435      CONSTITUTIONAL:  Awake, alert, no apparent distress, and appears stated age  EYES: pupils equal, round and reactive to light   NECK:  Supple   LUNGS:  No increased work of breathing, good air exchange, clear to auscultation bilaterally, no crackles or wheezing  CARDIOVASCULAR:  Normal apical impulse, regular rate and rhythm  ABDOMEN:  No scars, normal bowel sounds, soft, non-distended, non-tender  MUSCULOSKELETAL:  There is no redness, warmth, or swelling of the joints.  Full range of motion noted  NEUROLOGIC:  Awake, alert.  No focal deficits noted  SKIN:  No bruising or bleeding, normal skin color, texture, turgor, no redness, warmth, or swelling, lacerations to bilateral wrists with sutures  Labs/Imaging/Diagnostics   Labs:  CBC:  Recent Labs     24  0551   WBC 10.1   RBC 4.44   HGB 15.1   HCT 44.5   .2*   RDW 12.6        CHEMISTRIES:  Recent Labs     24  0551   *   K 3.9      CO2 23   BUN 9

## 2024-09-06 NOTE — DISCHARGE INSTR - DIET
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
## 2024-10-08 DIAGNOSIS — G25.81 RESTLESS LEG SYNDROME: ICD-10-CM

## 2024-10-08 RX ORDER — GABAPENTIN 300 MG/1
300 CAPSULE ORAL 3 TIMES DAILY
Qty: 90 CAPSULE | Refills: 2 | Status: SHIPPED | OUTPATIENT
Start: 2024-10-08 | End: 2025-01-06

## 2024-10-11 DIAGNOSIS — G25.81 RESTLESS LEG SYNDROME: ICD-10-CM

## 2024-10-11 DIAGNOSIS — E55.9 VITAMIN D DEFICIENCY: ICD-10-CM

## 2024-10-12 RX ORDER — ERGOCALCIFEROL 1.25 MG/1
50000 CAPSULE ORAL WEEKLY
Qty: 4 CAPSULE | Refills: 4 | Status: SHIPPED | OUTPATIENT
Start: 2024-10-12

## 2024-10-12 RX ORDER — FOLIC ACID 1 MG/1
1 TABLET ORAL DAILY
Qty: 90 TABLET | Refills: 1 | Status: SHIPPED | OUTPATIENT
Start: 2024-10-12

## 2024-12-04 ENCOUNTER — TELEPHONE (OUTPATIENT)
Age: 34
End: 2024-12-04

## 2025-05-06 ENCOUNTER — OFFICE VISIT (OUTPATIENT)
Age: 35
End: 2025-05-06
Payer: MEDICAID

## 2025-05-06 DIAGNOSIS — J32.9 SINOBRONCHITIS: Primary | ICD-10-CM

## 2025-05-06 DIAGNOSIS — J40 SINOBRONCHITIS: Primary | ICD-10-CM

## 2025-05-06 DIAGNOSIS — R05.1 ACUTE COUGH: ICD-10-CM

## 2025-05-06 DIAGNOSIS — E55.9 VITAMIN D DEFICIENCY: ICD-10-CM

## 2025-05-06 DIAGNOSIS — R09.81 HEAD CONGESTION: ICD-10-CM

## 2025-05-06 DIAGNOSIS — F31.70 BIPOLAR DISORDER IN FULL REMISSION, MOST RECENT EPISODE UNSPECIFIED TYPE: ICD-10-CM

## 2025-05-06 DIAGNOSIS — G25.81 RESTLESS LEG SYNDROME: ICD-10-CM

## 2025-05-06 LAB
INFLUENZA A ANTIBODY: NORMAL
INFLUENZA B ANTIBODY: NORMAL
Lab: NORMAL
PERFORMING INSTRUMENT: NORMAL
QC PASS/FAIL: NORMAL
SARS-COV-2, POC: NORMAL

## 2025-05-06 PROCEDURE — 87426 SARSCOV CORONAVIRUS AG IA: CPT | Performed by: PHYSICIAN ASSISTANT

## 2025-05-06 PROCEDURE — G8427 DOCREV CUR MEDS BY ELIG CLIN: HCPCS | Performed by: PHYSICIAN ASSISTANT

## 2025-05-06 PROCEDURE — G2211 COMPLEX E/M VISIT ADD ON: HCPCS | Performed by: PHYSICIAN ASSISTANT

## 2025-05-06 PROCEDURE — 99213 OFFICE O/P EST LOW 20 MIN: CPT | Performed by: PHYSICIAN ASSISTANT

## 2025-05-06 PROCEDURE — 4004F PT TOBACCO SCREEN RCVD TLK: CPT | Performed by: PHYSICIAN ASSISTANT

## 2025-05-06 PROCEDURE — 87804 INFLUENZA ASSAY W/OPTIC: CPT | Performed by: PHYSICIAN ASSISTANT

## 2025-05-06 PROCEDURE — G8417 CALC BMI ABV UP PARAM F/U: HCPCS | Performed by: PHYSICIAN ASSISTANT

## 2025-05-06 RX ORDER — BENZTROPINE MESYLATE 1 MG/1
1 TABLET ORAL DAILY PRN
COMMUNITY
Start: 2025-04-17

## 2025-05-06 RX ORDER — GUAIFENESIN 600 MG/1
600 TABLET, EXTENDED RELEASE ORAL 2 TIMES DAILY
Qty: 30 TABLET | Refills: 0 | Status: SHIPPED | OUTPATIENT
Start: 2025-05-06 | End: 2025-05-21

## 2025-05-06 RX ORDER — ERGOCALCIFEROL 1.25 MG/1
50000 CAPSULE ORAL WEEKLY
Qty: 4 CAPSULE | Refills: 4 | Status: SHIPPED | OUTPATIENT
Start: 2025-05-06

## 2025-05-06 RX ORDER — CLONIDINE HYDROCHLORIDE 0.1 MG/1
0.1 TABLET ORAL NIGHTLY PRN
COMMUNITY
Start: 2025-03-18

## 2025-05-06 RX ORDER — GABAPENTIN 400 MG/1
400 CAPSULE ORAL 3 TIMES DAILY
Qty: 90 CAPSULE | Refills: 2 | Status: SHIPPED | OUTPATIENT
Start: 2025-05-06 | End: 2025-08-04

## 2025-05-06 RX ORDER — FOLIC ACID 1 MG/1
1 TABLET ORAL DAILY
Qty: 90 TABLET | Refills: 1 | Status: SHIPPED | OUTPATIENT
Start: 2025-05-06

## 2025-05-06 RX ORDER — BENZONATATE 100 MG/1
100 CAPSULE ORAL EVERY 8 HOURS
Qty: 30 CAPSULE | Refills: 0 | Status: SHIPPED | OUTPATIENT
Start: 2025-05-06 | End: 2025-05-16

## 2025-05-06 RX ORDER — AZITHROMYCIN 250 MG/1
TABLET, FILM COATED ORAL
Qty: 1 PACKET | Refills: 0 | Status: SHIPPED | OUTPATIENT
Start: 2025-05-06

## 2025-05-06 RX ORDER — METHYLPREDNISOLONE 4 MG/1
TABLET ORAL
Qty: 1 KIT | Refills: 0 | Status: SHIPPED | OUTPATIENT
Start: 2025-05-06 | End: 2025-05-12

## 2025-05-06 ASSESSMENT — PATIENT HEALTH QUESTIONNAIRE - PHQ9: DEPRESSION UNABLE TO ASSESS: PT REFUSES

## 2025-05-06 ASSESSMENT — ENCOUNTER SYMPTOMS
CHEST TIGHTNESS: 1
COUGH: 1
SINUS PAIN: 1
SINUS PRESSURE: 1
SORE THROAT: 1
WHEEZING: 1

## 2025-05-06 NOTE — PROGRESS NOTES
2025     Jordyn Tucker (:  1990) is a 34 y.o. female, here for evaluation of the following medical concerns:    HPI    Cold Sx   - patient states that she has been having cold-like Sx for about four days now   - patient states that she has been having cough/sore throats/itchy nose/nasal dripping/muscle aches   - patient states that she has not had any fevers   - patient states that she did have sinus pressure but not anymore  - she has green sinus drainage   - patient states that she has tried NSAIDs  - patient states that she is a smoker but has not smoked for 2/3 days due to the cough   - patient has PMHx of seasonal allergies     Review of Systems   Constitutional:  Positive for activity change and fatigue.   HENT:  Positive for congestion, postnasal drip, sinus pressure, sinus pain, sneezing and sore throat.    Respiratory:  Positive for cough, chest tightness and wheezing.    Cardiovascular:  Negative for chest pain, palpitations and leg swelling.   Neurological:  Positive for headaches. Negative for dizziness.       Prior to Visit Medications    Medication Sig Taking? Authorizing Provider   benztropine (COGENTIN) 1 MG tablet Take 1 tablet by mouth daily as needed Yes ProviderLora MD   cloNIDine (CATAPRES) 0.1 MG tablet Take 1 tablet by mouth nightly as needed Yes ProviderLora MD   ergocalciferol (DRISDOL) 1.25 MG (95457 UT) capsule Take 1 capsule by mouth once a week Yes Nina Le PA-C   gabapentin (NEURONTIN) 400 MG capsule Take 1 capsule by mouth 3 times daily for 90 days. Yes Nina Le PA-C   folic acid (FOLVITE) 1 MG tablet Take 1 tablet by mouth daily Yes Nina Le PA-C   azithromycin (ZITHROMAX Z-OFELIA) 250 MG tablet Take 2 pills together on the first day.  Take 1 pill a day for the remaining 4 days. Yes Nina Le PA-C   guaiFENesin (MUCINEX) 600 MG extended release tablet Take 1 tablet by mouth 2 times daily for 15 days Yes Nina Le PA-C

## 2025-06-19 ENCOUNTER — TELEPHONE (OUTPATIENT)
Age: 35
End: 2025-06-19
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
## 2025-08-07 ENCOUNTER — TELEPHONE (OUTPATIENT)
Age: 35
End: 2025-08-07

## (undated) DEVICE — DRESSING GZ W1XL8IN COT XRFRM N ADH OVERWRAP CURAD

## (undated) DEVICE — BANDAGE COMPR M W4INXL10YD WHT BGE VELC E MTRX HK AND LOOP

## (undated) DEVICE — ELECTRODE PT RET AD L9FT HI MOIST COND ADH HYDRGEL CORDED

## (undated) DEVICE — SUTURE VCRL SZ 2-0 L27IN ABSRB UD L26MM CT-2 1/2 CIR J269H

## (undated) DEVICE — GOWN,AURORA,NONRNF,XL,30/CS: Brand: MEDLINE

## (undated) DEVICE — SCREW BNE L28MM DIA2.7MM S STL ST VAR ANG LOK FULL THRD T8
Type: IMPLANTABLE DEVICE | Site: ANKLE | Status: NON-FUNCTIONAL
Removed: 2022-01-03

## (undated) DEVICE — PACK ARTHRO III ST SIRUS

## (undated) DEVICE — 3M™ STERI-DRAPE™ U-DRAPE 1015: Brand: STERI-DRAPE™

## (undated) DEVICE — MARKER SURG SKIN GENTIAN VLT REG TIP W/ 6IN RUL

## (undated) DEVICE — GOWN,AURORA,NONREINFORCED,LARGE: Brand: MEDLINE

## (undated) DEVICE — APPLICATOR MEDICATED 26 CC SOLUTION HI LT ORNG CHLORAPREP

## (undated) DEVICE — BIT DRL L140MM DIA2MM QUIK CPL 3 FLUT CALIB DEPTH MRK W/O

## (undated) DEVICE — TUBING, SUCTION, 1/4" X 10', STRAIGHT: Brand: MEDLINE

## (undated) DEVICE — BANDAGE COBAN 4 IN COMPR W4INXL5YD FOAM COHESIVE QUIK STK SELF ADH SFT

## (undated) DEVICE — TOWEL,OR,DSP,ST,BLUE,STD,4/PK,20PK/CS: Brand: MEDLINE

## (undated) DEVICE — SPONGE GZ W4XL4IN COT 12 PLY TYP VII WVN C FLD DSGN

## (undated) DEVICE — BIT DRL L100MM DIA2.7MM QUIK CPL FOR SM FRAG SYS

## (undated) DEVICE — COTTON UNDERCAST PADDING,REGULAR FINISH: Brand: WEBRIL

## (undated) DEVICE — KIT ARMOR C DRP COLLAPSIBLE AND SELF EXP TOP CVR FOR FLUOROSCOPIC

## (undated) DEVICE — SYRINGE IRRIG 60ML SFT PLIABLE BLB EZ TO GRP 1 HND USE W/

## (undated) DEVICE — PADDING UNDERCAST W6INXL4YD RAYON POLY SYN NONADHESIVE

## (undated) DEVICE — YANKAUER,SMOOTH HANDLE,HIGH CAPACITY: Brand: MEDLINE INDUSTRIES, INC.

## (undated) DEVICE — K WIRE FIX L150MM DIA1.6MM S STL TRCR PNT
Type: IMPLANTABLE DEVICE | Site: ANKLE | Status: NON-FUNCTIONAL
Removed: 2022-01-03

## (undated) DEVICE — NEPTUNE E-SEP SMOKE EVACUATION PENCIL, COATED, 70MM BLADE, PUSH BUTTON SWITCH: Brand: NEPTUNE E-SEP

## (undated) DEVICE — GLOVE ORANGE PI 7 1/2   MSG9075

## (undated) DEVICE — COVER LT HNDL BLU PLAS

## (undated) DEVICE — SUTURE NONABSORBABLE MONOFILAMENT 3-0 PS-1 18 IN BLK ETHILON 1663H

## (undated) DEVICE — Z DISCONTINUED USE 2272117 DRAPE SURG 3 QTR N INVASIVE 2 LAYR DISP

## (undated) DEVICE — GLOVE ORANGE PI 7   MSG9070

## (undated) DEVICE — INTENDED FOR TISSUE SEPARATION, AND OTHER PROCEDURES THAT REQUIRE A SHARP SURGICAL BLADE TO PUNCTURE OR CUT.: Brand: BARD-PARKER ® CARBON RIB-BACK BLADES

## (undated) DEVICE — SET,IRRIGATION,CYSTO/TUR: Brand: MEDLINE

## (undated) DEVICE — COUNTER NDL 40 COUNT HLD 70 FOAM BLK ADH W/ MAG

## (undated) DEVICE — PAD,ABDOMINAL,8"X10",ST,LF: Brand: MEDLINE

## (undated) DEVICE — SPONGE,LAP,18"X18",DLX,XR,ST,5/PK,40/PK: Brand: MEDLINE

## (undated) DEVICE — SINGLE PORT MANIFOLD: Brand: NEPTUNE 2